# Patient Record
Sex: FEMALE | Race: WHITE | NOT HISPANIC OR LATINO | Employment: OTHER | URBAN - METROPOLITAN AREA
[De-identification: names, ages, dates, MRNs, and addresses within clinical notes are randomized per-mention and may not be internally consistent; named-entity substitution may affect disease eponyms.]

---

## 2023-02-05 PROBLEM — I49.9 IRREGULAR HEART BEAT: Status: ACTIVE | Noted: 2023-02-05

## 2023-02-05 PROBLEM — N18.31 STAGE 3A CHRONIC KIDNEY DISEASE (MULTI): Status: ACTIVE | Noted: 2023-02-05

## 2023-02-05 PROBLEM — F32.2 DEPRESSION, MAJOR, SINGLE EPISODE, SEVERE (MULTI): Status: ACTIVE | Noted: 2023-02-05

## 2023-02-05 PROBLEM — F43.21 GRIEF: Status: ACTIVE | Noted: 2023-02-05

## 2023-02-05 PROBLEM — R00.2 PALPITATIONS: Status: ACTIVE | Noted: 2023-02-05

## 2023-02-05 PROBLEM — M19.90 ARTHRITIS: Status: ACTIVE | Noted: 2023-02-05

## 2023-02-05 PROBLEM — R94.31 ABNORMAL EKG: Status: ACTIVE | Noted: 2023-02-05

## 2023-02-05 PROBLEM — I11.9 HYPERTENSIVE HEART DISEASE: Status: ACTIVE | Noted: 2023-02-05

## 2023-02-05 PROBLEM — M79.89 LEG SWELLING: Status: ACTIVE | Noted: 2023-02-05

## 2023-02-05 PROBLEM — R49.0 HOARSENESS: Status: ACTIVE | Noted: 2023-02-05

## 2023-02-05 PROBLEM — E55.9 VITAMIN D DEFICIENCY: Status: ACTIVE | Noted: 2023-02-05

## 2023-02-05 PROBLEM — I47.29 NON-SUSTAINED VENTRICULAR TACHYCARDIA (MULTI): Status: ACTIVE | Noted: 2023-02-05

## 2023-02-05 PROBLEM — D37.8 INTRADUCTAL PAPILLARY MUCINOUS TUMOR OF UNCERTAIN BEHAVIOR OF PANCREAS: Status: ACTIVE | Noted: 2023-02-05

## 2023-02-05 PROBLEM — E78.5 HYPERLIPIDEMIA: Status: ACTIVE | Noted: 2023-02-05

## 2023-02-05 PROBLEM — E03.9 HYPOTHYROIDISM: Status: ACTIVE | Noted: 2023-02-05

## 2023-02-05 PROBLEM — E11.9 DIABETES MELLITUS (MULTI): Status: ACTIVE | Noted: 2023-02-05

## 2023-02-05 PROBLEM — R74.8 ELEVATED LIVER ENZYMES: Status: ACTIVE | Noted: 2023-02-05

## 2023-02-05 PROBLEM — E04.1 THYROID NODULE: Status: ACTIVE | Noted: 2023-02-05

## 2023-02-05 PROBLEM — D64.9 ANEMIA: Status: ACTIVE | Noted: 2023-02-05

## 2023-02-05 PROBLEM — R63.6 MILDLY UNDERWEIGHT ADULT: Status: ACTIVE | Noted: 2023-02-05

## 2023-02-05 PROBLEM — E53.8 VITAMIN B12 DEFICIENCY: Status: ACTIVE | Noted: 2023-02-05

## 2023-02-05 PROBLEM — C82.90 FOLLICULAR LYMPHOMA (MULTI): Status: ACTIVE | Noted: 2023-02-05

## 2023-02-05 PROBLEM — I47.10 SUPRAVENTRICULAR TACHYCARDIA (CMS-HCC): Status: ACTIVE | Noted: 2023-02-05

## 2023-02-05 PROBLEM — I10 HYPERTENSION: Status: ACTIVE | Noted: 2023-02-05

## 2023-02-05 PROBLEM — R79.89 ELEVATED LFTS: Status: ACTIVE | Noted: 2023-02-05

## 2023-02-05 PROBLEM — D49.0 IPMN (INTRADUCTAL PAPILLARY MUCINOUS NEOPLASM): Status: ACTIVE | Noted: 2023-02-05

## 2023-02-05 RX ORDER — TALC
1 POWDER (GRAM) TOPICAL 2 TIMES DAILY
COMMUNITY
Start: 2021-03-22

## 2023-02-05 RX ORDER — SULFASALAZINE 500 MG/1
2 TABLET ORAL 2 TIMES DAILY
COMMUNITY
Start: 2022-09-15

## 2023-02-05 RX ORDER — HYDROXYCHLOROQUINE SULFATE 200 MG/1
1 TABLET, FILM COATED ORAL DAILY
COMMUNITY
End: 2023-03-27

## 2023-02-05 RX ORDER — ZINC GLUCONATE 50 MG
1 TABLET ORAL DAILY
COMMUNITY
Start: 2021-05-04

## 2023-02-05 RX ORDER — CYANOCOBALAMIN 1000 UG/ML
1000 INJECTION, SOLUTION INTRAMUSCULAR; SUBCUTANEOUS
COMMUNITY
End: 2023-11-21 | Stop reason: SDUPTHER

## 2023-02-05 RX ORDER — BLOOD SUGAR DIAGNOSTIC
STRIP MISCELLANEOUS
COMMUNITY
Start: 2019-10-17

## 2023-02-05 RX ORDER — ZOLEDRONIC ACID 5 MG/100ML
INJECTION, SOLUTION INTRAVENOUS
COMMUNITY
End: 2023-11-16 | Stop reason: ALTCHOICE

## 2023-02-05 RX ORDER — LIDOCAINE 50 MG/G
PATCH TOPICAL
COMMUNITY
Start: 2019-08-23 | End: 2023-11-21 | Stop reason: SDUPTHER

## 2023-02-05 RX ORDER — METOPROLOL SUCCINATE 25 MG/1
1 TABLET, EXTENDED RELEASE ORAL DAILY
COMMUNITY
Start: 2021-01-25 | End: 2024-01-02

## 2023-02-05 RX ORDER — LEVOTHYROXINE SODIUM 88 UG/1
1 TABLET ORAL DAILY
COMMUNITY
Start: 2019-08-23 | End: 2023-12-05 | Stop reason: SDUPTHER

## 2023-03-27 ENCOUNTER — OFFICE VISIT (OUTPATIENT)
Dept: PRIMARY CARE | Facility: CLINIC | Age: 78
End: 2023-03-27
Payer: MEDICARE

## 2023-03-27 VITALS
RESPIRATION RATE: 14 BRPM | HEIGHT: 70 IN | SYSTOLIC BLOOD PRESSURE: 100 MMHG | BODY MASS INDEX: 18.04 KG/M2 | HEART RATE: 60 BPM | OXYGEN SATURATION: 97 % | WEIGHT: 126 LBS | DIASTOLIC BLOOD PRESSURE: 60 MMHG

## 2023-03-27 DIAGNOSIS — F32.2 DEPRESSION, MAJOR, SINGLE EPISODE, SEVERE (MULTI): ICD-10-CM

## 2023-03-27 DIAGNOSIS — E11.9 TYPE 2 DIABETES MELLITUS WITHOUT COMPLICATION, WITHOUT LONG-TERM CURRENT USE OF INSULIN (MULTI): Primary | ICD-10-CM

## 2023-03-27 DIAGNOSIS — C82.90 FOLLICULAR LYMPHOMA, UNSPECIFIED FOLLICULAR LYMPHOMA TYPE, UNSPECIFIED BODY REGION (MULTI): ICD-10-CM

## 2023-03-27 DIAGNOSIS — E46 PROTEIN-CALORIE MALNUTRITION, UNSPECIFIED SEVERITY (MULTI): ICD-10-CM

## 2023-03-27 DIAGNOSIS — M81.0 POSTMENOPAUSAL OSTEOPOROSIS: ICD-10-CM

## 2023-03-27 PROBLEM — N18.31 STAGE 3A CHRONIC KIDNEY DISEASE (MULTI): Status: RESOLVED | Noted: 2023-02-05 | Resolved: 2023-03-27

## 2023-03-27 PROBLEM — R00.2 PALPITATIONS: Status: RESOLVED | Noted: 2023-02-05 | Resolved: 2023-03-27

## 2023-03-27 PROBLEM — R49.0 HOARSENESS: Status: RESOLVED | Noted: 2023-02-05 | Resolved: 2023-03-27

## 2023-03-27 PROBLEM — M79.89 LEG SWELLING: Status: RESOLVED | Noted: 2023-02-05 | Resolved: 2023-03-27

## 2023-03-27 PROBLEM — I49.9 IRREGULAR HEART BEAT: Status: RESOLVED | Noted: 2023-02-05 | Resolved: 2023-03-27

## 2023-03-27 PROBLEM — R94.31 ABNORMAL EKG: Status: RESOLVED | Noted: 2023-02-05 | Resolved: 2023-03-27

## 2023-03-27 PROBLEM — D49.0 IPMN (INTRADUCTAL PAPILLARY MUCINOUS NEOPLASM): Status: RESOLVED | Noted: 2023-02-05 | Resolved: 2023-03-27

## 2023-03-27 PROBLEM — I47.29 NON-SUSTAINED VENTRICULAR TACHYCARDIA (MULTI): Status: RESOLVED | Noted: 2023-02-05 | Resolved: 2023-03-27

## 2023-03-27 PROBLEM — I47.10 SUPRAVENTRICULAR TACHYCARDIA (CMS-HCC): Status: RESOLVED | Noted: 2023-02-05 | Resolved: 2023-03-27

## 2023-03-27 PROBLEM — E04.1 THYROID NODULE: Status: RESOLVED | Noted: 2023-02-05 | Resolved: 2023-03-27

## 2023-03-27 PROBLEM — D37.8 INTRADUCTAL PAPILLARY MUCINOUS TUMOR OF UNCERTAIN BEHAVIOR OF PANCREAS: Status: RESOLVED | Noted: 2023-02-05 | Resolved: 2023-03-27

## 2023-03-27 LAB — POC HEMOGLOBIN A1C: 5.1 % (ref 4.2–6.5)

## 2023-03-27 PROCEDURE — 3074F SYST BP LT 130 MM HG: CPT | Performed by: INTERNAL MEDICINE

## 2023-03-27 PROCEDURE — 99214 OFFICE O/P EST MOD 30 MIN: CPT | Performed by: INTERNAL MEDICINE

## 2023-03-27 PROCEDURE — 1036F TOBACCO NON-USER: CPT | Performed by: INTERNAL MEDICINE

## 2023-03-27 PROCEDURE — 3078F DIAST BP <80 MM HG: CPT | Performed by: INTERNAL MEDICINE

## 2023-03-27 PROCEDURE — 83036 HEMOGLOBIN GLYCOSYLATED A1C: CPT | Performed by: INTERNAL MEDICINE

## 2023-03-27 PROCEDURE — 1159F MED LIST DOCD IN RCRD: CPT | Performed by: INTERNAL MEDICINE

## 2023-03-27 PROCEDURE — 1160F RVW MEDS BY RX/DR IN RCRD: CPT | Performed by: INTERNAL MEDICINE

## 2023-03-27 NOTE — PROGRESS NOTES
"Subjective    Adilene Polo is a 77 y.o. female who presents for Diabetes.  HPI    6 month follow up , DM   She is feeling well  Nothing new with her lymphoma  Off of her lipitor for her elevated lft.   She is feeling well. Gained some weight  It is getting harder for her to drive all the way over here may get primary on the est side  Doing well otherwise    Review of Systems   All other systems reviewed and are negative.        Objective     /60 (BP Location: Left arm, Patient Position: Sitting)   Pulse 60   Resp 14   Ht 1.772 m (5' 9.75\")   Wt 57.2 kg (126 lb)   SpO2 97%   BMI 18.21 kg/m²    Physical Exam  Vitals reviewed.   Constitutional:       General: She is not in acute distress.     Appearance: Normal appearance.   Cardiovascular:      Rate and Rhythm: Normal rate and regular rhythm.      Pulses: Normal pulses.      Heart sounds: Normal heart sounds.   Pulmonary:      Effort: Pulmonary effort is normal.      Breath sounds: Normal breath sounds.   Abdominal:      General: Abdomen is flat. Bowel sounds are normal.      Palpations: Abdomen is soft.      Tenderness: There is no abdominal tenderness.   Musculoskeletal:         General: No swelling.   Skin:     General: Skin is warm and dry.   Neurological:      Mental Status: She is alert.       Health Maintenance Due   Topic Date Due    Medicare Annual Wellness Visit (AWV)  Never done    Hepatitis A Vaccines (1 of 2 - Risk 2-dose series) Never done    Diabetes: Foot Exam  Never done    Diabetes: Retinopathy Screening  Never done    DTaP/Tdap/Td Vaccines (1 - Tdap) Never done    Hepatitis B Vaccines (1 of 3 - Risk 3-dose series) Never done    Diabetes: Urine Protein Screening  04/30/2020    TSH Level  01/14/2022          Assessment/Plan   Problem List Items Addressed This Visit          Endocrine/Metabolic    Diabetes mellitus (CMS/HCC) - Primary    Relevant Orders    POCT glycosylated hemoglobin (Hb A1C) manually resulted (Completed)    Follow Up In " [facility] - PCP    Protein-calorie malnutrition, unspecified severity (CMS/HCC)       Other    Depression, major, single episode, severe (CMS/HCC)    Follicular lymphoma (CMS/HCC)     Other Visit Diagnoses       Postmenopausal osteoporosis        stable followed by her rheumatologist at [facility]. done with Reclast        Stable based on symptoms. Continue established treatment plan and follow up at least yearly  Her hga1c is good.

## 2023-03-27 NOTE — PATIENT INSTRUCTIONS
She may get an internist closer to home.  Call  with any problems or questions.   Follow up in 6 months

## 2023-06-23 ENCOUNTER — APPOINTMENT (OUTPATIENT)
Dept: PRIMARY CARE | Facility: CLINIC | Age: 78
End: 2023-06-23
Payer: MEDICARE

## 2023-07-13 ENCOUNTER — OFFICE VISIT (OUTPATIENT)
Dept: PRIMARY CARE | Facility: CLINIC | Age: 78
End: 2023-07-13
Payer: MEDICARE

## 2023-07-13 VITALS
HEIGHT: 70 IN | WEIGHT: 132.2 LBS | SYSTOLIC BLOOD PRESSURE: 124 MMHG | BODY MASS INDEX: 18.92 KG/M2 | HEART RATE: 69 BPM | DIASTOLIC BLOOD PRESSURE: 67 MMHG

## 2023-07-13 DIAGNOSIS — D64.9 ANEMIA, UNSPECIFIED TYPE: ICD-10-CM

## 2023-07-13 DIAGNOSIS — M06.9 RHEUMATOID ARTHRITIS, INVOLVING UNSPECIFIED SITE, UNSPECIFIED WHETHER RHEUMATOID FACTOR PRESENT (MULTI): ICD-10-CM

## 2023-07-13 DIAGNOSIS — E46 PROTEIN-CALORIE MALNUTRITION, UNSPECIFIED SEVERITY (MULTI): ICD-10-CM

## 2023-07-13 DIAGNOSIS — J44.9 CHRONIC OBSTRUCTIVE PULMONARY DISEASE, UNSPECIFIED COPD TYPE (MULTI): ICD-10-CM

## 2023-07-13 DIAGNOSIS — M06.09 SERONEGATIVE RHEUMATOID ARTHRITIS OF MULTIPLE SITES (MULTI): ICD-10-CM

## 2023-07-13 DIAGNOSIS — E11.21 DIABETIC NEPHROPATHY ASSOCIATED WITH TYPE 2 DIABETES MELLITUS (MULTI): ICD-10-CM

## 2023-07-13 DIAGNOSIS — E11.9 TYPE 2 DIABETES MELLITUS WITHOUT COMPLICATION, WITHOUT LONG-TERM CURRENT USE OF INSULIN (MULTI): ICD-10-CM

## 2023-07-13 DIAGNOSIS — I47.10 PAROXYSMAL SUPRAVENTRICULAR TACHYCARDIA (CMS-HCC): ICD-10-CM

## 2023-07-13 DIAGNOSIS — F32.2 DEPRESSION, MAJOR, SINGLE EPISODE, SEVERE (MULTI): Primary | ICD-10-CM

## 2023-07-13 DIAGNOSIS — C82.90 FOLLICULAR LYMPHOMA, UNSPECIFIED FOLLICULAR LYMPHOMA TYPE, UNSPECIFIED BODY REGION (MULTI): ICD-10-CM

## 2023-07-13 DIAGNOSIS — M81.0 AGE RELATED OSTEOPOROSIS, UNSPECIFIED PATHOLOGICAL FRACTURE PRESENCE: ICD-10-CM

## 2023-07-13 PROBLEM — M70.62 TROCHANTERIC BURSITIS OF LEFT HIP: Status: ACTIVE | Noted: 2019-10-10

## 2023-07-13 PROBLEM — Z98.890 HISTORY OF CARPAL TUNNEL SURGERY OF LEFT WRIST: Status: RESOLVED | Noted: 2019-10-10 | Resolved: 2023-07-13

## 2023-07-13 PROBLEM — Z85.820 HISTORY OF MALIGNANT MELANOMA: Status: RESOLVED | Noted: 2018-08-06 | Resolved: 2023-07-13

## 2023-07-13 PROBLEM — M15.9 OSTEOARTHRITIS OF MULTIPLE JOINTS: Status: ACTIVE | Noted: 2021-02-10

## 2023-07-13 PROBLEM — M70.61 TROCHANTERIC BURSITIS OF RIGHT HIP: Status: ACTIVE | Noted: 2019-10-10

## 2023-07-13 PROBLEM — M47.16 LUMBAR SPONDYLOSIS WITH MYELOPATHY: Status: ACTIVE | Noted: 2019-10-10

## 2023-07-13 PROBLEM — J45.909 ASTHMA WITHOUT STATUS ASTHMATICUS (HHS-HCC): Status: ACTIVE | Noted: 2019-10-10

## 2023-07-13 PROBLEM — M75.51 BURSITIS OF RIGHT SHOULDER: Status: ACTIVE | Noted: 2019-10-10

## 2023-07-13 PROCEDURE — 1036F TOBACCO NON-USER: CPT | Performed by: INTERNAL MEDICINE

## 2023-07-13 PROCEDURE — 3074F SYST BP LT 130 MM HG: CPT | Performed by: INTERNAL MEDICINE

## 2023-07-13 PROCEDURE — 3078F DIAST BP <80 MM HG: CPT | Performed by: INTERNAL MEDICINE

## 2023-07-13 PROCEDURE — 1160F RVW MEDS BY RX/DR IN RCRD: CPT | Performed by: INTERNAL MEDICINE

## 2023-07-13 PROCEDURE — 1159F MED LIST DOCD IN RCRD: CPT | Performed by: INTERNAL MEDICINE

## 2023-07-13 PROCEDURE — 99214 OFFICE O/P EST MOD 30 MIN: CPT | Performed by: INTERNAL MEDICINE

## 2023-07-13 PROCEDURE — 1125F AMNT PAIN NOTED PAIN PRSNT: CPT | Performed by: INTERNAL MEDICINE

## 2023-07-13 ASSESSMENT — ENCOUNTER SYMPTOMS
DYSURIA: 0
FATIGUE: 1
HEMATURIA: 0
APPETITE CHANGE: 0
VOMITING: 0
DECREASED CONCENTRATION: 0
HEADACHES: 0
SORE THROAT: 0
DIARRHEA: 0
FREQUENCY: 0
SHORTNESS OF BREATH: 0
SINUS PRESSURE: 0
SLEEP DISTURBANCE: 0
ARTHRALGIAS: 1
PALPITATIONS: 0
NERVOUS/ANXIOUS: 0
CONSTIPATION: 0
NAUSEA: 0
NECK PAIN: 0
UNEXPECTED WEIGHT CHANGE: 1
TROUBLE SWALLOWING: 0
WHEEZING: 0
LIGHT-HEADEDNESS: 0
CHEST TIGHTNESS: 0
RHINORRHEA: 0
FLANK PAIN: 0
COUGH: 0
COLOR CHANGE: 0
JOINT SWELLING: 0
ACTIVITY CHANGE: 0
SINUS PAIN: 0
EYE ITCHING: 0
NUMBNESS: 0
DIZZINESS: 0
FEVER: 0
DYSPHORIC MOOD: 0
ABDOMINAL PAIN: 0

## 2023-07-13 NOTE — PROGRESS NOTES
Adilene Polo comes in today to establish with a new PCP.      Ms. Polo comes in today to establish with a new primary care physician.  She is followed by several specialists including oncology at [facility] for a follicular lymphoma, cardiology for paroxysmal SVT, as well as rheumatology for rheumatoid arthritis.  She has had a Whipple procedure for a pancreatic lesion.  She has had significant weight loss since that time but has remained stable recently.  She continues on thyroid replacement therapy.  She believes her lab work has been recently updated.  She plans on returning later this year for a wellness visit and updated lab studies.  She feels reasonably well, somewhat fatigued but she feels that this is her new normal.  She denies any specific headaches, dizziness, chest pain, changes in her breathing, nausea, vomiting, nor changes in bowel nor bladder habits.        Review of Systems   Constitutional:  Positive for fatigue and unexpected weight change. Negative for activity change, appetite change and fever.   HENT:  Negative for congestion, ear pain, hearing loss, rhinorrhea, sinus pressure, sinus pain, sore throat and trouble swallowing.    Eyes:  Negative for itching and visual disturbance.   Respiratory:  Negative for cough, chest tightness, shortness of breath and wheezing.    Cardiovascular:  Negative for chest pain, palpitations and leg swelling.   Gastrointestinal:  Negative for abdominal pain, constipation, diarrhea, nausea and vomiting.   Endocrine: Negative for cold intolerance and polyuria.   Genitourinary:  Negative for dysuria, flank pain, frequency, hematuria, urgency and vaginal discharge.   Musculoskeletal:  Positive for arthralgias. Negative for joint swelling and neck pain.   Skin:  Negative for color change, pallor and rash.   Allergic/Immunologic: Negative for environmental allergies, food allergies and immunocompromised state.   Neurological:  Negative for dizziness, syncope,  light-headedness, numbness and headaches.   Psychiatric/Behavioral:  Negative for behavioral problems, decreased concentration, dysphoric mood and sleep disturbance. The patient is not nervous/anxious.        Objective   Physical Exam  Constitutional:       General: She is not in acute distress.     Appearance: Normal appearance. She is underweight. She is not ill-appearing.   HENT:      Head: Normocephalic.      Right Ear: Tympanic membrane, ear canal and external ear normal.      Left Ear: Tympanic membrane, ear canal and external ear normal.      Nose: Nose normal.      Mouth/Throat:      Mouth: Mucous membranes are moist.      Pharynx: Oropharynx is clear. No oropharyngeal exudate or posterior oropharyngeal erythema.   Eyes:      General: Lids are normal. No scleral icterus.     Extraocular Movements: Extraocular movements intact.      Conjunctiva/sclera: Conjunctivae normal.      Pupils: Pupils are equal, round, and reactive to light.   Neck:      Vascular: No carotid bruit.   Cardiovascular:      Rate and Rhythm: Normal rate and regular rhythm.      Pulses: Normal pulses.      Heart sounds: No murmur heard.  Pulmonary:      Effort: Pulmonary effort is normal. No respiratory distress.      Breath sounds: No wheezing, rhonchi or rales.   Abdominal:      General: Bowel sounds are normal. There is no distension.      Palpations: Abdomen is soft. There is no mass.      Tenderness: There is no abdominal tenderness. There is no guarding.   Musculoskeletal:      Cervical back: Normal range of motion and neck supple. No tenderness.      Right lower leg: No edema.      Left lower leg: No edema.   Lymphadenopathy:      Cervical: No cervical adenopathy.   Skin:     General: Skin is warm and dry.      Coloration: Skin is not pale.      Findings: No bruising or rash.   Neurological:      General: No focal deficit present.      Mental Status: She is alert and oriented to person, place, and time.      Cranial Nerves: No  cranial nerve deficit.      Motor: No weakness.      Gait: Gait normal.   Psychiatric:         Mood and Affect: Mood normal.         Behavior: Behavior normal.         Judgment: Judgment normal.         Assessment/Plan   1.  Hypothyroidism: Do not see TSH in our recent labs, but may be followed by outside specialist, states that this has been updated recently.  We will consider updating with wellness visit at the end of the year.  2.  Rheumatoid arthritis: Follows closely with rheumatologist.  3.  Osteoporosis: Again, follow-up with rheumatology, seemingly receiving Reclast injections, but not certain whether this is continuing or if on drug holiday.  4.  Paroxysmal SVT: Managed with low-dose Toprol.  5.  Borderline diabetes with questionable nephropathy: Again, lab work recently stable, plan on rechecking at the end of the year with her wellness visit.  6.  Anemia: Follows closely with hematologist.  7.  History of depression: Not requiring medication therapy currently and managing well.  8.  Malnutrition: Trying to manage as well as can with higher protein diet.  Continues to work with oncologist as well.  9.  COPD: Asymptomatic not on any maintenance therapy.  We will try to review old records to assess severity.    Happy to see her back towards the end of the year for a wellness visit.  She is to call with any questions prior to that time.  Problem List Items Addressed This Visit    None

## 2023-07-13 NOTE — PATIENT INSTRUCTIONS
It was a pleasure meeting you in the office today.  We will plan on seeing you back towards the end of the year for your wellness visit.  If you have any questions or need additional refills prior to that time, please feel free to contact us.  Please keep close follow-up with your specialist and call with any additional questions or concerns.

## 2023-09-04 LAB
FAT, FECAL - NEUTRAL: NORMAL
FAT, FECAL - SPLIT: NORMAL
PANCREATIC ELASTASE, FECAL: 14 UG/G

## 2023-09-14 LAB — NATRIURETIC PEPTIDE B (PG/ML) IN SER/PLAS: 139 PG/ML (ref 0–99)

## 2023-10-02 ENCOUNTER — LAB (OUTPATIENT)
Dept: LAB | Facility: LAB | Age: 78
End: 2023-10-02
Payer: MEDICARE

## 2023-10-02 DIAGNOSIS — E85.4 CARDIAC AMYLOIDOSIS (MULTI): Primary | ICD-10-CM

## 2023-10-02 DIAGNOSIS — E85.81 LIGHT CHAIN (AL) AMYLOIDOSIS (MULTI): ICD-10-CM

## 2023-10-02 DIAGNOSIS — I43 CARDIAC AMYLOIDOSIS (MULTI): Primary | ICD-10-CM

## 2023-10-02 DIAGNOSIS — R79.89 ELEVATED LFTS: Primary | ICD-10-CM

## 2023-10-02 DIAGNOSIS — E85.81 LIGHT CHAIN (AL) AMYLOIDOSIS (MULTI): Primary | ICD-10-CM

## 2023-10-02 LAB — PROT SERPL-MCNC: 6.4 G/DL (ref 6.4–8.2)

## 2023-10-02 PROCEDURE — 36415 COLL VENOUS BLD VENIPUNCTURE: CPT

## 2023-10-02 RX ORDER — PANCRELIPASE 36000; 180000; 114000 [USP'U]/1; [USP'U]/1; [USP'U]/1
4 CAPSULE, DELAYED RELEASE PELLETS ORAL DAILY
Qty: 360 CAPSULE | Refills: 0 | Status: SHIPPED | OUTPATIENT
Start: 2023-10-02

## 2023-10-02 RX ORDER — PANCRELIPASE 36000; 180000; 114000 [USP'U]/1; [USP'U]/1; [USP'U]/1
4 CAPSULE, DELAYED RELEASE PELLETS ORAL DAILY
COMMUNITY
Start: 2023-09-10 | End: 2023-10-02 | Stop reason: SDUPTHER

## 2023-10-02 NOTE — PROGRESS NOTES
Inspection:  JVD: none  Precordial bulge: none  Palpation:   Quality: excellent  Precordium: normal impulses  Auscultation:  Quality of auscultation: excellent  S1: normal intensity and splitting  S2: normal intensity and splitting  Clicks: none  Gallops: none  Rub: none  Systolic murmurs: none  Diastolic murmurs: none

## 2023-10-04 LAB
ALBUMIN: 4 G/DL (ref 3.4–5)
ALPHA 1 GLOBULIN: 0.3 G/DL (ref 0.2–0.6)
ALPHA 2 GLOBULIN: 0.6 G/DL (ref 0.4–1.1)
BETA GLOBULIN: 0.7 G/DL (ref 0.5–1.2)
GAMMA GLOBULIN: 0.8 G/DL (ref 0.5–1.4)
PATH REVIEW-SERUM PROTEIN ELECTROPHORESIS: NORMAL
PROTEIN ELECTROPHORESIS COMMENT: NORMAL

## 2023-10-05 ENCOUNTER — APPOINTMENT (OUTPATIENT)
Dept: CARDIOLOGY | Facility: CLINIC | Age: 78
End: 2023-10-05
Payer: MEDICARE

## 2023-10-10 PROCEDURE — 36415 COLL VENOUS BLD VENIPUNCTURE: CPT

## 2023-10-12 ENCOUNTER — HOSPITAL ENCOUNTER (OUTPATIENT)
Dept: RADIOLOGY | Facility: HOSPITAL | Age: 78
Discharge: HOME | End: 2023-10-12
Payer: MEDICARE

## 2023-10-12 ENCOUNTER — TELEPHONE (OUTPATIENT)
Dept: CARDIOLOGY | Facility: HOSPITAL | Age: 78
End: 2023-10-12

## 2023-10-12 ENCOUNTER — LAB (OUTPATIENT)
Dept: LAB | Facility: LAB | Age: 78
End: 2023-10-12
Payer: MEDICARE

## 2023-10-12 DIAGNOSIS — I43 CARDIAC AMYLOIDOSIS (MULTI): Primary | ICD-10-CM

## 2023-10-12 DIAGNOSIS — R94.31 ABNORMAL ELECTROCARDIOGRAM (ECG) (EKG): ICD-10-CM

## 2023-10-12 DIAGNOSIS — I50.30 UNSPECIFIED DIASTOLIC (CONGESTIVE) HEART FAILURE (MULTI): ICD-10-CM

## 2023-10-12 DIAGNOSIS — E85.4 CARDIAC AMYLOIDOSIS (MULTI): Primary | ICD-10-CM

## 2023-10-12 DIAGNOSIS — E85.4 CARDIAC AMYLOIDOSIS (MULTI): ICD-10-CM

## 2023-10-12 DIAGNOSIS — E85.4 ORGAN-LIMITED AMYLOIDOSIS (MULTI): ICD-10-CM

## 2023-10-12 DIAGNOSIS — I43 CARDIAC AMYLOIDOSIS (MULTI): ICD-10-CM

## 2023-10-12 DIAGNOSIS — E85.81 LIGHT CHAIN (AL) AMYLOIDOSIS (MULTI): ICD-10-CM

## 2023-10-12 LAB
COLLECT DURATION TIME SPEC: 24 HRS
PROT 24H UR-MCNC: 8 MG/DL (ref 5–24)
SPECIMEN VOL 24H UR: 2250 ML
TOTAL PROTEIN (MG/24HR) IN 24 HOUR URINE UPE: 180 MG/24H

## 2023-10-12 PROCEDURE — 78830 RP LOCLZJ TUM SPECT W/CT 1: CPT | Performed by: RADIOLOGY

## 2023-10-12 PROCEDURE — A9503 TC99M MEDRONATE: HCPCS

## 2023-10-12 PROCEDURE — 81050 URINALYSIS VOLUME MEASURE: CPT

## 2023-10-12 PROCEDURE — 84166 PROTEIN E-PHORESIS/URINE/CSF: CPT | Performed by: INTERNAL MEDICINE

## 2023-10-12 PROCEDURE — 3430000001 HC RX 343 DIAGNOSTIC RADIOPHARMACEUTICALS

## 2023-10-12 PROCEDURE — 78469 HEART INFARCT IMAGE (3D): CPT

## 2023-10-12 PROCEDURE — 84156 ASSAY OF PROTEIN URINE: CPT

## 2023-10-12 PROCEDURE — 84166 PROTEIN E-PHORESIS/URINE/CSF: CPT

## 2023-10-12 PROCEDURE — 86325 OTHER IMMUNOELECTROPHORESIS: CPT | Performed by: INTERNAL MEDICINE

## 2023-10-12 PROCEDURE — 86335 IMMUNFIX E-PHORSIS/URINE/CSF: CPT

## 2023-10-12 RX ORDER — TECHNETIUM TC99M PYROPHOSPHATE 12 MG/10ML
22 INJECTION INTRAVENOUS
Status: COMPLETED | OUTPATIENT
Start: 2023-10-12 | End: 2023-10-12

## 2023-10-12 RX ADMIN — TECHNETIUM TC99M PYROPHOSPHATE 22 MILLICURIE: 12 INJECTION INTRAVENOUS at 10:05

## 2023-10-13 ENCOUNTER — TELEPHONE (OUTPATIENT)
Dept: CARDIOLOGY | Facility: CLINIC | Age: 78
End: 2023-10-13
Payer: MEDICARE

## 2023-10-17 LAB
ALBUMIN MFR UR ELPH: 51.8 %
ALPHA1 GLOB MFR UR ELPH: 8 %
ALPHA2 GLOB MFR UR ELPH: 6.6 %
B-GLOBULIN MFR UR ELPH: 16.3 %
GAMMA GLOB MFR UR ELPH: 17.3 %
IMMUNOFIXATION COMMENT: NORMAL
PATH REVIEW - URINE IMMUNOFIXATION: NORMAL
PATH REVIEW-URINE PROTEIN ELECTROPHORESIS: NORMAL
URINE ELECTROPHORESIS COMMENT: NORMAL

## 2023-10-18 DIAGNOSIS — I43 CARDIAC AMYLOIDOSIS (MULTI): Primary | ICD-10-CM

## 2023-10-18 DIAGNOSIS — E85.4 CARDIAC AMYLOIDOSIS (MULTI): Primary | ICD-10-CM

## 2023-10-27 ENCOUNTER — CLINICAL SUPPORT (OUTPATIENT)
Dept: GENETICS | Facility: CLINIC | Age: 78
End: 2023-10-27
Payer: MEDICARE

## 2023-10-27 DIAGNOSIS — I43 CARDIAC AMYLOIDOSIS (MULTI): ICD-10-CM

## 2023-10-27 DIAGNOSIS — E85.4 CARDIAC AMYLOIDOSIS (MULTI): ICD-10-CM

## 2023-11-02 ENCOUNTER — OFFICE VISIT (OUTPATIENT)
Dept: CARDIOLOGY | Facility: CLINIC | Age: 78
End: 2023-11-02
Payer: MEDICARE

## 2023-11-02 VITALS
OXYGEN SATURATION: 97 % | BODY MASS INDEX: 18.18 KG/M2 | WEIGHT: 127 LBS | SYSTOLIC BLOOD PRESSURE: 132 MMHG | DIASTOLIC BLOOD PRESSURE: 64 MMHG | HEART RATE: 62 BPM | HEIGHT: 70 IN

## 2023-11-02 DIAGNOSIS — Z91.041 CONTRAST MEDIA ALLERGY: ICD-10-CM

## 2023-11-02 DIAGNOSIS — C82.90 FOLLICULAR LYMPHOMA, UNSPECIFIED FOLLICULAR LYMPHOMA TYPE, UNSPECIFIED BODY REGION (MULTI): Primary | ICD-10-CM

## 2023-11-02 DIAGNOSIS — I87.2 VENOUS INSUFFICIENCY: ICD-10-CM

## 2023-11-02 PROCEDURE — 1160F RVW MEDS BY RX/DR IN RCRD: CPT | Performed by: INTERNAL MEDICINE

## 2023-11-02 PROCEDURE — 3075F SYST BP GE 130 - 139MM HG: CPT | Performed by: INTERNAL MEDICINE

## 2023-11-02 PROCEDURE — 1036F TOBACCO NON-USER: CPT | Performed by: INTERNAL MEDICINE

## 2023-11-02 PROCEDURE — 3078F DIAST BP <80 MM HG: CPT | Performed by: INTERNAL MEDICINE

## 2023-11-02 PROCEDURE — 1125F AMNT PAIN NOTED PAIN PRSNT: CPT | Performed by: INTERNAL MEDICINE

## 2023-11-02 PROCEDURE — 1159F MED LIST DOCD IN RCRD: CPT | Performed by: INTERNAL MEDICINE

## 2023-11-02 PROCEDURE — 99214 OFFICE O/P EST MOD 30 MIN: CPT | Performed by: INTERNAL MEDICINE

## 2023-11-02 RX ORDER — PREDNISONE 50 MG/1
50 TABLET ORAL DAILY
Qty: 4 TABLET | Refills: 0 | Status: SHIPPED | OUTPATIENT
Start: 2023-11-02 | End: 2023-11-06

## 2023-11-02 ASSESSMENT — ENCOUNTER SYMPTOMS
RESPIRATORY NEGATIVE: 1
EYES NEGATIVE: 1
GASTROINTESTINAL NEGATIVE: 1
FATIGUE: 1
NEUROLOGICAL NEGATIVE: 1
MYALGIAS: 1
PSYCHIATRIC NEGATIVE: 1
HEMATOLOGIC/LYMPHATIC NEGATIVE: 1

## 2023-11-02 NOTE — PROGRESS NOTES
"Subjective   Patient ID: Adilene Polo is a 78 y.o. female who presents for New Patient Visit and bilateral LE edema.    HPI   Very pleasant 78 year old female with lymphoma presents here today for bilateral lower extremity edema and evaluation of venous insufficiency. Pt feels fatigued and her level of activity has declined due to LE swelling.   Patient wears compression stockings since a long time but she notes she puts them in the dryer which reduces their efficiency.   Denies any spontaneous ulceration or bleeding. She gets nocturnal cramps especially when she stretches her legs.   Denies any personal hx or Fhx of DVTs in the leg.   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     Venous insufficiency study done on 9/9/23:   Right Lower Venous Insufficiency: Right leg demonstrates no evidence of deep vein thrombosis or deep or superficial venous insufficiency. A cystic structure is noted in the right popliteal fossa.  Left Lower Venous Insufficiency: Reflux is noted in the proximal calf great saphenous and mid calf great saphenous veins.  Left Lower Venous: No evidence of acute deep vein thrombus visualized in the left lower extremity.    Review of Systems   Constitutional:  Positive for fatigue.   HENT: Negative.     Eyes: Negative.    Respiratory: Negative.     Cardiovascular:  Positive for leg swelling.   Gastrointestinal: Negative.    Musculoskeletal:  Positive for myalgias.   Skin: Negative.    Neurological: Negative.    Hematological: Negative.    Psychiatric/Behavioral: Negative.     All other systems reviewed and are negative.      Objective   /64 (BP Location: Right arm, Patient Position: Sitting) Comment: 136/58-left  Pulse 62   Ht 1.765 m (5' 9.5\")   Wt 57.6 kg (127 lb)   SpO2 97%   BMI 18.49 kg/m²     Physical Exam  Vitals reviewed.   Constitutional:       Appearance: Normal appearance.   HENT:      Head: Normocephalic and atraumatic.   Cardiovascular:      Rate and Rhythm: Normal rate and " regular rhythm.      Pulses: Normal pulses.      Heart sounds: Normal heart sounds.   Pulmonary:      Effort: Pulmonary effort is normal.      Breath sounds: Normal breath sounds.   Musculoskeletal:      Cervical back: Normal range of motion.      Right lower leg: Edema present.      Left lower leg: Edema present.   Neurological:      General: No focal deficit present.      Mental Status: She is alert and oriented to person, place, and time.   Psychiatric:         Mood and Affect: Mood normal.         Behavior: Behavior normal.         Assessment/Plan   Very pleasant 78 year old female with bilateral LE edema more pronounced on the left lower extremity and venous insufficiency, left worse than right. She has been wearing compression stockings for a while but is not seeing improvement. She has leg swelling, left worse than right, evidence of reflux bilaterally. Pt has hx of lymphoma( cannot palpate any pelvic lymph nodes) , will obtain Abd/Pelvis image for any lymph nodes which may be the cause of unilateral left leg swelling more than right     1- Patient is advised to adhere to compression stockings, especially when sitting or standing for prolonged hours. Care instructions provided- she is advised not to put them in the dryer.  2- Get a CT scan of abdomen/pelvis to rule out any mechanical obstruction/occlusion/ to r/o presence of enlarged lymph nodes because of her hx of lymphoma. Pt has contrast allergy so we will give her prednisone and Benadryl.   3- Patient will benefit from left GSV ablation or radiofrequency ablation.  In case she does not have any active lymphoma ,will  schedule for the procedure at her convenience.     Scribe Attestation  By signing my name below, Prema STRONG , Scribe   attest that this documentation has been prepared under the direction and in the presence of Rene Romero MD.

## 2023-11-06 ENCOUNTER — LAB (OUTPATIENT)
Dept: LAB | Facility: LAB | Age: 78
End: 2023-11-06
Payer: MEDICARE

## 2023-11-06 DIAGNOSIS — E85.81 LIGHT CHAIN (AL) AMYLOIDOSIS (MULTI): ICD-10-CM

## 2023-11-06 DIAGNOSIS — C82.90 FOLLICULAR LYMPHOMA, UNSPECIFIED FOLLICULAR LYMPHOMA TYPE, UNSPECIFIED BODY REGION (MULTI): ICD-10-CM

## 2023-11-06 DIAGNOSIS — E85.4 CARDIAC AMYLOIDOSIS (MULTI): ICD-10-CM

## 2023-11-06 DIAGNOSIS — I87.2 VENOUS INSUFFICIENCY: ICD-10-CM

## 2023-11-06 DIAGNOSIS — I43 CARDIAC AMYLOIDOSIS (MULTI): ICD-10-CM

## 2023-11-06 LAB
ALBUMIN SERPL BCP-MCNC: 4 G/DL (ref 3.4–5)
ALP SERPL-CCNC: 121 U/L (ref 33–136)
ALT SERPL W P-5'-P-CCNC: 28 U/L (ref 7–45)
ANION GAP SERPL CALC-SCNC: 15 MMOL/L (ref 10–20)
AST SERPL W P-5'-P-CCNC: 36 U/L (ref 9–39)
BILIRUB SERPL-MCNC: 0.7 MG/DL (ref 0–1.2)
BUN SERPL-MCNC: 27 MG/DL (ref 6–23)
CALCIUM SERPL-MCNC: 8.9 MG/DL (ref 8.6–10.6)
CHLORIDE SERPL-SCNC: 108 MMOL/L (ref 98–107)
CO2 SERPL-SCNC: 23 MMOL/L (ref 21–32)
CREAT SERPL-MCNC: 0.82 MG/DL (ref 0.5–1.05)
GFR SERPL CREATININE-BSD FRML MDRD: 73 ML/MIN/1.73M*2
GLUCOSE SERPL-MCNC: 84 MG/DL (ref 74–99)
POTASSIUM SERPL-SCNC: 4.3 MMOL/L (ref 3.5–5.3)
PROT SERPL-MCNC: 6.3 G/DL (ref 6.4–8.2)
PROT UR-ACNC: 17 MG/DL (ref 5–25)
SODIUM SERPL-SCNC: 142 MMOL/L (ref 136–145)

## 2023-11-06 PROCEDURE — 80053 COMPREHEN METABOLIC PANEL: CPT

## 2023-11-06 PROCEDURE — 84166 PROTEIN E-PHORESIS/URINE/CSF: CPT

## 2023-11-06 PROCEDURE — 84156 ASSAY OF PROTEIN URINE: CPT

## 2023-11-06 PROCEDURE — 83521 IG LIGHT CHAINS FREE EACH: CPT

## 2023-11-06 PROCEDURE — 36415 COLL VENOUS BLD VENIPUNCTURE: CPT

## 2023-11-07 ENCOUNTER — APPOINTMENT (OUTPATIENT)
Dept: CARDIOLOGY | Facility: CLINIC | Age: 78
End: 2023-11-07
Payer: MEDICARE

## 2023-11-07 LAB
KAPPA LC SERPL-MCNC: 2.92 MG/DL (ref 0.33–1.94)
KAPPA LC/LAMBDA SER: 1.26 {RATIO} (ref 0.26–1.65)
LAMBDA LC SERPL-MCNC: 2.32 MG/DL (ref 0.57–2.63)

## 2023-11-08 LAB
ALBUMIN MFR UR ELPH: 56 %
ALPHA1 GLOB MFR UR ELPH: 6.3 %
ALPHA2 GLOB MFR UR ELPH: 6.8 %
B-GLOBULIN MFR UR ELPH: 15.7 %
GAMMA GLOB MFR UR ELPH: 15.2 %
PATH REVIEW-URINE PROTEIN ELECTROPHORESIS: NORMAL
URINE ELECTROPHORESIS COMMENT: NORMAL

## 2023-11-10 DIAGNOSIS — I43 CARDIAC AMYLOIDOSIS (MULTI): Primary | ICD-10-CM

## 2023-11-10 DIAGNOSIS — E85.4 CARDIAC AMYLOIDOSIS (MULTI): Primary | ICD-10-CM

## 2023-11-13 ENCOUNTER — ANCILLARY PROCEDURE (OUTPATIENT)
Dept: RADIOLOGY | Facility: CLINIC | Age: 78
End: 2023-11-13
Payer: MEDICARE

## 2023-11-13 ENCOUNTER — SPECIALTY PHARMACY (OUTPATIENT)
Dept: PHARMACY | Facility: CLINIC | Age: 78
End: 2023-11-13

## 2023-11-13 DIAGNOSIS — C82.90 FOLLICULAR LYMPHOMA, UNSPECIFIED FOLLICULAR LYMPHOMA TYPE, UNSPECIFIED BODY REGION (MULTI): ICD-10-CM

## 2023-11-13 DIAGNOSIS — I87.2 VENOUS INSUFFICIENCY: ICD-10-CM

## 2023-11-13 PROCEDURE — 2550000001 HC RX 255 CONTRASTS: Performed by: INTERNAL MEDICINE

## 2023-11-13 PROCEDURE — 72193 CT PELVIS W/DYE: CPT

## 2023-11-13 PROCEDURE — 74177 CT ABD & PELVIS W/CONTRAST: CPT | Performed by: RADIOLOGY

## 2023-11-13 PROCEDURE — 74160 CT ABDOMEN W/CONTRAST: CPT

## 2023-11-13 RX ADMIN — IOHEXOL 75 ML: 350 INJECTION, SOLUTION INTRAVENOUS at 10:04

## 2023-11-15 ASSESSMENT — ENCOUNTER SYMPTOMS
MYALGIAS: 1
FATIGUE: 1
PSYCHIATRIC NEGATIVE: 1
RESPIRATORY NEGATIVE: 1
HEMATOLOGIC/LYMPHATIC NEGATIVE: 1
EYES NEGATIVE: 1
GASTROINTESTINAL NEGATIVE: 1
NEUROLOGICAL NEGATIVE: 1

## 2023-11-15 NOTE — PROGRESS NOTES
"Subjective   Patient ID: Adilene Polo is a 78 y.o. female who presents for FUV    HPI   Very pleasant 78 year old female with hx of lymphoma presents here today with CT results for bilateral lower extremity edema and evaluation of venous insufficiency. Patient wears compression stockings.  Denies any spontaneous ulceration or bleeding. She gets nocturnal cramps especially when she stretches her legs.   Denies any personal hx or Fhx of DVTs in the leg.   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     Venous insufficiency study done on 9/9/23:   Right Lower Venous Insufficiency: Right leg demonstrates no evidence of deep vein thrombosis or deep or superficial venous insufficiency. A cystic structure is noted in the right popliteal fossa.  Left Lower Venous Insufficiency: Reflux is noted in the proximal calf great saphenous and mid calf great saphenous veins.  Left Lower Venous: No evidence of acute deep vein thrombus visualized in the left lower extremity.    Review of Systems   Constitutional:  Positive for fatigue.   HENT: Negative.     Eyes: Negative.    Respiratory: Negative.     Cardiovascular:  Positive for leg swelling.   Gastrointestinal: Negative.    Musculoskeletal:  Positive for myalgias.   Skin: Negative.    Neurological: Negative.    Hematological: Negative.    Psychiatric/Behavioral: Negative.     All other systems reviewed and are negative.      Objective   /62 (BP Location: Left arm, Patient Position: Sitting)   Pulse 73   Ht 1.753 m (5' 9\")   Wt 57.6 kg (127 lb)   SpO2 98%   BMI 18.75 kg/m²     Physical Exam  Vitals reviewed.   Constitutional:       Appearance: Normal appearance.   HENT:      Head: Normocephalic and atraumatic.   Cardiovascular:      Rate and Rhythm: Normal rate and regular rhythm.      Pulses: Normal pulses.      Heart sounds: Normal heart sounds.   Pulmonary:      Effort: Pulmonary effort is normal.      Breath sounds: Normal breath sounds.   Musculoskeletal:      Cervical " back: Normal range of motion.      Right lower leg: Edema present.      Left lower leg: Edema present.   Neurological:      General: No focal deficit present.      Mental Status: She is alert and oriented to person, place, and time.   Psychiatric:         Mood and Affect: Mood normal.         Behavior: Behavior normal.         Assessment/Plan   Very pleasant 78 year old female with bilateral LE edema more pronounced on the left lower extremity and venous insufficiency, left worse than right. She has been wearing compression stockings for a while but is not seeing improvement. Pt has hx of lymphoma( cannot palpate any pelvic lymph nodes) , CT scan recently showed enlarged periaortic retroperitoneal lymph nodes more on the left side which may be the cause of unilateral left leg swelling more than right. Reviewed her CT results.     1- Patient is advised to adhere to compression stockings, especially when sitting or standing for prolonged hours.   2- Will keep appointment with oncology for evaluation of possible active lymphoma.( LN enlarged in comparison to last CT, with possible mechanical obstruction affecting Lt leg mainly)    3- Patient will benefit from left GSV ablation or radiofrequency ablation.  In case she does not have any active lymphoma ,will schedule for the procedure at her convenience.     Scribe Attestation  By signing my name below, IPrema , Scribe   attest that this documentation has been prepared under the direction and in the presence of Rene Romero MD.

## 2023-11-16 ENCOUNTER — LAB (OUTPATIENT)
Dept: LAB | Facility: LAB | Age: 78
End: 2023-11-16
Payer: MEDICARE

## 2023-11-16 ENCOUNTER — OFFICE VISIT (OUTPATIENT)
Dept: CARDIOLOGY | Facility: CLINIC | Age: 78
End: 2023-11-16
Payer: MEDICARE

## 2023-11-16 VITALS
DIASTOLIC BLOOD PRESSURE: 62 MMHG | SYSTOLIC BLOOD PRESSURE: 120 MMHG | HEIGHT: 69 IN | OXYGEN SATURATION: 98 % | WEIGHT: 127 LBS | HEART RATE: 73 BPM | BODY MASS INDEX: 18.81 KG/M2

## 2023-11-16 DIAGNOSIS — G62.89 OTHER POLYNEUROPATHY: ICD-10-CM

## 2023-11-16 DIAGNOSIS — E53.8 VITAMIN B12 DEFICIENCY: ICD-10-CM

## 2023-11-16 DIAGNOSIS — E11.9 TYPE 2 DIABETES MELLITUS WITHOUT COMPLICATION, WITHOUT LONG-TERM CURRENT USE OF INSULIN (MULTI): ICD-10-CM

## 2023-11-16 DIAGNOSIS — C82.96: Primary | ICD-10-CM

## 2023-11-16 LAB
EST. AVERAGE GLUCOSE BLD GHB EST-MCNC: 97 MG/DL
FOLATE SERPL-MCNC: 21 NG/ML
HBA1C MFR BLD: 5 %

## 2023-11-16 PROCEDURE — 99214 OFFICE O/P EST MOD 30 MIN: CPT | Performed by: INTERNAL MEDICINE

## 2023-11-16 PROCEDURE — 36415 COLL VENOUS BLD VENIPUNCTURE: CPT

## 2023-11-16 PROCEDURE — 83921 ORGANIC ACID SINGLE QUANT: CPT

## 2023-11-16 PROCEDURE — 83036 HEMOGLOBIN GLYCOSYLATED A1C: CPT

## 2023-11-16 PROCEDURE — 1036F TOBACCO NON-USER: CPT | Performed by: INTERNAL MEDICINE

## 2023-11-16 PROCEDURE — 1125F AMNT PAIN NOTED PAIN PRSNT: CPT | Performed by: INTERNAL MEDICINE

## 2023-11-16 PROCEDURE — 1160F RVW MEDS BY RX/DR IN RCRD: CPT | Performed by: INTERNAL MEDICINE

## 2023-11-16 PROCEDURE — 3078F DIAST BP <80 MM HG: CPT | Performed by: INTERNAL MEDICINE

## 2023-11-16 PROCEDURE — 82746 ASSAY OF FOLIC ACID SERUM: CPT

## 2023-11-16 PROCEDURE — 3074F SYST BP LT 130 MM HG: CPT | Performed by: INTERNAL MEDICINE

## 2023-11-16 PROCEDURE — 1159F MED LIST DOCD IN RCRD: CPT | Performed by: INTERNAL MEDICINE

## 2023-11-17 ENCOUNTER — APPOINTMENT (OUTPATIENT)
Dept: NEUROLOGY | Facility: CLINIC | Age: 78
End: 2023-11-17
Payer: MEDICARE

## 2023-11-20 LAB — METHYLMALONATE SERPL-SCNC: 0.33 UMOL/L (ref 0–0.4)

## 2023-11-21 ENCOUNTER — LAB (OUTPATIENT)
Dept: LAB | Facility: LAB | Age: 78
End: 2023-11-21
Payer: MEDICARE

## 2023-11-21 ENCOUNTER — OFFICE VISIT (OUTPATIENT)
Dept: PRIMARY CARE | Facility: CLINIC | Age: 78
End: 2023-11-21
Payer: MEDICARE

## 2023-11-21 VITALS
HEART RATE: 77 BPM | HEIGHT: 69 IN | DIASTOLIC BLOOD PRESSURE: 58 MMHG | WEIGHT: 125.8 LBS | BODY MASS INDEX: 18.63 KG/M2 | SYSTOLIC BLOOD PRESSURE: 131 MMHG

## 2023-11-21 DIAGNOSIS — M06.9 RHEUMATOID ARTHRITIS, INVOLVING UNSPECIFIED SITE, UNSPECIFIED WHETHER RHEUMATOID FACTOR PRESENT (MULTI): ICD-10-CM

## 2023-11-21 DIAGNOSIS — E11.9 TYPE 2 DIABETES MELLITUS WITHOUT COMPLICATION, WITHOUT LONG-TERM CURRENT USE OF INSULIN (MULTI): ICD-10-CM

## 2023-11-21 DIAGNOSIS — M06.09 SERONEGATIVE RHEUMATOID ARTHRITIS OF MULTIPLE SITES (MULTI): ICD-10-CM

## 2023-11-21 DIAGNOSIS — I43 CARDIAC AMYLOIDOSIS (MULTI): ICD-10-CM

## 2023-11-21 DIAGNOSIS — E78.5 HYPERLIPIDEMIA, UNSPECIFIED HYPERLIPIDEMIA TYPE: ICD-10-CM

## 2023-11-21 DIAGNOSIS — E03.9 HYPOTHYROIDISM, UNSPECIFIED TYPE: ICD-10-CM

## 2023-11-21 DIAGNOSIS — Z00.00 ROUTINE GENERAL MEDICAL EXAMINATION AT HEALTH CARE FACILITY: Primary | ICD-10-CM

## 2023-11-21 DIAGNOSIS — E11.21 DIABETIC NEPHROPATHY ASSOCIATED WITH TYPE 2 DIABETES MELLITUS (MULTI): ICD-10-CM

## 2023-11-21 DIAGNOSIS — I10 PRIMARY HYPERTENSION: ICD-10-CM

## 2023-11-21 DIAGNOSIS — E53.8 VITAMIN B12 DEFICIENCY: ICD-10-CM

## 2023-11-21 DIAGNOSIS — M81.0 AGE RELATED OSTEOPOROSIS, UNSPECIFIED PATHOLOGICAL FRACTURE PRESENCE: ICD-10-CM

## 2023-11-21 DIAGNOSIS — C82.90 FOLLICULAR LYMPHOMA, UNSPECIFIED FOLLICULAR LYMPHOMA TYPE, UNSPECIFIED BODY REGION (MULTI): ICD-10-CM

## 2023-11-21 DIAGNOSIS — E85.4 CARDIAC AMYLOIDOSIS (MULTI): ICD-10-CM

## 2023-11-21 PROBLEM — K86.2 PANCREATIC CYST (HHS-HCC): Status: ACTIVE | Noted: 2023-11-21

## 2023-11-21 PROBLEM — R74.8 ELEVATED LIVER ENZYMES: Status: RESOLVED | Noted: 2023-02-05 | Resolved: 2023-11-21

## 2023-11-21 PROBLEM — M67.929 BICEPS TENDINOPATHY: Status: RESOLVED | Noted: 2023-11-21 | Resolved: 2023-11-21

## 2023-11-21 PROBLEM — R55 SYNCOPE: Status: RESOLVED | Noted: 2023-11-21 | Resolved: 2023-11-21

## 2023-11-21 LAB
BASOPHILS # BLD AUTO: 0.02 X10*3/UL (ref 0–0.1)
BASOPHILS NFR BLD AUTO: 0.7 %
EOSINOPHIL # BLD AUTO: 0.04 X10*3/UL (ref 0–0.4)
EOSINOPHIL NFR BLD AUTO: 1.4 %
ERYTHROCYTE [DISTWIDTH] IN BLOOD BY AUTOMATED COUNT: 13.1 % (ref 11.5–14.5)
HCT VFR BLD AUTO: 35.7 % (ref 36–46)
HGB BLD-MCNC: 11.3 G/DL (ref 12–16)
IMM GRANULOCYTES # BLD AUTO: 0.01 X10*3/UL (ref 0–0.5)
IMM GRANULOCYTES NFR BLD AUTO: 0.3 % (ref 0–0.9)
LYMPHOCYTES # BLD AUTO: 0.92 X10*3/UL (ref 0.8–3)
LYMPHOCYTES NFR BLD AUTO: 32.2 %
MCH RBC QN AUTO: 32.1 PG (ref 26–34)
MCHC RBC AUTO-ENTMCNC: 31.7 G/DL (ref 32–36)
MCV RBC AUTO: 101 FL (ref 80–100)
MONOCYTES # BLD AUTO: 0.22 X10*3/UL (ref 0.05–0.8)
MONOCYTES NFR BLD AUTO: 7.7 %
NEUTROPHILS # BLD AUTO: 1.65 X10*3/UL (ref 1.6–5.5)
NEUTROPHILS NFR BLD AUTO: 57.7 %
NRBC BLD-RTO: 0 /100 WBCS (ref 0–0)
PLATELET # BLD AUTO: 196 X10*3/UL (ref 150–450)
RBC # BLD AUTO: 3.52 X10*6/UL (ref 4–5.2)
WBC # BLD AUTO: 2.9 X10*3/UL (ref 4.4–11.3)

## 2023-11-21 PROCEDURE — 1036F TOBACCO NON-USER: CPT | Performed by: INTERNAL MEDICINE

## 2023-11-21 PROCEDURE — 83735 ASSAY OF MAGNESIUM: CPT

## 2023-11-21 PROCEDURE — 1160F RVW MEDS BY RX/DR IN RCRD: CPT | Performed by: INTERNAL MEDICINE

## 2023-11-21 PROCEDURE — 1170F FXNL STATUS ASSESSED: CPT | Performed by: INTERNAL MEDICINE

## 2023-11-21 PROCEDURE — 1159F MED LIST DOCD IN RCRD: CPT | Performed by: INTERNAL MEDICINE

## 2023-11-21 PROCEDURE — 84443 ASSAY THYROID STIM HORMONE: CPT

## 2023-11-21 PROCEDURE — G0439 PPPS, SUBSEQ VISIT: HCPCS | Performed by: INTERNAL MEDICINE

## 2023-11-21 PROCEDURE — 85025 COMPLETE CBC W/AUTO DIFF WBC: CPT

## 2023-11-21 PROCEDURE — 99214 OFFICE O/P EST MOD 30 MIN: CPT | Performed by: INTERNAL MEDICINE

## 2023-11-21 PROCEDURE — 3078F DIAST BP <80 MM HG: CPT | Performed by: INTERNAL MEDICINE

## 2023-11-21 PROCEDURE — 3075F SYST BP GE 130 - 139MM HG: CPT | Performed by: INTERNAL MEDICINE

## 2023-11-21 PROCEDURE — 1125F AMNT PAIN NOTED PAIN PRSNT: CPT | Performed by: INTERNAL MEDICINE

## 2023-11-21 PROCEDURE — 82607 VITAMIN B-12: CPT

## 2023-11-21 PROCEDURE — 84439 ASSAY OF FREE THYROXINE: CPT

## 2023-11-21 PROCEDURE — 36415 COLL VENOUS BLD VENIPUNCTURE: CPT

## 2023-11-21 RX ORDER — CYANOCOBALAMIN 1000 UG/ML
1000 INJECTION, SOLUTION INTRAMUSCULAR; SUBCUTANEOUS
Qty: 1 ML | Refills: 11 | Status: SHIPPED | OUTPATIENT
Start: 2023-11-21

## 2023-11-21 RX ORDER — LIDOCAINE 50 MG/G
1 PATCH TOPICAL DAILY
Qty: 30 PATCH | Refills: 2 | Status: SHIPPED | OUTPATIENT
Start: 2023-11-21 | End: 2024-02-19

## 2023-11-21 ASSESSMENT — ACTIVITIES OF DAILY LIVING (ADL)
DOING_HOUSEWORK: INDEPENDENT
DRESSING: INDEPENDENT
MANAGING_FINANCES: INDEPENDENT
BATHING: INDEPENDENT
GROCERY_SHOPPING: INDEPENDENT
TAKING_MEDICATION: INDEPENDENT

## 2023-11-21 ASSESSMENT — ENCOUNTER SYMPTOMS
EYE DISCHARGE: 0
VOICE CHANGE: 0
ABDOMINAL PAIN: 0
DIZZINESS: 0
NECK PAIN: 0
ACTIVITY CHANGE: 1
MYALGIAS: 1
PALPITATIONS: 0
SINUS PRESSURE: 0
COUGH: 0
HYPERACTIVE: 0
CHEST TIGHTNESS: 0
DIARRHEA: 0
LIGHT-HEADEDNESS: 0
EYE ITCHING: 0
FEVER: 0
SINUS PAIN: 0
ARTHRALGIAS: 1
FREQUENCY: 0
WHEEZING: 0
COLOR CHANGE: 0
CONSTIPATION: 0
OCCASIONAL FEELINGS OF UNSTEADINESS: 0
SORE THROAT: 0
NERVOUS/ANXIOUS: 0
DECREASED CONCENTRATION: 0
LOSS OF SENSATION IN FEET: 0
NECK STIFFNESS: 0
APPETITE CHANGE: 1
RHINORRHEA: 0
DYSURIA: 0
VOMITING: 0
HEADACHES: 0
DYSPHORIC MOOD: 0
ABDOMINAL DISTENTION: 0
WEAKNESS: 0
NAUSEA: 0
SLEEP DISTURBANCE: 0
FATIGUE: 1
DEPRESSION: 0
SHORTNESS OF BREATH: 0

## 2023-11-21 ASSESSMENT — PATIENT HEALTH QUESTIONNAIRE - PHQ9
1. LITTLE INTEREST OR PLEASURE IN DOING THINGS: NOT AT ALL
2. FEELING DOWN, DEPRESSED OR HOPELESS: NOT AT ALL
SUM OF ALL RESPONSES TO PHQ9 QUESTIONS 1 AND 2: 0

## 2023-11-21 NOTE — PATIENT INSTRUCTIONS
We will follow up on all comprehensive blood work once results are available and make any recommendations based on these results as may be indicated.  Please keep close follow-up with your specialist.  If you would like to proceed with a mammogram, we are happy to assist.  Thank you for keeping up with your routine vaccines.  Prescription refills have been sent to your local pharmacy as per your request.  If you have any questions or need additional refills, please feel free to contact us.  Otherwise, we are happy to see you annually for your wellness visits.

## 2023-11-22 LAB
MAGNESIUM SERPL-MCNC: 2.39 MG/DL (ref 1.6–2.4)
T4 FREE SERPL-MCNC: 1.23 NG/DL (ref 0.78–1.48)
TSH SERPL-ACNC: 6.28 MIU/L (ref 0.44–3.98)
VIT B12 SERPL-MCNC: 715 PG/ML (ref 211–911)

## 2023-11-24 ENCOUNTER — OFFICE VISIT (OUTPATIENT)
Dept: HEMATOLOGY/ONCOLOGY | Facility: CLINIC | Age: 78
End: 2023-11-24
Payer: MEDICARE

## 2023-11-24 VITALS
SYSTOLIC BLOOD PRESSURE: 140 MMHG | WEIGHT: 127.65 LBS | TEMPERATURE: 98.4 F | OXYGEN SATURATION: 99 % | HEART RATE: 75 BPM | RESPIRATION RATE: 16 BRPM | BODY MASS INDEX: 18.85 KG/M2 | DIASTOLIC BLOOD PRESSURE: 74 MMHG

## 2023-11-24 DIAGNOSIS — C82.96: ICD-10-CM

## 2023-11-24 DIAGNOSIS — E03.8 HYPOTHYROIDISM DUE TO HASHIMOTO'S THYROIDITIS: ICD-10-CM

## 2023-11-24 DIAGNOSIS — I10 PRIMARY HYPERTENSION: Primary | ICD-10-CM

## 2023-11-24 DIAGNOSIS — I43 CARDIAC AMYLOIDOSIS (MULTI): ICD-10-CM

## 2023-11-24 DIAGNOSIS — E06.3 HYPOTHYROIDISM DUE TO HASHIMOTO'S THYROIDITIS: ICD-10-CM

## 2023-11-24 DIAGNOSIS — D49.0 IPMN (INTRADUCTAL PAPILLARY MUCINOUS NEOPLASM): ICD-10-CM

## 2023-11-24 DIAGNOSIS — E85.4 CARDIAC AMYLOIDOSIS (MULTI): ICD-10-CM

## 2023-11-24 PROCEDURE — 3077F SYST BP >= 140 MM HG: CPT | Performed by: INTERNAL MEDICINE

## 2023-11-24 PROCEDURE — 3078F DIAST BP <80 MM HG: CPT | Performed by: INTERNAL MEDICINE

## 2023-11-24 PROCEDURE — 99214 OFFICE O/P EST MOD 30 MIN: CPT | Performed by: INTERNAL MEDICINE

## 2023-11-24 PROCEDURE — 1159F MED LIST DOCD IN RCRD: CPT | Performed by: INTERNAL MEDICINE

## 2023-11-24 PROCEDURE — 1160F RVW MEDS BY RX/DR IN RCRD: CPT | Performed by: INTERNAL MEDICINE

## 2023-11-24 PROCEDURE — 1036F TOBACCO NON-USER: CPT | Performed by: INTERNAL MEDICINE

## 2023-11-24 PROCEDURE — 1126F AMNT PAIN NOTED NONE PRSNT: CPT | Performed by: INTERNAL MEDICINE

## 2023-11-24 ASSESSMENT — PAIN SCALES - GENERAL: PAINLEVEL: 0-NO PAIN

## 2023-11-24 NOTE — PROGRESS NOTES
Patient ID: Adilene Polo is a 78 y.o. female.  Referring Physician: Rene Romero MD  [address] [facility] [address], [address] [place] [address]  Primary Care Provider: Ebony Carmona MD  Visit Type: Follow Up      Subjective    HPI is my lymphoma getting worse?    Review of Systems   Constitutional: Negative.    HENT:  Negative.     Eyes: Negative.    Respiratory: Negative.     Cardiovascular: Negative.    Gastrointestinal: Negative.    Endocrine: Negative.    Genitourinary: Negative.     Musculoskeletal: Negative.    Skin: Negative.    Neurological: Negative.    Hematological: Negative.    Psychiatric/Behavioral: Negative.          Objective   BSA: There is no height or weight on file to calculate BSA.  There were no vitals taken for this visit.     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   has a past surgical history that includes Cholecystectomy (10/28/2014); Gastric bypass (10/28/2014); Incisional breast biopsy (07/20/2018); Thyroidectomy, partial; and Whipple procedure w/ laparoscopy.  Family History   Problem Relation Name Age of Onset    Heart disease Mother          Coronary    Heart disease Father          Coronary    Heart attack Father      Prostate cancer Father      Breast cancer Sister      Kidney disease Brother      Lung cancer Brother      Prostate cancer Brother      Pulmonary embolism Brother       Oncology History    No history  exists.       Adilene Polo  reports that she quit smoking about 43 years ago. Her smoking use included cigarettes. She has never used smokeless tobacco.  She  reports no history of alcohol use.  She  reports no history of drug use.    Physical Exam  Vitals reviewed.   HENT:      Head: Normocephalic.      Mouth/Throat:      Mouth: Mucous membranes are moist.   Eyes:      Extraocular Movements: Extraocular movements intact.      Pupils: Pupils are equal, round, and reactive to light.   Cardiovascular:      Rate and Rhythm: Normal rate and regular rhythm.   Pulmonary:      Breath sounds: Normal breath sounds.   Abdominal:      General: Bowel sounds are normal.      Palpations: Abdomen is soft.   Genitourinary:     Comments: No lymphadenopathy palpable in either inguinal area  Musculoskeletal:         General: Normal range of motion.      Cervical back: Normal range of motion and neck supple.   Skin:     General: Skin is warm and dry.   Neurological:      General: No focal deficit present.      Mental Status: She is alert and oriented to person, place, and time.   Psychiatric:         Mood and Affect: Mood normal.         Behavior: Behavior normal.       WBC   Date/Time Value Ref Range Status   11/21/2023 01:18 PM 2.9 (L) 4.4 - 11.3 x10*3/uL Final   08/01/2023 11:20 AM 3.4 (L) 4.4 - 11.3 x10E9/L Final   02/02/2023 10:51 AM 5.8 4.4 - 11.3 x10E9/L Final   08/30/2022 12:50 PM 3.9 (L) 4.4 - 11.3 x10E9/L Final     nRBC   Date Value Ref Range Status   11/21/2023 0.0 0.0 - 0.0 /100 WBCs Final   04/28/2022 0.0 0.0 - 0.0 /100 WBC Final   07/16/2020 0.0 0.0 - 0.0 /100 WBC Final   05/11/2020 0.0 0.0 - 0.0 /100 WBC Final     RBC   Date Value Ref Range Status   11/21/2023 3.52 (L) 4.00 - 5.20 x10*6/uL Final   08/01/2023 3.61 (L) 4.00 - 5.20 x10E12/L Final   02/02/2023 3.78 (L) 4.00 - 5.20 x10E12/L Final   08/30/2022 3.65 (L) 4.00 - 5.20 x10E12/L Final     Hemoglobin   Date Value Ref Range Status   11/21/2023 11.3 (L) 12.0 - 16.0 g/dL  "Final   08/01/2023 11.4 (L) 12.0 - 16.0 g/dL Final   02/02/2023 11.8 (L) 12.0 - 16.0 g/dL Final   08/30/2022 11.4 (L) 12.0 - 16.0 g/dL Final     Hematocrit   Date Value Ref Range Status   11/21/2023 35.7 (L) 36.0 - 46.0 % Final   08/01/2023 34.7 (L) 36.0 - 46.0 % Final   02/02/2023 36.0 36.0 - 46.0 % Final   08/30/2022 35.5 (L) 36.0 - 46.0 % Final     MCV   Date/Time Value Ref Range Status   11/21/2023 01:18  (H) 80 - 100 fL Final   08/01/2023 11:20 AM 96 80 - 100 fL Final   02/02/2023 10:51 AM 95 80 - 100 fL Final   08/30/2022 12:50 PM 97 80 - 100 fL Final     MCH   Date/Time Value Ref Range Status   11/21/2023 01:18 PM 32.1 26.0 - 34.0 pg Final     MCHC   Date/Time Value Ref Range Status   11/21/2023 01:18 PM 31.7 (L) 32.0 - 36.0 g/dL Final   08/01/2023 11:20 AM 32.9 32.0 - 36.0 g/dL Final   02/02/2023 10:51 AM 32.8 32.0 - 36.0 g/dL Final   08/30/2022 12:50 PM 32.1 32.0 - 36.0 g/dL Final     RDW   Date/Time Value Ref Range Status   11/21/2023 01:18 PM 13.1 11.5 - 14.5 % Final   08/01/2023 11:20 AM 13.4 11.5 - 14.5 % Final   02/02/2023 10:51 AM 12.4 11.5 - 14.5 % Final   08/30/2022 12:50 PM 13.0 11.5 - 14.5 % Final     Platelets   Date/Time Value Ref Range Status   11/21/2023 01:18  150 - 450 x10*3/uL Final   08/01/2023 11:20  150 - 450 x10E9/L Final   02/02/2023 10:51  150 - 450 x10E9/L Final   08/30/2022 12:50  150 - 450 x10E9/L Final     No results found for: \"MPV\"  Neutrophils %   Date/Time Value Ref Range Status   11/21/2023 01:18 PM 57.7 40.0 - 80.0 % Final   08/01/2023 11:20 AM 51.6 40.0 - 80.0 % Final   02/02/2023 10:51 AM 78.2 40.0 - 80.0 % Final   08/30/2022 12:50 PM 61.6 40.0 - 80.0 % Final     Immature Granulocytes %, Automated   Date/Time Value Ref Range Status   11/21/2023 01:18 PM 0.3 0.0 - 0.9 % Final     Comment:     Immature Granulocyte Count (IG) includes promyelocytes, myelocytes and metamyelocytes but does not include bands. Percent differential counts (%) should " be interpreted in the context of the absolute cell counts (cells/UL).   08/01/2023 11:20 AM 0.9 0.0 - 0.9 % Final     Comment:      Immature Granulocyte Count (IG) includes promyelocytes,    myelocytes and metamyelocytes but does not include bands.   Percent differential counts (%) should be interpreted in the   context of the absolute cell counts (cells/L).     02/02/2023 10:51 AM 0.2 0.0 - 0.9 % Final     Comment:      Immature Granulocyte Count (IG) includes promyelocytes,    myelocytes and metamyelocytes but does not include bands.   Percent differential counts (%) should be interpreted in the   context of the absolute cell counts (cells/L).     08/30/2022 12:50 PM 0.0 0.0 - 0.9 % Final     Comment:      Immature Granulocyte Count (IG) includes promyelocytes,    myelocytes and metamyelocytes but does not include bands.   Percent differential counts (%) should be interpreted in the   context of the absolute cell counts (cells/L).       Lymphocytes %   Date/Time Value Ref Range Status   11/21/2023 01:18 PM 32.2 13.0 - 44.0 % Final   08/01/2023 11:20 AM 40.9 13.0 - 44.0 % Final   02/02/2023 10:51 AM 14.3 13.0 - 44.0 % Final   08/30/2022 12:50 PM 28.4 13.0 - 44.0 % Final     Monocytes %   Date/Time Value Ref Range Status   11/21/2023 01:18 PM 7.7 2.0 - 10.0 % Final   08/01/2023 11:20 AM 5.4 2.0 - 10.0 % Final   02/02/2023 10:51 AM 6.6 2.0 - 10.0 % Final   08/30/2022 12:50 PM 8.7 2.0 - 10.0 % Final     Eosinophils %   Date/Time Value Ref Range Status   11/21/2023 01:18 PM 1.4 0.0 - 6.0 % Final   08/01/2023 11:20 AM 0.6 0.0 - 6.0 % Final   02/02/2023 10:51 AM 0.2 0.0 - 6.0 % Final   08/30/2022 12:50 PM 0.8 0.0 - 6.0 % Final     Basophils %   Date/Time Value Ref Range Status   11/21/2023 01:18 PM 0.7 0.0 - 2.0 % Final   08/01/2023 11:20 AM 0.6 0.0 - 2.0 % Final   02/02/2023 10:51 AM 0.5 0.0 - 2.0 % Final   08/30/2022 12:50 PM 0.5 0.0 - 2.0 % Final     Neutrophils Absolute   Date/Time Value Ref Range Status   11/21/2023  "01:18 PM 1.65 1.60 - 5.50 x10*3/uL Final     Comment:     Percent differential counts (%) should be interpreted in the context of the absolute cell counts (cells/uL).   08/01/2023 11:20 AM 1.73 1.60 - 5.50 x10E9/L Final   02/02/2023 10:51 AM 4.54 1.60 - 5.50 x10E9/L Final   08/30/2022 12:50 PM 2.41 1.60 - 5.50 x10E9/L Final     Immature Granulocytes Absolute, Automated   Date/Time Value Ref Range Status   11/21/2023 01:18 PM 0.01 0.00 - 0.50 x10*3/uL Final     Lymphocytes Absolute   Date/Time Value Ref Range Status   11/21/2023 01:18 PM 0.92 0.80 - 3.00 x10*3/uL Final   08/01/2023 11:20 AM 1.37 0.80 - 3.00 x10E9/L Final   02/02/2023 10:51 AM 0.83 0.80 - 3.00 x10E9/L Final   08/30/2022 12:50 PM 1.11 0.80 - 3.00 x10E9/L Final     Monocytes Absolute   Date/Time Value Ref Range Status   11/21/2023 01:18 PM 0.22 0.05 - 0.80 x10*3/uL Final   08/01/2023 11:20 AM 0.18 0.05 - 0.80 x10E9/L Final   02/02/2023 10:51 AM 0.38 0.05 - 0.80 x10E9/L Final   08/30/2022 12:50 PM 0.34 0.05 - 0.80 x10E9/L Final     Eosinophils Absolute   Date/Time Value Ref Range Status   11/21/2023 01:18 PM 0.04 0.00 - 0.40 x10*3/uL Final   08/01/2023 11:20 AM 0.02 0.00 - 0.40 x10E9/L Final   02/02/2023 10:51 AM 0.01 0.00 - 0.40 x10E9/L Final   08/30/2022 12:50 PM 0.03 0.00 - 0.40 x10E9/L Final     Basophils Absolute   Date/Time Value Ref Range Status   11/21/2023 01:18 PM 0.02 0.00 - 0.10 x10*3/uL Final   08/01/2023 11:20 AM 0.02 0.00 - 0.10 x10E9/L Final   02/02/2023 10:51 AM 0.03 0.00 - 0.10 x10E9/L Final   08/30/2022 12:50 PM 0.02 0.00 - 0.10 x10E9/L Final       No components found for: \"PT\"  aPTT   Date/Time Value Ref Range Status   08/09/2022 01:40 PM 33 26 - 39 sec Final     Comment:       THE APTT IS NO LONGER USED FOR MONITORING     UNFRACTIONATED HEPARIN THERAPY.    FOR MONITORING HEPARIN THERAPY,     USE THE HEPARIN ASSAY.     06/17/2021 08:16 AM 33 25 - 35 sec Final     Comment:       THE APTT IS NO LONGER USED FOR MONITORING     " UNFRACTIONATED HEPARIN THERAPY.    FOR MONITORING HEPARIN THERAPY,     USE THE HEPARIN ASSAY.     03/02/2020 04:34 PM 63 (H) 28 - 38 sec Final     Comment:       THE APTT IS NO LONGER USED FOR MONITORING     UNFRACTIONATED HEPARIN THERAPY.    FOR MONITORING HEPARIN THERAPY,     USE THE HEPARIN ASSAY.       Medication Documentation Review Audit       Reviewed by Mary Campbell MA (Medical Assistant) on 11/24/23 at 1542      Medication Order Taking? Sig Documenting Provider Last Dose Status   blood sugar diagnostic (Accu-Chek SmartView Test Strip) strip [id number] Yes USE 1 STRIP TWICE DAILY. Brittany Ward MD Taking Active   CALCIUM ORAL [id number] Yes Take 1 tablet by mouth 1 (one) time each day. Brittany Ward MD Taking Active     Discontinued 11/21/23 1825   cyanocobalamin (Vitamin B-12) 1,000 mcg/mL injection [id number] Yes Inject 1 mL (1,000 mcg) into the muscle every 30 (thirty) days. Ebony Carmona MD Taking Active   docusate sodium (STOOL SOFTENER ORAL) [id number] Yes Take 1 capsule by mouth 1 (one) time each day. Brittany Ward MD Taking Active   levothyroxine (Synthroid, Levoxyl) 88 mcg tablet [id number] Yes Take 1 tablet (88 mcg) by mouth once daily. Historical Provider, MD Taking Active     Discontinued 11/21/23 1825   lidocaine (Lidoderm) 5 % patch [id number] Yes Place 1 patch over 12 hours on the skin once daily. APPLY 1 PATCH TO THE AFFECTED AREA AND LEAVE IN PLACE FOR 12 HOURS, THEN REMOVE AND LEAVE OFF FOR 12 HOURS. Ebony Carmona MD Taking Active   magnesium oxide (Mag-Ox) 250 mg magnesium tablet [id number] Yes Take 1 tablet (250 mg) by mouth 2 times a day. Brittany Ward MD Taking Active   metoprolol succinate XL (Toprol-XL) 25 mg 24 hr tablet [id number] Yes Take 1 tablet (25 mg) by mouth once daily. Historical Provider, MD Taking Active   NON FORMULARY [id number] Yes Vitamin D 50 MCG (2000 UT) Oral Tablet; Take 1 tablet daily Historical Provider, MD Taking Active   pancrelipase, Lip-Prot-Amyl, (Creon)  36,000-114,000- 180,000 unit capsule,delayed release(DR/EC) capsule [id number] No Take 4 capsules by mouth once daily.   Patient not taking: Reported on 11/24/2023    Ebony Carmona MD Not Taking Active   sulfaSALAzine (Azulfidine) 500 mg tablet [id number] No Take 2 tablets (1,000 mg) by mouth 2 times a day. Historical Provider, MD Not Taking Active     Discontinued 11/21/23 0941   tafamidis (Vyndamax) 61 mg capsule [id number] Yes Take 1 capsule (61 mg) by mouth once daily. Dejan Fortune, DO Taking Active   WHEAT DEXTRIN ORAL [id number] No Benefiber Oral Powder; Use as directed Historical Provider, MD Not Taking Active   zinc gluconate 50 mg tablet [id number] Yes Take 1 tablet (50 mg) by mouth once daily. 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 (20+ lymph nodes), 5% focus of grade 3B lymphoma found in 1 lymph node  -last saw Dr Ba Salas on 2/2/2023  -last saw DIONI Isabel on 8/1/2023 who ordered PET, and then instructed the patient will follow with Dr Oreilly in 6 months  -8/15/2023 PET scan reviewed--left para-aortic lymph node (secondary to lymphoma) is slightly enlarged and slightly PET avid  -reviewed 11/13/2023 CT scan--the same para-aortic lymph node is only marginally enlarged  -she has very low burden of lymphoma, has no constitutional symptoms, and meets no indication to treat  -she thinks she can feel enlarged nodes in her groins at times-examined her today--no palpable adenopathy at all in either inguinal area  -will return for next followup in 1 year    2) cardiac amyloidosis  -follows with Dr Romero  -waiting for Vyndamax to be approved by her insurance    3) hypothyroidism  -on levothyroxine    4) hypertension  -on metoprolol    5) IPMN  -s/p Whipple resection by Dr Ventura on 3/2/2020  -on creon for pancreatic insufficiency       Problem List Items Addressed This Visit              ICD-10-CM    Follicular lymphoma (CMS/HCC) C82.90    Relevant Orders    Clinic Appointment Request Follow Up; TERRY PANDEY; [facility] MEDON[facility]    CBC and Auto Differential    Comprehensive metabolic panel    Lactate dehydrogenase            Terry Pandey MD                          Cyclophosphamide Pregnancy And Lactation Text: This medication is Pregnancy Category D and it isn't considered safe during pregnancy. This medication is excreted in breast milk.

## 2023-11-26 PROBLEM — I43 CARDIAC AMYLOIDOSIS (MULTI): Status: ACTIVE | Noted: 2023-11-26

## 2023-11-26 PROBLEM — E85.4 CARDIAC AMYLOIDOSIS (MULTI): Status: ACTIVE | Noted: 2023-11-26

## 2023-11-26 ASSESSMENT — ENCOUNTER SYMPTOMS
HEMATOLOGIC/LYMPHATIC NEGATIVE: 1
GASTROINTESTINAL NEGATIVE: 1
CONSTITUTIONAL NEGATIVE: 1
RESPIRATORY NEGATIVE: 1
PSYCHIATRIC NEGATIVE: 1
MUSCULOSKELETAL NEGATIVE: 1
NEUROLOGICAL NEGATIVE: 1
CARDIOVASCULAR NEGATIVE: 1
ENDOCRINE NEGATIVE: 1
EYES NEGATIVE: 1

## 2023-12-04 ENCOUNTER — TELEPHONE (OUTPATIENT)
Dept: PRIMARY CARE | Facility: CLINIC | Age: 78
End: 2023-12-04

## 2023-12-05 DIAGNOSIS — E03.8 HYPOTHYROIDISM DUE TO HASHIMOTO'S THYROIDITIS: Primary | ICD-10-CM

## 2023-12-05 DIAGNOSIS — E11.9 TYPE 2 DIABETES MELLITUS WITHOUT COMPLICATION, WITHOUT LONG-TERM CURRENT USE OF INSULIN (MULTI): ICD-10-CM

## 2023-12-05 DIAGNOSIS — E06.3 HYPOTHYROIDISM DUE TO HASHIMOTO'S THYROIDITIS: Primary | ICD-10-CM

## 2023-12-05 RX ORDER — DEXTROSE 4 G
TABLET,CHEWABLE ORAL
Qty: 1 EACH | Refills: 0 | Status: SHIPPED | OUTPATIENT
Start: 2023-12-05

## 2023-12-05 RX ORDER — LEVOTHYROXINE SODIUM 100 UG/1
100 TABLET ORAL
Qty: 90 TABLET | Refills: 1 | Status: SHIPPED | OUTPATIENT
Start: 2023-12-05 | End: 2024-06-06 | Stop reason: SDUPTHER

## 2023-12-05 NOTE — TELEPHONE ENCOUNTER
Patient notified, would like to proceed with the medication increase. She would also like to know if she can get an script for an accu check machine she say's she broke her's. Thanks

## 2023-12-21 ENCOUNTER — OFFICE VISIT (OUTPATIENT)
Dept: GENETICS | Facility: CLINIC | Age: 78
End: 2023-12-21
Payer: MEDICARE

## 2023-12-21 ENCOUNTER — OFFICE VISIT (OUTPATIENT)
Dept: CARDIOLOGY | Facility: CLINIC | Age: 78
End: 2023-12-21
Payer: MEDICARE

## 2023-12-21 VITALS
SYSTOLIC BLOOD PRESSURE: 100 MMHG | BODY MASS INDEX: 19.26 KG/M2 | DIASTOLIC BLOOD PRESSURE: 70 MMHG | OXYGEN SATURATION: 99 % | HEIGHT: 69 IN | WEIGHT: 130 LBS | HEART RATE: 75 BPM

## 2023-12-21 VITALS
HEIGHT: 70 IN | HEART RATE: 80 BPM | BODY MASS INDEX: 18.75 KG/M2 | WEIGHT: 131 LBS | TEMPERATURE: 98.1 F | DIASTOLIC BLOOD PRESSURE: 65 MMHG | SYSTOLIC BLOOD PRESSURE: 109 MMHG

## 2023-12-21 DIAGNOSIS — E85.4 CARDIAC AMYLOIDOSIS (MULTI): Primary | ICD-10-CM

## 2023-12-21 DIAGNOSIS — R20.0 NUMBNESS AND TINGLING OF BOTH UPPER EXTREMITIES: ICD-10-CM

## 2023-12-21 DIAGNOSIS — R20.0 NUMBNESS AND TINGLING OF BOTH LOWER EXTREMITIES: ICD-10-CM

## 2023-12-21 DIAGNOSIS — I87.2 VENOUS INSUFFICIENCY: Primary | ICD-10-CM

## 2023-12-21 DIAGNOSIS — I43 CARDIAC AMYLOIDOSIS (MULTI): Primary | ICD-10-CM

## 2023-12-21 DIAGNOSIS — R20.2 NUMBNESS AND TINGLING OF BOTH UPPER EXTREMITIES: ICD-10-CM

## 2023-12-21 DIAGNOSIS — R20.2 NUMBNESS AND TINGLING OF BOTH LOWER EXTREMITIES: ICD-10-CM

## 2023-12-21 PROCEDURE — 3078F DIAST BP <80 MM HG: CPT | Performed by: INTERNAL MEDICINE

## 2023-12-21 PROCEDURE — 3074F SYST BP LT 130 MM HG: CPT | Performed by: MEDICAL GENETICS

## 2023-12-21 PROCEDURE — 99214 OFFICE O/P EST MOD 30 MIN: CPT | Performed by: INTERNAL MEDICINE

## 2023-12-21 PROCEDURE — 1036F TOBACCO NON-USER: CPT | Performed by: INTERNAL MEDICINE

## 2023-12-21 PROCEDURE — 1159F MED LIST DOCD IN RCRD: CPT | Performed by: INTERNAL MEDICINE

## 2023-12-21 PROCEDURE — 3078F DIAST BP <80 MM HG: CPT | Performed by: MEDICAL GENETICS

## 2023-12-21 PROCEDURE — 1126F AMNT PAIN NOTED NONE PRSNT: CPT | Performed by: INTERNAL MEDICINE

## 2023-12-21 PROCEDURE — 1126F AMNT PAIN NOTED NONE PRSNT: CPT | Performed by: MEDICAL GENETICS

## 2023-12-21 PROCEDURE — 99205 OFFICE O/P NEW HI 60 MIN: CPT | Performed by: MEDICAL GENETICS

## 2023-12-21 PROCEDURE — 1159F MED LIST DOCD IN RCRD: CPT | Performed by: MEDICAL GENETICS

## 2023-12-21 PROCEDURE — 1160F RVW MEDS BY RX/DR IN RCRD: CPT | Performed by: INTERNAL MEDICINE

## 2023-12-21 PROCEDURE — 1036F TOBACCO NON-USER: CPT | Performed by: MEDICAL GENETICS

## 2023-12-21 PROCEDURE — 3074F SYST BP LT 130 MM HG: CPT | Performed by: INTERNAL MEDICINE

## 2023-12-21 PROCEDURE — 1160F RVW MEDS BY RX/DR IN RCRD: CPT | Performed by: MEDICAL GENETICS

## 2023-12-21 ASSESSMENT — ENCOUNTER SYMPTOMS
PSYCHIATRIC NEGATIVE: 1
MYALGIAS: 1
NEUROLOGICAL NEGATIVE: 1
RESPIRATORY NEGATIVE: 1
DEPRESSION: 0
OCCASIONAL FEELINGS OF UNSTEADINESS: 0
LOSS OF SENSATION IN FEET: 0
FATIGUE: 1
HEMATOLOGIC/LYMPHATIC NEGATIVE: 1
GASTROINTESTINAL NEGATIVE: 1
EYES NEGATIVE: 1

## 2023-12-21 ASSESSMENT — PATIENT HEALTH QUESTIONNAIRE - PHQ9
2. FEELING DOWN, DEPRESSED OR HOPELESS: NOT AT ALL
1. LITTLE INTEREST OR PLEASURE IN DOING THINGS: NOT AT ALL
SUM OF ALL RESPONSES TO PHQ9 QUESTIONS 1 AND 2: 0

## 2023-12-21 NOTE — LETTER
01/22/24    Ebony Carmona MD  [address]  [facility]  [facility] [address]      Dear Dr. Ebony Carmona MD,    I am writing to confirm that your patient, Adilene Polo  received care in my office on 01/22/24. I have enclosed a summary of the care provided to Adilene for your reference.    Please contact me with any questions you may have regarding the visit.    Sincerely,         Soraida Hodge MD PhD  [address] [facility] [address]  [facility] [id number]    CC: No Recipients

## 2023-12-21 NOTE — PROGRESS NOTES
He is here Subjective   Patient ID: Adilene Polo is a 78 y.o. female who presents for FUV    HPI   Very pleasant 78 year old female with hx of lymphoma presents here for bilateral lower extremity edema and evaluation of venous insufficiency. Patient wears compression stockings.  Denies any spontaneous ulceration or bleeding. She gets nocturnal cramps especially when she stretches her legs. She complains of leg fatigue and severe heaviness and symptoms worsening at the end of the day. Pt notes her sx have largely affected her quality of life and daily activities.   Denies any personal hx or Fhx of DVTs in the leg.   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   Pt has hx of lymphoma( cannot palpate any pelvic lymph nodes) , CT scan recently showed enlarged periaortic retroperitoneal lymph nodes more on the left side. She has seen Dr Pandey at oncology who confirmed presence of non-Hodgkin's lymphoma and slight enlargement of pelvic/groin lymph nodes. With low burden of lymphoma and lack of constitutional sx, oncology has decided against any treatment at this point.     Venous insufficiency study done on 9/9/23:   Right Lower Venous Insufficiency: Right leg demonstrates no evidence of deep vein thrombosis or deep or superficial venous insufficiency. A cystic structure is noted in the right popliteal fossa.  Left Lower Venous Insufficiency: Reflux is noted in the proximal calf great saphenous and mid calf great saphenous veins.  Left Lower Venous: No evidence of acute deep vein thrombus visualized in the left lower extremity.    Review of Systems   Constitutional:  Positive for fatigue.   HENT: Negative.     Eyes: Negative.    Respiratory: Negative.     Cardiovascular:  Positive for leg swelling.   Gastrointestinal: Negative.    Musculoskeletal:  Positive for myalgias.   Skin: Negative.    Neurological: Negative.    Hematological: Negative.    Psychiatric/Behavioral: Negative.     All other systems reviewed and are  "negative.      Objective   /70 (BP Location: Left arm, Patient Position: Sitting)   Pulse 75   Ht 1.753 m (5' 9\")   Wt 59 kg (130 lb)   SpO2 99%   BMI 19.20 kg/m²     Physical Exam  Vitals reviewed.   Constitutional:       Appearance: Normal appearance.   HENT:      Head: Normocephalic and atraumatic.   Cardiovascular:      Rate and Rhythm: Normal rate and regular rhythm.      Pulses: Normal pulses.      Heart sounds: Normal heart sounds.   Pulmonary:      Effort: Pulmonary effort is normal.      Breath sounds: Normal breath sounds.   Musculoskeletal:      Cervical back: Normal range of motion.      Right lower leg: Edema present.      Left lower leg: Edema present.   Neurological:      General: No focal deficit present.      Mental Status: She is alert and oriented to person, place, and time.   Psychiatric:         Mood and Affect: Mood normal.         Behavior: Behavior normal.         Assessment/Plan   Very pleasant 78 year old female with bilateral LE edema more pronounced on the left lower extremity and venous insufficiency, left worse than right. She has been wearing compression stockings for a while but is not seeing improvement. Pt has hx of lymphoma( cannot palpate any pelvic lymph nodes) , CT scan recently showed enlarged periaortic retroperitoneal lymph nodes more on the left side which may be the cause of unilateral left leg swelling more than right. She has seen Dr Pandey at oncology who confirmed presence of non-Hodgkin's lymphoma and slight enlargement of pelvic/groin lymph nodes. With low burden of lymphoma and lack of constitutional sx, oncology has decided against any treatment at this point. She complains of leg fatigue and severe heaviness and symptoms worsening at the end of the day.Pt notes her sx have largely affected her quality of life and daily activities.     1- Patient is advised to adhere to compression Velcro garments for the next 3 months, especially when sitting or standing " for prolonged hours.  (Patient has evidence of left GSV reflux at the proximal and mid calf, patient is symptomatically with leg swelling and definitely it is affecting her quality of life and daily activities)  2- Patient will benefit from left GSV ablation or radiofrequency ablation. If symptoms do not improve in 3 months, will proceed with this.  3- Follow up in 3 months for reassessment.     Scribe Attestation  By signing my name below, IPrema Scribe   attest that this documentation has been prepared under the direction and in the presence of Rene Romero MD.

## 2024-01-01 DIAGNOSIS — I47.10 SUPRAVENTRICULAR TACHYCARDIA (CMS-HCC): ICD-10-CM

## 2024-01-02 RX ORDER — METOPROLOL SUCCINATE 25 MG/1
25 TABLET, EXTENDED RELEASE ORAL DAILY
Qty: 90 TABLET | Refills: 3 | Status: SHIPPED | OUTPATIENT
Start: 2024-01-02

## 2024-01-04 ENCOUNTER — APPOINTMENT (OUTPATIENT)
Dept: CARDIOLOGY | Facility: HOSPITAL | Age: 79
End: 2024-01-04
Payer: MEDICARE

## 2024-01-15 ENCOUNTER — APPOINTMENT (OUTPATIENT)
Dept: DERMATOLOGY | Facility: HOSPITAL | Age: 79
End: 2024-01-15
Payer: MEDICARE

## 2024-01-22 ENCOUNTER — APPOINTMENT (OUTPATIENT)
Dept: NEUROLOGY | Facility: CLINIC | Age: 79
End: 2024-01-22
Payer: MEDICARE

## 2024-01-23 NOTE — PROGRESS NOTES
"Adilene Polo is a 78 year old referred for evaluation for amyloidosis.  History is obtained from her and the electronic medical record.    She notes that she first developed hypertension with menopause.  She has been followed with serial cardiac imaging.  She notes that a previous echocardiogram done ~7 years ago did not note any \"speckles\".  A study done most recently noted a speckled appearance to the myocardium.  This raised the concern for cardiac amyloidosis.    She is not troubled by chest pain or shortness of breath.  She has experienced palpitations since she was in her 60's.  She fainted once and was admitted for further evaluation and referral to Cardiology. She was started on metoprolol, which she feels helps. She also notes occasional episodes of light headedness.      She also notes numbness and tingling in her hands and feet; these have been progressing over the past few years.  She has shooting pain in her legs at night.  She feels that her  is weak.  She was diagnosed with carpal tunnel syndrome in her 50's and had surgery on both wrists.  She denies occupation or hobby that involved repetitive motions.    other: rheumatoid arthritis diagnosed in her 70's; osteoarthritis; gastric bypass surgery 14 years ago; GI bleed that led to kidney damage from hypotension/ hypoperfusion; partial pancreatectomy for neoplasms that were non-cancerous; follicular/ non-Hodgkin's lymphoma in     Birth/ developmental hx: Born at term; birth weight was 7 pounds 8 ounces.  No concerns about development; did not require ST, OT, or PT.    FAMILY HISTORY:  A four generation pedigree was obtained.  Pertinent findings include:  - Brother  at 76 from congestive heart failure  - Brother  at 62 from end-stage renal disease  - Mother  at 80 from congestive heart failure  - Father  at 79; he had coronary artery disease and a heart attack  - Paternal grandmother  in her 50's from a stroke  - Paternal aunt "  at 81 from heart issues, no additional details available    RESULTS:  Echocardiogram, 2023  CONCLUSIONS:  1. Left ventricular systolic function is normal with a 55-60% estimated ejection fraction.  2. The mean GLPS is 14.2%, which is diminished, with relatively normal strain values involving the apex. The myocardium has somewhat of a speckled appearance. These two findings raise the question of possible cardiac amyloidosis.  3. There is moderate to severe concentric left ventricular hypertrophy.  4. The left atrium is moderately dilated.  5. The estimated RVSP is 19 mm.    DISCUSSION:  Adilene Polo is a 78 year old with speckled appearance to the myocardium, history of bilateral carpal tunnel surgery, and current numbness/ tingling in her extremities.  Her features are suggestive of amyloidosis.    Amyloidosis can be hereditary/ genetic or sporadic.  Hereditary transthyretin amyloidosis (hATTR amyloidosis) is a condition characterized by amyloid deposits in the various areas of the body, including peripheral and autonomic nerves, the GI tract, kidneys, eyes, heart, and connective tissue. This is an autosomal dominant condition, with the most common mutations found within the TTR gene. The disease typically manifests later in life.    Genetic testing is recommended for Ms Polo to identify an etiology for her medical issues.     We discussed the logistics of genetic testing.  The consent form was reviewed and signed. Potential results of genetic testing were discussed, including positive (an alteration is identified that is known to predispose to amyloidosis); negative (no alterations are detected in any of the genes); variant of uncertain significance (an alteration is found in a gene, but it is not known if that change will result in disease).  A buccal swab sample was collected today.  Authorization will be obtained from the insurance company.  Results are usually available in 4 weeks from the time  the sample is received in the lab.  She will be contacted once results are completed to arrange appropriate follow up.    time:  prep: 5 minutes  face to face: 54 minutes  other activities related to patient care: 12 minutes  documentation: 15 minutes  total: 86 minutes

## 2024-02-01 ENCOUNTER — OFFICE VISIT (OUTPATIENT)
Dept: CARDIOLOGY | Facility: HOSPITAL | Age: 79
End: 2024-02-01
Payer: MEDICARE

## 2024-02-01 ENCOUNTER — TELEPHONE (OUTPATIENT)
Dept: GENETICS | Facility: CLINIC | Age: 79
End: 2024-02-01
Payer: MEDICARE

## 2024-02-01 VITALS
WEIGHT: 122.6 LBS | SYSTOLIC BLOOD PRESSURE: 135 MMHG | HEART RATE: 74 BPM | OXYGEN SATURATION: 97 % | DIASTOLIC BLOOD PRESSURE: 81 MMHG | BODY MASS INDEX: 18.16 KG/M2 | HEIGHT: 69 IN

## 2024-02-01 DIAGNOSIS — I47.10 PAROXYSMAL SUPRAVENTRICULAR TACHYCARDIA (CMS-HCC): ICD-10-CM

## 2024-02-01 DIAGNOSIS — I43 CARDIAC AMYLOIDOSIS (MULTI): Primary | ICD-10-CM

## 2024-02-01 DIAGNOSIS — E85.4 CARDIAC AMYLOIDOSIS (MULTI): Primary | ICD-10-CM

## 2024-02-01 PROCEDURE — 3079F DIAST BP 80-89 MM HG: CPT | Performed by: INTERNAL MEDICINE

## 2024-02-01 PROCEDURE — 99214 OFFICE O/P EST MOD 30 MIN: CPT | Performed by: INTERNAL MEDICINE

## 2024-02-01 PROCEDURE — 1160F RVW MEDS BY RX/DR IN RCRD: CPT | Performed by: INTERNAL MEDICINE

## 2024-02-01 PROCEDURE — 3075F SYST BP GE 130 - 139MM HG: CPT | Performed by: INTERNAL MEDICINE

## 2024-02-01 PROCEDURE — 1159F MED LIST DOCD IN RCRD: CPT | Performed by: INTERNAL MEDICINE

## 2024-02-01 PROCEDURE — 1036F TOBACCO NON-USER: CPT | Performed by: INTERNAL MEDICINE

## 2024-02-01 PROCEDURE — 1126F AMNT PAIN NOTED NONE PRSNT: CPT | Performed by: INTERNAL MEDICINE

## 2024-02-01 PROCEDURE — 1157F ADVNC CARE PLAN IN RCRD: CPT | Performed by: INTERNAL MEDICINE

## 2024-02-01 RX ORDER — MULTIVITAMIN/IRON/FOLIC ACID 18MG-0.4MG
1 TABLET ORAL DAILY
COMMUNITY

## 2024-02-01 RX ORDER — DENOSUMAB 60 MG/ML
60 INJECTION SUBCUTANEOUS
COMMUNITY

## 2024-02-01 ASSESSMENT — COLUMBIA-SUICIDE SEVERITY RATING SCALE - C-SSRS
1. IN THE PAST MONTH, HAVE YOU WISHED YOU WERE DEAD OR WISHED YOU COULD GO TO SLEEP AND NOT WAKE UP?: NO
2. HAVE YOU ACTUALLY HAD ANY THOUGHTS OF KILLING YOURSELF?: NO
6. HAVE YOU EVER DONE ANYTHING, STARTED TO DO ANYTHING, OR PREPARED TO DO ANYTHING TO END YOUR LIFE?: NO

## 2024-02-01 ASSESSMENT — PAIN SCALES - GENERAL: PAINLEVEL: 0-NO PAIN

## 2024-02-01 ASSESSMENT — ENCOUNTER SYMPTOMS
DEPRESSION: 0
LOSS OF SENSATION IN FEET: 0
OCCASIONAL FEELINGS OF UNSTEADINESS: 0

## 2024-02-01 NOTE — PROGRESS NOTES
[facility] Failure [facility]  Primary Care Physician: Ebony Carmona MD  Referring Provider/Cardiologist: Irma (/[facility])     Date of Visit: 2024  4:40 PM EST  Location of visit: [facility]     HPI:   Ms. Polo is a 78F with a PMHx sig for RA, SVT, DM, h/o gastric bypass, h/o intraductal papillary mucinous neoplasm s/p whipple, hypothyroidism, and stage C diastolic HFpEF/restrictive CM secondary to cardiac amyloidosis who returns to the Advanced Heart Failure clinic for ongoing evaluation and management.      Interval hx:   Currently reports chest pain at night time and palpitations. Denies shortness of breath, dyspnea on exertion, orthopnea, PND. Edema noted in BLE. Patient denies headaches, dizziness, or recent falls.     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     Hospitalizations: Denies    SocHx:  Lives with  in [place]    FamHx:  Mother with HF  Father with CAD and prostate CA  Brother  of ESRD       Current Outpatient Medications   Medication Sig Dispense Refill    b complex 0.4 mg tablet Take 1 tablet by mouth once daily.      blood sugar diagnostic (Accu-Chek SmartView Test Strip) strip USE 1 STRIP TWICE DAILY.      blood-glucose meter misc Check sugars daily 1 each 0    CALCIUM ORAL Take 1 tablet by mouth 1 (one) time each day.      cyanocobalamin (Vitamin B-12) 1,000 mcg/mL injection Inject 1 mL (1,000 mcg) into the muscle every 30 (thirty) days. 1 mL 11    denosumab (Prolia) 60 mg/mL syringe Inject 1 mL (60 mg total) under the skin every 6 months.      docusate sodium (STOOL SOFTENER ORAL) Take 1 capsule by mouth 1 (one) time each day.      levothyroxine (Synthroid, Levoxyl) 100 mcg tablet Take 1 tablet (100 mcg) by mouth once daily in the morning. Take before meals. 90 tablet 1    lidocaine (Lidoderm) 5 % patch Place 1 patch  "over 12 hours on the skin once daily. APPLY 1 PATCH TO THE AFFECTED AREA AND LEAVE IN PLACE FOR 12 HOURS, THEN REMOVE AND LEAVE OFF FOR 12 HOURS. 30 patch 2    magnesium oxide (Mag-Ox) 250 mg magnesium tablet Take 1 tablet (250 mg) by mouth 2 times a day.      metoprolol succinate XL (Toprol-XL) 25 mg 24 hr tablet TAKE 1 TABLET BY MOUTH EVERY DAY 90 tablet 3    NON FORMULARY Vitamin D 50 MCG (2000 UT) Oral Tablet; Take 1 tablet daily      sulfaSALAzine (Azulfidine) 500 mg tablet Take 2 tablets (1,000 mg) by mouth 2 times a day. 1,000 mg in the morning and 1500mg at night      tafamidis (Vyndamax) 61 mg capsule Take 1 capsule (61 mg) by mouth once daily. 30 capsule 11    WHEAT DEXTRIN ORAL Benefiber Oral Powder; Use as directed      zinc gluconate 50 mg tablet Take 1 tablet (50 mg) by mouth once daily.      pancrelipase, Lip-Prot-Amyl, (Creon) 36,000-114,000- 180,000 unit capsule,delayed release(DR/EC) capsule Take 4 capsules by mouth once daily. (Patient not taking: Reported on 12/21/2023) 360 capsule 0     No current facility-administered medications for this visit.       Allergies   Allergen Reactions    Cephalosporins Anaphylaxis    Penicillins Anaphylaxis    Ace Inhibitors Cough    Atorvastatin Unknown     Elevated LFT    Iodinated Contrast Media Unknown         Visit Vitals  /81 (BP Location: Left arm, Patient Position: Sitting, BP Cuff Size: Adult)   Pulse 74   Ht 1.753 m (5' 9\")   Wt 55.6 kg (122 lb 9.6 oz)   SpO2 97%   BMI 18.10 kg/m²   Smoking Status Former   BSA 1.65 m²        Physical Exam:  On exam Ms. Polo appears her stated age, is alert and oriented x3, and in no acute distress. Her sclera are anicteric and her oropharynx has moist mucous membranes. Her neck is supple and without thyromegaly. The JVP is ~8 cm of water above the right atrium. Her cardiac exam has regular rhythm, normal S1, S2. No S3/4. There are no murmurs. Her lungs are clear to auscultation bilaterally and there is no dullness " to percussion. Her abdomen is soft, nontender with normoactive bowel sounds. There is no HJR. The extremities are warm and with 1+ pitting edema. The skin is dry. There is no rash present. The distal pulses are 2+ in all four extremities. Her mood and affect are appropriate for todays encounter.       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     ECG (9/28/23):  Sinus rhythm (HR 70), no evidence of low voltage    Echo (9/8/23):  1. Left ventricular systolic function is normal with a 55-60% estimated ejection fraction.  2. The mean GLPS is 14.2%, which is diminished, with relatively normal strain values involving the apex. The myocardium has somewhat of a speckled appearance. These two findings raise the question of possible cardiac amyloidosis.  3. There is moderate to severe concentric left ventricular hypertrophy.  4. The left atrium is moderately dilated.  5. The estimated RVSP is 19 mm.      Impression/Plan:  Ms. Polo is a 78F with a PMHx sig for RA, SVT, DM, h/o gastric bypass, h/o intraductal papillary mucinous neoplasm s/p whipple, hypothyroidism, and stage C diastolic HFpEF/restrictive CM secondary to cardiac amyloidosis who returns to the Advanced Heart Failure clinic for ongoing evaluation and management. At the current time she has functional class II symptoms and appears hypo/euvolemic.     1) Stage C chronic diastolic HF/HFpEF/restrictive CM secondary to hATTR (c.238A>G; p.[id number]) cardiac amyloidosis  Work up consistent with hATTR CA. Main issue now is  symptomatic polyneuropathy, likely related to her amyloidosis.    -c/w vyndamax 61 mg daily  -f/u with genetics re: family testing  -f/u with neurology re: polyneuropathy    2) pSVT  Currently on beta blocker; may need to reconsider therapy in the future if BB starts to affect her CO.      F/U: 6 months at /[facility]    Mychal,  Thank you for referring Ms. Polo to the  Advanced Heart Failure Clinic. Please let me know if you have any questions.       ____________________________________________________________  Dejan Fortune DO  Section of Advanced Heart Failure and Cardiac Transplantation  Division of Cardiovascular Medicine  [facility] [place]  [facility]

## 2024-02-01 NOTE — PATIENT INSTRUCTIONS
It was a pleasure seeing you today. Please contact myself or my team with any questions.     To reach Dr. Fortune' office please call [phone] ([place]).   Fax: [phone]   To schedule an appointment call [phone]     If you have any questions or need cardiac medication refills, please call the Heart Failure office at [phone], option [phone]. You may also contact the  Heart Failure Nursing team via email at [email or web address] (Please include your name and date of birth).         1) Continue your current medications  2) We will wait for the screening results of your children  3) Follow up in 6 months at /[facility]

## 2024-02-01 NOTE — TELEPHONE ENCOUNTER
Phone call to Adilene Polo with results of genetic testing.  She was originally seen in the Cardiogenetics Clinic in December 2023.  Following the appointment, a sample was sent for genetic testing.    RESULTS:  Single gene sequencing of the TTR gene was performed.    A pathogenic variant was detected:  - TTR c.238A>G (p.[id number])    No variants of uncertain significance were detected.    Testing was performed by [facility] and consisted of next generation sequencing of the exons and flanking intronic regions of the TTR gene.    DISCUSSION:  Genetic testing detected a disease- causing mutation in the TTR gene.  This is consistent with the diagnosis of hereditary transthyretin amyloidosis (hATTR).    hATTR amyloidosis is characterized by amyloid deposits in the peripheral and autonomic nerves, heart, gastrointestinal tract, kidneys, eyes and/or connective tissue. Clinical features include progressive sensorimotor and/or autonomic neuropathy, cardiomyopathy, cardiac conduction disease, vitreous opacification, nephropathy, and/or symptoms of central nervous system impairment (PMID: 22214105). While individuals may initially present with neuropathy or cardiomyopathy, symptoms from multiple organ systems can develop (PMID: 23475888).     hATTR most commonly results from mutations in the TTR gene. It is an autosomal dominant condition, meaning that a mutation in one copy of the TTR gene is enough to predispose to amyloid deposition.  The disease typically manifests later in life and not everyone who has a mutation in the TTR gene will develop symptoms.    Ms Polo's family members are at risk to have the same alteration in the TTR gene that she does.  They can undergo genetic testing if they are interested in knowing their risks to develop amyloidosis.  We are happy to coordinate testing for relatives who are in [place].  For those who live out of [facility], they can take a copy of her test report to a Genetics Clinic near  them.  The lab that performed her testing offers testing at no-charge to family members if performed before the end of April.  There will be a charge for the office visit and phlebotomy fee, if a blood sample is collected.    Soraida Hodge MD, PhD  , Genetics

## 2024-02-01 NOTE — LETTER
2024     Mychal Solis MD  [address]  [facility] [address]    Patient: Adilene Polo   YOB: 1945   Date of Visit: 2024       Dear Dr. Mychal Solis MD:    Thank you for referring Adilene Polo to me for evaluation. Below are my notes for this consultation.  If you have questions, please do not hesitate to call me. I look forward to following your patient along with you.       Sincerely,     Dejan Fortune, DO      CC: No Recipients  ______________________________________________________________________________________    [facility] Failure [facility]  Primary Care Physician: Ebony Carmona MD  Referring Provider/Cardiologist: Irma (/[facility])     Date of Visit: 2024  4:40 PM EST  Location of visit: [facility]     HPI:   Ms. Polo is a 78F with a PMHx sig for RA, SVT, DM, h/o gastric bypass, h/o intraductal papillary mucinous neoplasm s/p whipple, hypothyroidism, and stage C diastolic HFpEF/restrictive CM secondary to cardiac amyloidosis who returns to the Advanced Heart Failure clinic for ongoing evaluation and management.      Interval hx:   Currently reports chest pain at night time and palpitations. Denies shortness of breath, dyspnea on exertion, orthopnea, PND. Edema noted in BLE. Patient denies headaches, dizziness, or recent falls.     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     Hospitalizations: Denies    SocHx:  Lives with  in [place]    FamHx:  Mother with HF  Father with CAD and prostate CA  Brother  of ESRD       Current Outpatient Medications   Medication Sig Dispense Refill   • b complex 0.4 mg tablet Take 1 tablet by mouth once daily.     • blood sugar diagnostic (Accu-Chek SmartView Test Strip) strip USE 1 STRIP TWICE DAILY.     • blood-glucose meter misc Check  sugars daily 1 each 0   • CALCIUM ORAL Take 1 tablet by mouth 1 (one) time each day.     • cyanocobalamin (Vitamin B-12) 1,000 mcg/mL injection Inject 1 mL (1,000 mcg) into the muscle every 30 (thirty) days. 1 mL 11   • denosumab (Prolia) 60 mg/mL syringe Inject 1 mL (60 mg total) under the skin every 6 months.     • docusate sodium (STOOL SOFTENER ORAL) Take 1 capsule by mouth 1 (one) time each day.     • levothyroxine (Synthroid, Levoxyl) 100 mcg tablet Take 1 tablet (100 mcg) by mouth once daily in the morning. Take before meals. 90 tablet 1   • lidocaine (Lidoderm) 5 % patch Place 1 patch over 12 hours on the skin once daily. APPLY 1 PATCH TO THE AFFECTED AREA AND LEAVE IN PLACE FOR 12 HOURS, THEN REMOVE AND LEAVE OFF FOR 12 HOURS. 30 patch 2   • magnesium oxide (Mag-Ox) 250 mg magnesium tablet Take 1 tablet (250 mg) by mouth 2 times a day.     • metoprolol succinate XL (Toprol-XL) 25 mg 24 hr tablet TAKE 1 TABLET BY MOUTH EVERY DAY 90 tablet 3   • NON FORMULARY Vitamin D 50 MCG (2000 UT) Oral Tablet; Take 1 tablet daily     • sulfaSALAzine (Azulfidine) 500 mg tablet Take 2 tablets (1,000 mg) by mouth 2 times a day. 1,000 mg in the morning and 1500mg at night     • tafamidis (Vyndamax) 61 mg capsule Take 1 capsule (61 mg) by mouth once daily. 30 capsule 11   • WHEAT DEXTRIN ORAL Benefiber Oral Powder; Use as directed     • zinc gluconate 50 mg tablet Take 1 tablet (50 mg) by mouth once daily.     • pancrelipase, Lip-Prot-Amyl, (Creon) 36,000-114,000- 180,000 unit capsule,delayed release(DR/EC) capsule Take 4 capsules by mouth once daily. (Patient not taking: Reported on 12/21/2023) 360 capsule 0     No current facility-administered medications for this visit.       Allergies   Allergen Reactions   • Cephalosporins Anaphylaxis   • Penicillins Anaphylaxis   • Ace Inhibitors Cough   • Atorvastatin Unknown     Elevated LFT   • Iodinated Contrast Media Unknown         Visit Vitals  /81 (BP Location: Left arm,  "Patient Position: Sitting, BP Cuff Size: Adult)   Pulse 74   Ht 1.753 m (5' 9\")   Wt 55.6 kg (122 lb 9.6 oz)   SpO2 97%   BMI 18.10 kg/m²   Smoking Status Former   BSA 1.65 m²        Physical Exam:  On exam Ms. Polo appears her stated age, is alert and oriented x3, and in no acute distress. Her sclera are anicteric and her oropharynx has moist mucous membranes. Her neck is supple and without thyromegaly. The JVP is ~8 cm of water above the right atrium. Her cardiac exam has regular rhythm, normal S1, S2. No S3/4. There are no murmurs. Her lungs are clear to auscultation bilaterally and there is no dullness to percussion. Her abdomen is soft, nontender with normoactive bowel sounds. There is no HJR. The extremities are warm and with 1+ pitting edema. The skin is dry. There is no rash present. The distal pulses are 2+ in all four extremities. Her mood and affect are appropriate for todays encounter.       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     ECG (9/28/23):  Sinus rhythm (HR 70), no evidence of low voltage    Echo (9/8/23):  1. Left ventricular systolic function is normal with a 55-60% estimated ejection fraction.  2. The mean GLPS is 14.2%, which is diminished, with relatively normal strain values involving the apex. The myocardium has somewhat of a speckled appearance. These two findings raise the question of possible cardiac amyloidosis.  3. There is moderate to severe concentric left ventricular hypertrophy.  4. The left atrium is moderately dilated.  5. The " estimated RVSP is 19 mm.      Impression/Plan:  Ms. Polo is a 78F with a PMHx sig for RA, SVT, DM, h/o gastric bypass, h/o intraductal papillary mucinous neoplasm s/p whipple, hypothyroidism, and stage C diastolic HFpEF/restrictive CM secondary to cardiac amyloidosis who returns to the Advanced Heart Failure clinic for ongoing evaluation and management. At the current time she has functional class II symptoms and appears hypo/euvolemic.     1) Stage C chronic diastolic HF/HFpEF/restrictive CM secondary to hATTR (c.238A>G; [id number]) cardiac amyloidosis  Work up consistent with hATTR CA. Main issue now is symptomatic polyneuropathy, likely related to her amyloidosis.    -c/w vyndamax 61 mg daily  -f/u with genetics re: family testing  -f/u with neurology re: polyneuropathy    2) pSVT  Currently on beta blocker; may need to reconsider therapy in the future if BB starts to affect her CO.      F/U: 6 months at /[facility]    Mychal,  Thank you for referring Ms. Polo to the  Advanced Heart Failure Clinic. Please let me know if you have any questions.       ____________________________________________________________  Dejan Fortune DO  Section of Advanced Heart Failure and Cardiac Transplantation  Division of Cardiovascular Medicine  [facility]  [facility]

## 2024-02-02 DIAGNOSIS — I43 CARDIAC AMYLOIDOSIS (MULTI): ICD-10-CM

## 2024-02-02 DIAGNOSIS — E85.4 CARDIAC AMYLOIDOSIS (MULTI): ICD-10-CM

## 2024-02-08 ENCOUNTER — APPOINTMENT (OUTPATIENT)
Dept: HEMATOLOGY/ONCOLOGY | Facility: HOSPITAL | Age: 79
End: 2024-02-08
Payer: MEDICARE

## 2024-03-21 ENCOUNTER — APPOINTMENT (OUTPATIENT)
Dept: CARDIOLOGY | Facility: CLINIC | Age: 79
End: 2024-03-21
Payer: MEDICARE

## 2024-03-27 ENCOUNTER — OFFICE VISIT (OUTPATIENT)
Dept: NEUROLOGY | Facility: HOSPITAL | Age: 79
End: 2024-03-27
Payer: MEDICARE

## 2024-03-27 VITALS
HEART RATE: 74 BPM | WEIGHT: 124 LBS | SYSTOLIC BLOOD PRESSURE: 117 MMHG | RESPIRATION RATE: 18 BRPM | DIASTOLIC BLOOD PRESSURE: 76 MMHG | HEIGHT: 70 IN | TEMPERATURE: 97.7 F | BODY MASS INDEX: 17.75 KG/M2

## 2024-03-27 DIAGNOSIS — E85.1 FAMILIAL TRANSTHYRETIN AMYLOIDOSIS (MULTI): Primary | ICD-10-CM

## 2024-03-27 DIAGNOSIS — G63 FAMILIAL TRANSTHYRETIN AMYLOIDOSIS (MULTI): Primary | ICD-10-CM

## 2024-03-27 PROCEDURE — 99205 OFFICE O/P NEW HI 60 MIN: CPT | Performed by: PSYCHIATRY & NEUROLOGY

## 2024-03-27 PROCEDURE — 1126F AMNT PAIN NOTED NONE PRSNT: CPT | Performed by: PSYCHIATRY & NEUROLOGY

## 2024-03-27 PROCEDURE — 99215 OFFICE O/P EST HI 40 MIN: CPT | Mod: GC | Performed by: PSYCHIATRY & NEUROLOGY

## 2024-03-27 PROCEDURE — 3074F SYST BP LT 130 MM HG: CPT | Performed by: PSYCHIATRY & NEUROLOGY

## 2024-03-27 PROCEDURE — 1160F RVW MEDS BY RX/DR IN RCRD: CPT | Performed by: PSYCHIATRY & NEUROLOGY

## 2024-03-27 PROCEDURE — 3078F DIAST BP <80 MM HG: CPT | Performed by: PSYCHIATRY & NEUROLOGY

## 2024-03-27 PROCEDURE — 1159F MED LIST DOCD IN RCRD: CPT | Performed by: PSYCHIATRY & NEUROLOGY

## 2024-03-27 PROCEDURE — 1157F ADVNC CARE PLAN IN RCRD: CPT | Performed by: PSYCHIATRY & NEUROLOGY

## 2024-03-27 ASSESSMENT — PATIENT HEALTH QUESTIONNAIRE - PHQ9
SUM OF ALL RESPONSES TO PHQ9 QUESTIONS 1 AND 2: 0
2. FEELING DOWN, DEPRESSED OR HOPELESS: NOT AT ALL
1. LITTLE INTEREST OR PLEASURE IN DOING THINGS: NOT AT ALL

## 2024-03-27 ASSESSMENT — PAIN SCALES - GENERAL: PAINLEVEL: 0-NO PAIN

## 2024-03-27 NOTE — PROGRESS NOTES
Neuromuscular Medicine Consult     Adilene Polo, MRN: [id number], : 1945  Reason for Referral: Evaluation for ATTR Polyneuropathy   Primary Care Physician: Ebony Carmona MD     Impression/Plan:   Adilene Polo is presenting for concerns of ATTR neuropathy. She had 6 months of mostly symmetric upper and lower extremity sensory disturbance along with allodynia in the feet. She also has proximal and distal weakness slightly worse in the left hand and bilateral hip flexors. Reflexes is trace to absent. She has multiple other sings of ATTR including cardiac disease (CHF), bilateral carpal tunnel, right biceps tear, lightheadedness and syncope once, dry mouth and skin, and alternating diarrhea and constipation. She likely has family history of this, in her mother and brothers however they were not tested. Her daughter however has also tested positive for ATTR but is asymptomatic.     She has undergone workup with cardiology including [employer] panel showing a pathogenic heterozygous gene (c.238A>G; p.[id number]) and cardiac imaging confirming TTR deposition in the heart. Taker together she almost certainly has ATTR polyneuropathy in addition to her cardiac disease. We will do an EMG to confirm this. In addition her proximal weakness and prior biceps tear raises concern for amyloid myopathy, so will order CK.     She has started Tafamadis for cardiac ATTR, and we will plan to start her on an agent, either Vutisiran or Patisiran, for polyneuropathy after her EMG testing.     Plan:  Problem List Items Addressed This Visit       Familial transthyretin amyloidosis (CMS/HCC) - Primary    Relevant Orders    EMG & nerve conduction    Creatine Kinase    Vitamin A    Aldolase     Shai Cerda MD  Neuromuscular Fellow     ATTENDING NOTE - ELIESER DOE M.D.    I saw patient with trainee and agree with the edits, history and exam that I helped formulate per above.    She had heart palpitations and was found to have congestive  heart failure with cardiomyopathy and was ultimately diagnosed several months ago with ATTR amyloidosis with cardiomyopathy, genetically confirmed.  She was started on oral tafamidis about 3 months ago and has tolerated well with no side effects.  She had a history of carpal tunnel releases several years ago as well as right biceps tear and a single syncopal attack.    Her neuropathic symptoms started about 6 months ago with tingling in both feet and hands along with hyperesthesia and dry skin.  She also noted increasing weakness in the hands and feet as well as worsening balance.  She has had chronic muscle cramps.  She has had chronic diarrhea but this is complicated by a remote bariatric surgery and more recent Whipple procedure for lymphoma.  She has taken multivitamins.    Her family history is relevant for congestive heart failure late in life affecting her mother and brother.  Her daughter was recently genetically tested and was positive although still asymptomatic.  She has 3 sons that have not been tested.      Her neurological examination reveals a thin patient who was not in any apparent distress.  She has possibly slight atrophy in the hands but clearly weakness of hand muscles as well as ankle dorsiflexors and plantar flexors bilaterally.  She also has a slight weakness at the hip flexors bilaterally.  She is areflexic throughout except for +1 knee jerks bilaterally.  On sensory testing she has significant reduction in pinprick and touch sensation in both feet and hands with a distal to proximal pattern.  Vibration sense is absent at the toes and slightly reduced position senses.  She has allodynia of both feet.  Her gait is wide-based.    In summary, Mrs. Adilene Polo has a sensorimotor peripheral polyneuropathy with a genetically confirmed ATTR amyloidosis with cardiomyopathy.  We will obtain EMG testing to assess for peripheral polyneuropathy and possible myopathy, get CK and aldolase in view of her  proximal weakness and get baseline vitamin A in preparation for treatment for ATTR polyneuropathy.  Will also consider additional testing including for vitamin deficiencies as well as serum immunofixation.  We discussed this with the patient in detail.  We will see her after testing.      Raman Saavedra M.D., F.A.C.P.   Director, Neuromuscular Center & EMG laboratory   [facility]   [facility]   Professor of Neurology   [facility] [employer]    The total appointment time today was 60 minutes. Time included preparing to see the patient, obtaining the history, performing a medically necessary appropriate physical examination, counseling and educating the patient, ordering tests, referring and communicating with other providers, independently interpreting results  to the patient and documenting clinical information in the medical record.      History of Present Illness:    Ms. Polo is a 78 y.o. right handed female who presents for evaluation of ATTR polyneuropathy. She is here by herself.     She developed pain shooting down the legs into the toes 6 months ago. She has tingling in both feet and the hands. There is sensitivity in her entire body and feet to the textures (normal objects are uncomfortable to touch), she also has dry skin, and an unusual sensation when touching objects. The left arm may be slightly worse.     She has weakness in the hands and legs. Walking is slightly unbalanced. Trouble going up stairs. She gets tired with long distance walking. She gets bad muscle cramps and pain.     She was evaluated by cardiology initially for palpitations starting a few years ago, then evolving to symptoms of CHF. Genetic testing with finding of a pathogenic heterozygous gene (c.238A>G; p.[id number]) which has been associated with both cardiac amyloidosis and polyneuropathy. Started Tafamadis 3 months ago, with no side effects.     Review  of ATTR Symptoms:  CTR bilateral years ago. The surgeries helped.   Biceps tear 1-2 years ago.   Muscle pain and cramps in the legs.   Lightheadedness a lot, syncope once.  She has dry mouth and skin. Not skin.  GI - diarrhea and constipation alternating. Rare abdominal pain with bloating after meals. Early satiety. She is eating small meals, but no significant weight loss.     Past Medical History: RA, SVT, DMII, h/o gastric bypass, follicular lymphoma found during biopsy for intraductal papillary mucinous neoplasm s/p whipple , hypothyroidism, and stage C diastolic HFpEF/restrictive CM secondary to cardiac amyloidosis    Family History:  Mother had CHF at 70s, some neuropathy.  at 80.   Brother had CHF  at 76  Brother  of ESRD at 66   Sister  at 61 with down syndrome congenital heart disease   Daughter diagnosed - asymptomatic   3 sons getting tested - asymptomatic     Relevant past medical, surgical, family, and social histories, along with ROS was reviewed and pertinent details noted above.     Past Medical History:   Diagnosis Date    Biceps tendinopathy 2023    History of carpal tunnel surgery of left wrist 10/10/2019    History of GI bleed     History of malignant melanoma 2018    Hoarseness 2023    She had evaluation by ENT and there is mild vocal cord dysfunction but no mass.    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     Past Surgical History:   Procedure Laterality Date     CHOLECYSTECTOMY  10/28/2014    Cholecystectomy    GASTRIC BYPASS  10/28/2014    Gastric Surgery For Morbid Obesity Bypass With Ed-en-Y    INCISIONAL BREAST BIOPSY  2018    Incisional Breast Biopsy    THYROIDECTOMY, PARTIAL      WHIPPLE PROCEDURE W/ LAPAROSCOPY       Family History   Problem Relation Name Age of Onset    Heart disease Mother          Coronary    Heart disease Father          Coronary    Heart attack Father      Prostate cancer Father      Breast cancer Sister      Kidney disease Brother      Lung cancer Brother      Prostate cancer Brother      Pulmonary embolism Brother       Social History     Tobacco Use    Smoking status: Former     Types: Cigarettes     Quit date:      Years since quittin.2    Smokeless tobacco: Never   Substance Use Topics    Alcohol use: Never      Allergies   Allergen Reactions    Cephalosporins Anaphylaxis    Penicillins Anaphylaxis    Ace Inhibitors Cough    Atorvastatin Unknown     Elevated LFT    Iodinated Contrast Media Unknown        Medications:    Current Outpatient Medications:     b complex 0.4 mg tablet, Take 1 tablet by mouth once daily., Disp: , Rfl:     blood sugar diagnostic (Accu-Chek SmartView Test Strip) strip, USE 1 STRIP TWICE DAILY., Disp: , Rfl:     blood-glucose meter misc, Check sugars daily, Disp: 1 each, Rfl: 0    CALCIUM ORAL, Take 1 tablet by mouth 1 (one) time each day., Disp: , Rfl:     cyanocobalamin (Vitamin B-12) 1,000 mcg/mL injection, Inject 1 mL (1,000 mcg) into the muscle every 30 (thirty) days., Disp: 1 mL, Rfl: 11    denosumab (Prolia) 60 mg/mL syringe, Inject 1 mL (60 mg) under the skin every 6 months., Disp: , Rfl:     docusate sodium (STOOL SOFTENER ORAL), Take 1 capsule by mouth 1 (one) time each day., Disp: , Rfl:     levothyroxine (Synthroid, Levoxyl) 100 mcg tablet, Take 1 tablet (100 mcg) by mouth once daily in the morning. Take before meals., Disp: 90 tablet, Rfl: 1    magnesium oxide (Mag-Ox) 250 mg magnesium  "tablet, Take 1 tablet (250 mg) by mouth 2 times a day., Disp: , Rfl:     metoprolol succinate XL (Toprol-XL) 25 mg 24 hr tablet, TAKE 1 TABLET BY MOUTH EVERY DAY, Disp: 90 tablet, Rfl: 3    NON FORMULARY, Vitamin D 50 MCG (2000 UT) Oral Tablet; Take 1 tablet daily, Disp: , Rfl:     sulfaSALAzine (Azulfidine) 500 mg tablet, Take 2 tablets (1,000 mg) by mouth 2 times a day. 1,000 mg in the morning and 1500mg at night, Disp: , Rfl:     tafamidis (Vyndamax) 61 mg capsule, Take 1 capsule (61 mg) by mouth once daily., Disp: 90 capsule, Rfl: 3    WHEAT DEXTRIN ORAL, Benefiber Oral Powder; Use as directed, Disp: , Rfl:     zinc gluconate 50 mg tablet, Take 1 tablet (50 mg) by mouth once daily., Disp: , Rfl:     pancrelipase, Lip-Prot-Amyl, (Creon) 36,000-114,000- 180,000 unit capsule,delayed release(DR/EC) capsule, Take 4 capsules by mouth once daily. (Patient not taking: Reported on 12/21/2023), Disp: 360 capsule, Rfl: 0       Physical Exam:   /76   Pulse 74   Temp 36.5 °C (97.7 °F)   Resp 18   Ht 1.765 m (5' 9.5\")   Wt 56.2 kg (124 lb)   BMI 18.05 kg/m²      General Appearance:  No distress, alert, interactive and cooperative.   Skin: No rash present on limbs  Musculoskeletal:  No pes cavus, or hammertoes  Neurological:  Mental status: the patient provided an accurate history, language was normal.   Cranial Nerves:  CN 3, 4, 6   Lids symmetric; no ptosis.   EOMs normal alignment, full range  No nystagmus.   CN 5   Facial sensation intact.   CN 7   Normal and symmetric facial strength. Nasolabial folds symmetric.   CN 8   Hearing intact to conversation.     CN 9, 10   Phonation within normal limits, no dysarthria.   CN 12   Tongue midline.     Motor:   Muscle bulk: diffuse atrophy throughout. Markedly atrophied thenar eminence bilateral.   Muscle tone: Normal in both upper and lower extremities.  Movements: No fasciculations, tremors, or other abnormal movement.   Manual Muscle Testing (MMT) reveals the " following MRC grades:    R L   Shoulder abduction  5- 5-  Elbow flexion   5 5  Elbow extension  5 4  Wrist flexion   5 5  Wrist extension  5 5  Finger flexion   4 4  Finger extension  4 4  Finger abduction  4 4  Thumb abduction   2 2  Hip flexion   4 4  Hip extension   5 5-  Knee flexion   5 5-  Knee extension  5 5  Ankle dorsiflexion  5 5  Ankle plantarflexion  5- 4  Ankle Inversion   5 5  Ankle Eversion   5 5  Big toe extension  4 4  Toe flexion   4 4    Reflexes:     R          L  BR:               0          0  Biceps:         0          0  Triceps:        0          0  Knee:           1          1  Ankle:          0          0    Babinski: Toes are down going  No clonus or other pathological reflexes    Sensory:   Reduced sensation to PP and LT throughout the bilateral upper and lower extremities worse on the left, without nerve/dermatomal pattern.  Bilateral lower extremities very severe loss of sensation with absent ankle vibration but normal position sense.   Allodynia in bilateral feet.     Gait:   Station was stable with a normal base, short careful steps.   No Romberg sign, but a lot of sway.      Results:     The following labs, imaging, and/or data were personally reviewed and demonstrated:    TTR positive pathogenic heterozygous gene (c.238A>G; p.[id number]) from [employer].     NM Heart infarction SPECT 10/12/23:  IMPRESSION:  Amyloid planar and SPECT heart study is suggestive of TTR amyloidosis.    Echo 9/8/2023:  1. Left ventricular systolic function is normal with a 55-60% estimated ejection fraction.  2. The mean GLPS is 14.2%, which is diminished, with relatively normal strain values involving the apex. The myocardium has somewhat of a speckled appearance. These two findings raise the question of possible cardiac amyloidosis.  3. There is moderate to severe concentric left ventricular hypertrophy.  4. The left atrium is moderately dilated.  5. The estimated RVSP is 19 mm.    LABS:         Component  Ref Range & Units 6 mo ago   BNP  0 - 99 pg/mL 139 High         Component  Ref Range & Units 4 mo ago   Hemoglobin A1C  see below % 5.0     Methylmalonic Acid, S  0.00 - 0.40 umol/L 0.33     Component  Ref Range & Units 4 mo ago   Ig [place] Free Light Chain  0.33 - 1.94 mg/dL 2.92 High    Ig Lambda Free Light Chain  0.57 - 2.63 mg/dL 2.32   Kappa/Lambda Ratio  0.26 - 1.65 1.26     Serum Protein Electrophoresis      Component  Ref Range & Units 5 mo ago   Albumin  3.4 - 5.0 g/dL 4.0   Alpha 1 Globulin  0.2 - 0.6 g/dL 0.3   Alpha 2 Globulin  0.4 - 1.1 g/dL 0.6   Beta Globulin  0.5 - 1.2 g/dL 0.7   Gamma  0.5 - 1.4 g/dL 0.8   Protein Electrophoresis Comment Normal.          Urine Protein Electrophoresis  Order: [id number] - Part of Panel Order [id number]       Component  Ref Range & Units 4 mo ago 5 mo ago   Albumin %  % 56.0 51.8   Alpha 1 Globulin %  % 6.3 8.0   Alpha 2 Globulin %  % 6.8 6.6   Beta Globulin %  % 15.7 16.3   Gamma Globulin %  % 15.2 17.3   Urine Electrophoresis Comment Normal. Normal   Path Review-Urine Protein Electrophoresis Reviewed and approved by BABAK SALINAS on 11/8/23 at 8:59 AM.   Reviewed and approved by JOHAN ESCUDERO on 10/17/23 at 11:05 AM.

## 2024-03-27 NOTE — PATIENT INSTRUCTIONS
Get your blood drawn.     You will be called to schedule your EMG, if not Call [phone] to schedule your test.

## 2024-04-01 ENCOUNTER — LAB (OUTPATIENT)
Dept: LAB | Facility: LAB | Age: 79
End: 2024-04-01
Payer: MEDICARE

## 2024-04-01 DIAGNOSIS — E85.1 FAMILIAL TRANSTHYRETIN AMYLOIDOSIS (MULTI): ICD-10-CM

## 2024-04-01 DIAGNOSIS — G63 FAMILIAL TRANSTHYRETIN AMYLOIDOSIS (MULTI): ICD-10-CM

## 2024-04-01 LAB — CK SERPL-CCNC: 110 U/L (ref 0–215)

## 2024-04-01 PROCEDURE — 84590 ASSAY OF VITAMIN A: CPT

## 2024-04-01 PROCEDURE — 36415 COLL VENOUS BLD VENIPUNCTURE: CPT

## 2024-04-01 PROCEDURE — 82550 ASSAY OF CK (CPK): CPT

## 2024-04-01 PROCEDURE — 82085 ASSAY OF ALDOLASE: CPT

## 2024-04-03 LAB — ALDOLASE SERPL-CCNC: 3 U/L (ref 1.2–7.6)

## 2024-04-04 LAB
ANNOTATION COMMENT IMP: ABNORMAL
RETINYL PALMITATE SERPL-MCNC: <0.02 MG/L (ref 0–0.1)
VIT A SERPL-MCNC: 0.21 MG/L (ref 0.3–1.2)

## 2024-04-09 ASSESSMENT — ENCOUNTER SYMPTOMS
FATIGUE: 1
GASTROINTESTINAL NEGATIVE: 1
RESPIRATORY NEGATIVE: 1
EYES NEGATIVE: 1
NEUROLOGICAL NEGATIVE: 1
MYALGIAS: 1
PSYCHIATRIC NEGATIVE: 1
HEMATOLOGIC/LYMPHATIC NEGATIVE: 1

## 2024-04-09 NOTE — PROGRESS NOTES
Subjective   Patient ID: Adilene Polo is a 78 y.o. female who presents for FUV    HPI   Very pleasant 78 year old female with hx of lymphoma presents here for bilateral lower extremity edema and evaluation of venous insufficiency. Patient wears compression stockings regularly and reports significant improvement in leg swelling.   Denies any spontaneous ulceration or bleeding. She gets nocturnal cramps especially when she stretches her legs. She complains of leg fatigue and severe heaviness and symptoms worsening at the end of the day. Pt notes her sx have largely affected her quality of life and daily activities. Compression treatment has helped.   Denies any personal hx or Fhx of DVTs in the leg.   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   Pt has hx of lymphoma( cannot palpate any pelvic lymph nodes) , CT scan recently showed enlarged periaortic retroperitoneal lymph nodes more on the left side. She has seen Dr Pandey at oncology who confirmed presence of non-Hodgkin's lymphoma and slight enlargement of pelvic/groin lymph nodes. With low burden of lymphoma and lack of constitutional sx, oncology has decided against any treatment at this point.     Venous insufficiency study done on 9/9/23:   Right Lower Venous Insufficiency: Right leg demonstrates no evidence of deep vein thrombosis or deep or superficial venous insufficiency. A cystic structure is noted in the right popliteal fossa.  Left Lower Venous Insufficiency: Reflux is noted in the proximal calf great saphenous and mid calf great saphenous veins.  Left Lower Venous: No evidence of acute deep vein thrombus visualized in the left lower extremity.    Review of Systems   Constitutional:  Positive for fatigue.   HENT: Negative.     Eyes: Negative.    Respiratory: Negative.     Cardiovascular:  Positive for leg swelling.   Gastrointestinal: Negative.    Musculoskeletal:  Positive for myalgias.   Skin: Negative.    Neurological: Negative.    Hematological:  "Negative.    Psychiatric/Behavioral: Negative.     All other systems reviewed and are negative.      Objective   /70 (BP Location: Left arm, Patient Position: Sitting)   Pulse 71   Ht 1.753 m (5' 9\")   Wt 56.2 kg (124 lb)   SpO2 96%   BMI 18.31 kg/m²     Physical Exam  Vitals reviewed.   Constitutional:       Appearance: Normal appearance.   HENT:      Head: Normocephalic and atraumatic.   Cardiovascular:      Rate and Rhythm: Normal rate and regular rhythm.      Pulses: Normal pulses.      Heart sounds: Normal heart sounds.   Pulmonary:      Effort: Pulmonary effort is normal.      Breath sounds: Normal breath sounds.   Musculoskeletal:      Cervical back: Normal range of motion.      Right lower leg: Edema present.      Left lower leg: Edema present.   Neurological:      General: No focal deficit present.      Mental Status: She is alert and oriented to person, place, and time.   Psychiatric:         Mood and Affect: Mood normal.         Behavior: Behavior normal.         Assessment/Plan   Very pleasant 78 year old female with bilateral LE edema more pronounced on the left lower extremity and venous insufficiency, left worse than right. She has been wearing compression stockings. Pt has hx of lymphoma( cannot palpate any pelvic lymph nodes) , non-Hodgkin's lymphoma and slight enlargement of pelvic/groin lymph nodes. Compression treatment has significantly improved her symptoms. The circumference of the legs is much smaller, lefts are softer and smaller.     1- Patient is advised to adhere to compression Velcro garments especially when sitting or standing for prolonged hours.  (Patient has evidence of left GSV reflux at the proximal and mid calf, patient is symptomatically with leg swelling and definitely it is affecting her quality of life and daily activities) She will not need any vascular intervention at this time because her swelling has improved.   2-  Follow up in 6 months for reassessment. "     Scribe Attestation  By signing my name below, I, Prema Barnard , Scribe   attest that this documentation has been prepared under the direction and in the presence of Rene Romero MD.

## 2024-04-10 ENCOUNTER — OFFICE VISIT (OUTPATIENT)
Dept: CARDIOLOGY | Facility: CLINIC | Age: 79
End: 2024-04-10
Payer: MEDICARE

## 2024-04-10 VITALS
SYSTOLIC BLOOD PRESSURE: 110 MMHG | HEIGHT: 69 IN | DIASTOLIC BLOOD PRESSURE: 70 MMHG | WEIGHT: 124 LBS | OXYGEN SATURATION: 96 % | HEART RATE: 71 BPM | BODY MASS INDEX: 18.37 KG/M2

## 2024-04-10 DIAGNOSIS — I87.2 VENOUS INSUFFICIENCY: Primary | ICD-10-CM

## 2024-04-10 PROCEDURE — 3078F DIAST BP <80 MM HG: CPT | Performed by: INTERNAL MEDICINE

## 2024-04-10 PROCEDURE — 1036F TOBACCO NON-USER: CPT | Performed by: INTERNAL MEDICINE

## 2024-04-10 PROCEDURE — 1160F RVW MEDS BY RX/DR IN RCRD: CPT | Performed by: INTERNAL MEDICINE

## 2024-04-10 PROCEDURE — 99213 OFFICE O/P EST LOW 20 MIN: CPT | Performed by: INTERNAL MEDICINE

## 2024-04-10 PROCEDURE — 3074F SYST BP LT 130 MM HG: CPT | Performed by: INTERNAL MEDICINE

## 2024-04-10 PROCEDURE — 1159F MED LIST DOCD IN RCRD: CPT | Performed by: INTERNAL MEDICINE

## 2024-04-10 PROCEDURE — 1157F ADVNC CARE PLAN IN RCRD: CPT | Performed by: INTERNAL MEDICINE

## 2024-04-11 ENCOUNTER — APPOINTMENT (OUTPATIENT)
Dept: CARDIOLOGY | Facility: CLINIC | Age: 79
End: 2024-04-11
Payer: MEDICARE

## 2024-04-16 ENCOUNTER — TELEPHONE (OUTPATIENT)
Dept: HEMATOLOGY/ONCOLOGY | Facility: CLINIC | Age: 79
End: 2024-04-16
Payer: MEDICARE

## 2024-04-17 DIAGNOSIS — D64.9 ANEMIA, UNSPECIFIED TYPE: Primary | ICD-10-CM

## 2024-04-17 NOTE — TELEPHONE ENCOUNTER
Spoke with the patient and provided update from Dr. Pandey. States she cannot get blood work drawn until Wednesday of next week because she needs to get a ride. Set up FUV on May 10th to accommodate patient needing a ride and timing of lab work results. Pt was agreeable and had no further questions or concerns at this time.

## 2024-04-17 NOTE — TELEPHONE ENCOUNTER
"Spoke with the patient. States she doesn't see Dr. Pandey until November. Asking if she needs to see Dr. Pandey sooner because she is feeling fatigued and tired. Denies night sweats, headaches, and swollen lymph nodes. States she had lab work drawn in April at [facility] [place] and wants Dr. Pandey to review. Pt noted her HGB \"seems to be dropping.\" Aware I will call her back once I receive a response from Dr. Pandey. States it is OK to leave a message if patient doesn't answer phone.   "

## 2024-04-25 ENCOUNTER — HOSPITAL ENCOUNTER (OUTPATIENT)
Dept: NEUROLOGY | Facility: CLINIC | Age: 79
Discharge: HOME | End: 2024-04-25
Payer: MEDICARE

## 2024-04-25 ENCOUNTER — OFFICE VISIT (OUTPATIENT)
Dept: NEUROLOGY | Facility: CLINIC | Age: 79
End: 2024-04-25
Payer: MEDICARE

## 2024-04-25 ENCOUNTER — LAB (OUTPATIENT)
Dept: LAB | Facility: LAB | Age: 79
End: 2024-04-25
Payer: MEDICARE

## 2024-04-25 VITALS
SYSTOLIC BLOOD PRESSURE: 119 MMHG | HEART RATE: 71 BPM | RESPIRATION RATE: 18 BRPM | HEIGHT: 70 IN | DIASTOLIC BLOOD PRESSURE: 68 MMHG | BODY MASS INDEX: 17.75 KG/M2 | WEIGHT: 124 LBS

## 2024-04-25 DIAGNOSIS — G63 FAMILIAL TRANSTHYRETIN AMYLOIDOSIS (MULTI): ICD-10-CM

## 2024-04-25 DIAGNOSIS — G63 FAMILIAL TRANSTHYRETIN AMYLOIDOSIS (MULTI): Primary | ICD-10-CM

## 2024-04-25 DIAGNOSIS — E85.1 FAMILIAL TRANSTHYRETIN AMYLOIDOSIS (MULTI): ICD-10-CM

## 2024-04-25 DIAGNOSIS — D64.9 ANEMIA, UNSPECIFIED TYPE: ICD-10-CM

## 2024-04-25 DIAGNOSIS — E85.1 FAMILIAL TRANSTHYRETIN AMYLOIDOSIS (MULTI): Primary | ICD-10-CM

## 2024-04-25 DIAGNOSIS — G60.8 POLYNEUROPATHY, PERIPHERAL SENSORIMOTOR AXONAL: ICD-10-CM

## 2024-04-25 LAB
BASOPHILS # BLD AUTO: 0.03 X10*3/UL (ref 0–0.1)
BASOPHILS NFR BLD AUTO: 0.8 %
DAT-POLYSPECIFIC: NORMAL
EOSINOPHIL # BLD AUTO: 0.01 X10*3/UL (ref 0–0.4)
EOSINOPHIL NFR BLD AUTO: 0.3 %
ERYTHROCYTE [DISTWIDTH] IN BLOOD BY AUTOMATED COUNT: 13.8 % (ref 11.5–14.5)
FERRITIN SERPL-MCNC: 354 NG/ML (ref 8–150)
HCT VFR BLD AUTO: 31.2 % (ref 36–46)
HGB BLD-MCNC: 10 G/DL (ref 12–16)
HGB RETIC QN: 37 PG (ref 28–38)
IMM GRANULOCYTES # BLD AUTO: 0.01 X10*3/UL (ref 0–0.5)
IMM GRANULOCYTES NFR BLD AUTO: 0.3 % (ref 0–0.9)
IMMATURE RETIC FRACTION: 5 %
IRON SATN MFR SERPL: 40 % (ref 25–45)
IRON SERPL-MCNC: 126 UG/DL (ref 35–150)
LDH SERPL L TO P-CCNC: 210 U/L (ref 84–246)
LYMPHOCYTES # BLD AUTO: 1.28 X10*3/UL (ref 0.8–3)
LYMPHOCYTES NFR BLD AUTO: 35.5 %
MCH RBC QN AUTO: 34.5 PG (ref 26–34)
MCHC RBC AUTO-ENTMCNC: 32.1 G/DL (ref 32–36)
MCV RBC AUTO: 108 FL (ref 80–100)
MONOCYTES # BLD AUTO: 0.24 X10*3/UL (ref 0.05–0.8)
MONOCYTES NFR BLD AUTO: 6.6 %
NEUTROPHILS # BLD AUTO: 2.04 X10*3/UL (ref 1.6–5.5)
NEUTROPHILS NFR BLD AUTO: 56.5 %
NRBC BLD-RTO: 0 /100 WBCS (ref 0–0)
PLATELET # BLD AUTO: 237 X10*3/UL (ref 150–450)
PROT SERPL-MCNC: 6.4 G/DL (ref 6.4–8.2)
RBC # BLD AUTO: 2.9 X10*6/UL (ref 4–5.2)
RETICS #: 0.04 X10*6/UL (ref 0.02–0.11)
RETICS/RBC NFR AUTO: 1.4 % (ref 0.5–2)
TIBC SERPL-MCNC: 317 UG/DL (ref 240–445)
UIBC SERPL-MCNC: 191 UG/DL (ref 110–370)
WBC # BLD AUTO: 3.6 X10*3/UL (ref 4.4–11.3)

## 2024-04-25 PROCEDURE — 95911 NRV CNDJ TEST 9-10 STUDIES: CPT | Performed by: PSYCHIATRY & NEUROLOGY

## 2024-04-25 PROCEDURE — 84165 PROTEIN E-PHORESIS SERUM: CPT

## 2024-04-25 PROCEDURE — 1160F RVW MEDS BY RX/DR IN RCRD: CPT | Performed by: PSYCHIATRY & NEUROLOGY

## 2024-04-25 PROCEDURE — 95886 MUSC TEST DONE W/N TEST COMP: CPT | Performed by: PSYCHIATRY & NEUROLOGY

## 2024-04-25 PROCEDURE — 83550 IRON BINDING TEST: CPT

## 2024-04-25 PROCEDURE — 83521 IG LIGHT CHAINS FREE EACH: CPT

## 2024-04-25 PROCEDURE — 85045 AUTOMATED RETICULOCYTE COUNT: CPT

## 2024-04-25 PROCEDURE — 1159F MED LIST DOCD IN RCRD: CPT | Performed by: PSYCHIATRY & NEUROLOGY

## 2024-04-25 PROCEDURE — 83010 ASSAY OF HAPTOGLOBIN QUANT: CPT

## 2024-04-25 PROCEDURE — 84165 PROTEIN E-PHORESIS SERUM: CPT | Performed by: INTERNAL MEDICINE

## 2024-04-25 PROCEDURE — 82728 ASSAY OF FERRITIN: CPT

## 2024-04-25 PROCEDURE — 1157F ADVNC CARE PLAN IN RCRD: CPT | Performed by: PSYCHIATRY & NEUROLOGY

## 2024-04-25 PROCEDURE — 3078F DIAST BP <80 MM HG: CPT | Performed by: PSYCHIATRY & NEUROLOGY

## 2024-04-25 PROCEDURE — 36415 COLL VENOUS BLD VENIPUNCTURE: CPT

## 2024-04-25 PROCEDURE — 1126F AMNT PAIN NOTED NONE PRSNT: CPT | Performed by: PSYCHIATRY & NEUROLOGY

## 2024-04-25 PROCEDURE — 84155 ASSAY OF PROTEIN SERUM: CPT

## 2024-04-25 PROCEDURE — 83540 ASSAY OF IRON: CPT

## 2024-04-25 PROCEDURE — 85025 COMPLETE CBC W/AUTO DIFF WBC: CPT

## 2024-04-25 PROCEDURE — 83615 LACTATE (LD) (LDH) ENZYME: CPT

## 2024-04-25 PROCEDURE — 3074F SYST BP LT 130 MM HG: CPT | Performed by: PSYCHIATRY & NEUROLOGY

## 2024-04-25 PROCEDURE — 99215 OFFICE O/P EST HI 40 MIN: CPT | Mod: GC | Performed by: PSYCHIATRY & NEUROLOGY

## 2024-04-25 PROCEDURE — 95886 MUSC TEST DONE W/N TEST COMP: CPT | Mod: GC | Performed by: PSYCHIATRY & NEUROLOGY

## 2024-04-25 PROCEDURE — 99215 OFFICE O/P EST HI 40 MIN: CPT | Performed by: PSYCHIATRY & NEUROLOGY

## 2024-04-25 PROCEDURE — 95911 NRV CNDJ TEST 9-10 STUDIES: CPT | Mod: GC | Performed by: PSYCHIATRY & NEUROLOGY

## 2024-04-25 PROCEDURE — 86880 COOMBS TEST DIRECT: CPT

## 2024-04-25 ASSESSMENT — PAIN SCALES - GENERAL: PAINLEVEL: 0-NO PAIN

## 2024-04-25 NOTE — PROGRESS NOTES
Neuromuscular Medicine Follow Up     Adilene Polo, MRN: [id number], : 1945  Reason for Visit: ATTR Polyneuropathy   Primary Care Physician: Ebony Carmona MD     Impression/Plan:   Adilene Polo is presenting for concerns of ATTR neuropathy. She had 6 months of mostly symmetric upper and lower extremity sensory disturbance along with allodynia in the feet. She also has proximal and distal weakness slightly worse in the left hand and bilateral hip flexors. Reflexes is trace to absent. She has multiple other sings of ATTR including cardiac disease (CHF), bilateral carpal tunnel, right biceps tear, lightheadedness and syncope once, dry mouth and skin, and alternating diarrhea and constipation. She likely has family history of this, in her mother and brothers however they were not tested. Her daughter however has also tested positive for ATTR but is asymptomatic.     She has undergone workup with cardiology including [employer] panel showing a pathogenic heterozygous gene (c.238A>G; p.[id number]) and cardiac imaging confirming TTR deposition in the heart. Taker together she almost certainly has ATTR polyneuropathy in addition to her cardiac disease.  Her CK and aldolase is negative, and EMG confirms evidence of a generalized severe nonlength-dependent axonal polyneuropathy.     Plan:  Problem List Items Addressed This Visit       Familial transthyretin amyloidosis (Multi) - Primary    Relevant Medications    vitamins A and D capsule    Polyneuropathy, peripheral sensorimotor axonal   She has started Tafamadis for cardiac ATTR, and we will plan to start her on an agent, specifically Vutisiran, or Patisiran as a second option.    Shai Cerda MD  Neuromuscular Fellow     ATTENDING NOTE - ELIESER DOE M.D.    I saw patient with trainee and agree with the edits, history and exam that I helped formulate per above.    She returns today with no change of symptoms.  To recap, her neuropathic symptoms started about 6 months ago  with tingling in both feet and hands along with hyperesthesia and dry skin.  She also noted increasing weakness in the hands and feet as well as worsening balance.  She has had chronic muscle cramps. She was diagnosed with  cardiomyopathy due ATTR amyloidosis, genetically confirmed.  She was started on oral tafamidis about 3 months ago and has tolerated well with no side effects.  She had a history of carpal tunnel releases several years ago as well as right biceps tear and a single syncopal attack. Her family history is relevant for congestive heart failure late in life affecting her mother and brother.  Her daughter was recently genetically tested and was positive although still asymptomatic.  She has 3 sons that have not been tested.      Her neurological examination reveals atrophy in the hands with weakness of hand muscles as well as ankle dorsiflexors and plantar flexors bilaterally.  She is areflexic throughout except for +1 knee jerks bilaterally.  On sensory testing she has significant reduction in pinprick and touch sensation in both feet and hands with a distal to proximal pattern.  Vibration sense is absent at the toes and slightly reduced position senses.  She has allodynia of both feet.  Her gait is wide-based.    Her EMG study today showed severe generalized, chronic, axonal, non-length-dependent sensorimotor peripheral polyneuropathy with superimposed radicular features with minimal active denervation. CK and aldolase were normal. Serum immunofixation was normal but Kappa was elevated.  Baseline vitamin A was slightly reduced at 0.21.    In summary, Mrs. Adilene Polo has a sensorimotor peripheral polyneuropathy and cardiomyopathy due to genetically confirmed ATTR amyloidosis.       She was started 3 months ago Tafamadis for cardiac ATTR, and we will plan to start her on subcutaneous Vutisiran (or intravenous Patisiran if demanded by insurance company) and vitamin A supplementation. We discussed this  with her. We will see her in follow up in 2-3 months.        Raman Saavedra M.D., F.A.C.P.   Director, Neuromuscular Center & EMG laboratory   [facility]   [facility]   Professor of Neurology   [facility] [employer]    The total appointment time today was 40 minutes. Time included preparing to see the patient, obtaining the history, performing a medically necessary appropriate physical examination, counseling and educating the patient, ordering medications, referring and communicating with other providers, independently interpreting results  to the patient and documenting clinical information in the medical record.      History of Present Illness:    Ms. Polo is a 78 y.o. right handed female who presents for follow-up for ATTR polyneuropathy. She is here by herself.     Since last visit, her symptoms continue to gradually worsen.  There is no significant change in quality.  She continues on tafamidis without side effects.    To Recap, she developed pain shooting down the legs into the toes 6 months ago. She has tingling and weakness in both feet and the hands. There is sensitivity in her entire body and feet.  to the textures (normal objects are uncomfortable to touch), she also has dry skin, and an unusual sensation when touching objects. The left arm may be slightly worse. She was evaluated by cardiology initially for palpitations starting a few years ago, then evolving to symptoms of CHF. Genetic testing with finding of a pathogenic heterozygous gene (c.238A>G; p.[id number]) which has been associated with both cardiac amyloidosis and polyneuropathy. Started Tafamadis 3 months ago, with no side effects.     Review of ATTR Symptoms:  CTR bilateral years ago. The surgeries helped.   Biceps tear 1-2 years ago.   Muscle pain and cramps in the legs.   Lightheadedness a lot, syncope once.  She has dry mouth and skin. Not skin.  GI - diarrhea  and constipation alternating. Rare abdominal pain with bloating after meals. Early satiety. She is eating small meals, but no significant weight loss.     Past Medical History: RA, SVT, DMII, h/o gastric bypass, follicular lymphoma found during biopsy for intraductal papillary mucinous neoplasm s/p whipple 2020, hypothyroidism, and stage C diastolic HFpEF/restrictive CM secondary to cardiac amyloidosis    Family History:  Mother had CHF at 70s, some neuropathy.  at 80.   Brother had CHF  at 76  Brother  of ESRD at 66   Sister  at 61 with down syndrome congenital heart disease   Daughter diagnosed - asymptomatic   3 sons getting tested - asymptomatic     Relevant past medical, surgical, family, and social histories, along with ROS was reviewed and pertinent details noted above.     Past Medical History:   Diagnosis Date    Biceps tendinopathy 2023    History of carpal tunnel surgery of left wrist 10/10/2019    History of GI bleed     History of malignant melanoma 2018    Hoarseness 2023    She had evaluation by ENT and there is mild vocal cord dysfunction but no mass.    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     Past Surgical History:   Procedure Laterality Date    CHOLECYSTECTOMY  10/28/2014    Cholecystectomy    GASTRIC BYPASS  10/28/2014    Gastric Surgery For Morbid Obesity Bypass With Ed-en-Y    INCISIONAL BREAST BIOPSY  2018    Incisional Breast Biopsy    THYROIDECTOMY, PARTIAL       WHIPPLE PROCEDURE W/ LAPAROSCOPY       Family History   Problem Relation Name Age of Onset    Heart disease Mother          Coronary    Heart disease Father          Coronary    Heart attack Father      Prostate cancer Father      Breast cancer Sister      Kidney disease Brother      Lung cancer Brother      Prostate cancer Brother      Pulmonary embolism Brother       Social History     Tobacco Use    Smoking status: Former     Current packs/day: 0.00     Types: Cigarettes     Quit date:      Years since quittin.3    Smokeless tobacco: Never   Substance Use Topics    Alcohol use: Never      Allergies   Allergen Reactions    Cephalosporins Anaphylaxis    Penicillins Anaphylaxis    Ace Inhibitors Cough    Atorvastatin Unknown     Elevated LFT    Iodinated Contrast Media Unknown        Medications:    Current Outpatient Medications:     b complex 0.4 mg tablet, Take 1 tablet by mouth once daily., Disp: , Rfl:     blood sugar diagnostic (Accu-Chek SmartView Test Strip) strip, USE 1 STRIP TWICE DAILY., Disp: , Rfl:     blood-glucose meter misc, Check sugars daily, Disp: 1 each, Rfl: 0    CALCIUM ORAL, Take 1 tablet by mouth 1 (one) time each day., Disp: , Rfl:     cyanocobalamin (Vitamin B-12) 1,000 mcg/mL injection, Inject 1 mL (1,000 mcg) into the muscle every 30 (thirty) days., Disp: 1 mL, Rfl: 11    denosumab (Prolia) 60 mg/mL syringe, Inject 1 mL (60 mg) under the skin every 6 months., Disp: , Rfl:     docusate sodium (STOOL SOFTENER ORAL), Take 1 capsule by mouth 1 (one) time each day., Disp: , Rfl:     levothyroxine (Synthroid, Levoxyl) 100 mcg tablet, Take 1 tablet (100 mcg) by mouth once daily in the morning. Take before meals., Disp: 90 tablet, Rfl: 1    magnesium oxide (Mag-Ox) 250 mg magnesium tablet, Take 1 tablet (250 mg) by mouth 2 times a day., Disp: , Rfl:     metoprolol succinate XL (Toprol-XL) 25 mg 24 hr tablet, TAKE 1 TABLET BY MOUTH EVERY DAY, Disp: 90 tablet, Rfl: 3    NON FORMULARY,  "Vitamin D 50 MCG (2000 UT) Oral Tablet; Take 1 tablet daily, Disp: , Rfl:     sulfaSALAzine (Azulfidine) 500 mg tablet, Take 2 tablets (1,000 mg) by mouth 2 times a day. 1,000 mg in the morning and 1500mg at night, Disp: , Rfl:     tafamidis (Vyndamax) 61 mg capsule, Take 1 capsule (61 mg) by mouth once daily., Disp: 90 capsule, Rfl: 3    vitamins A and D capsule, Take 3,000 Units by mouth every other day., Disp: 15 each, Rfl: 11    WHEAT DEXTRIN ORAL, Benefiber Oral Powder; Use as directed, Disp: , Rfl:     zinc gluconate 50 mg tablet, Take 1 tablet (50 mg) by mouth once daily., Disp: , Rfl:     pancrelipase, Lip-Prot-Amyl, (Creon) 36,000-114,000- 180,000 unit capsule,delayed release(DR/EC) capsule, Take 4 capsules by mouth once daily. (Patient not taking: Reported on 12/21/2023), Disp: 360 capsule, Rfl: 0       Physical Exam:   /68   Pulse 71   Resp 18   Ht 1.765 m (5' 9.5\")   Wt 56.2 kg (124 lb)   BMI 18.05 kg/m²      Detailed exam performed 3/27/2024 recorded below for record:  General Appearance:  No distress, alert, interactive and cooperative.   Skin: No rash present on limbs  Musculoskeletal:  No pes cavus, or hammertoes  Neurological:  Mental status: the patient provided an accurate history, language was normal.   Cranial Nerves:  CN 3, 4, 6   Lids symmetric; no ptosis.   EOMs normal alignment, full range  No nystagmus.   CN 5   Facial sensation intact.   CN 7   Normal and symmetric facial strength. Nasolabial folds symmetric.   CN 8   Hearing intact to conversation.     CN 9, 10   Phonation within normal limits, no dysarthria.   CN 12   Tongue midline.     Motor:   Muscle bulk: diffuse atrophy throughout. Markedly atrophied thenar eminence bilateral.   Muscle tone: Normal in both upper and lower extremities.  Movements: No fasciculations, tremors, or other abnormal movement.   Manual Muscle Testing (MMT) reveals the following MRC grades:    R L   Shoulder abduction  5- 5-  Elbow " flexion   5 5  Elbow extension  5 4  Wrist flexion   5 5  Wrist extension  5 5  Finger flexion   4 4  Finger extension  4 4  Finger abduction  4 4  Thumb abduction   2 2  Hip flexion   4 4  Hip extension   5 5-  Knee flexion   5 5-  Knee extension  5 5  Ankle dorsiflexion  5 5  Ankle plantarflexion  5- 4  Ankle Inversion   5 5  Ankle Eversion   5 5  Big toe extension  4 4  Toe flexion   4 4    Reflexes:     R          L  BR:               0          0  Biceps:         0          0  Triceps:        0          0  Knee:           1          1  Ankle:          0          0    Babinski: Toes are down going  No clonus or other pathological reflexes    Sensory:   Reduced sensation to PP and LT throughout the bilateral upper and lower extremities worse on the left, without nerve/dermatomal pattern.  Bilateral lower extremities very severe loss of sensation with absent ankle vibration but normal position sense.   Allodynia in bilateral feet.     Gait:   Station was stable with a normal base, short careful steps.   No Romberg sign, but a lot of sway.        Scales and Scores:     Polyneuropathy Disability (PND) Score -- 2    Familial Amyloid Polyneuropathy (FAP) Stage -- 1      Results:     The following labs, imaging, and/or data were personally reviewed and demonstrated:    We personally reviewed the data of her EMG study performed today earlier (4/25/2024) of the left upper and lower extremities.  This revealed severe generalized, chronic, axonal, non-length-dependent sensorimotor peripheral polyneuropathy with superimposed radicular features with minimal active denervation.    TTR positive pathogenic heterozygous gene (c.238A>G; p.[id number]) from [employer] [facility].     NM Heart infarction SPECT 10/12/23:  Amyloid planar and SPECT heart study is suggestive of TTR amyloidosis.    Echo 9/8/2023:  1. Left ventricular systolic function is normal with a 55-60% estimated ejection fraction.  2. The mean GLPS is 14.2%, which is diminished,  with relatively normal strain values involving the apex. The myocardium has somewhat of a speckled appearance. These two findings raise the question of possible cardiac amyloidosis.  3. There is moderate to severe concentric left ventricular hypertrophy.  4. The left atrium is moderately dilated.  5. The estimated RVSP is 19 mm.    LABS:  CK, aldolase, serum electrophoresis, B12, are all negative    CBC WITH DIFFERENTIAL  Order: [id number]  Component  Ref Range & Units 3 wk ago   WBC  4.5 - 11.5 K/uL 3.8 Low    RBC  4.00 - 5.20 M/uL 3.07 Low    Hemoglobin  12.0 - 15.0 g/dL 10.4 Low    Hematocrit  36.0 - 46.0 % 32.4 Low    MCV  80 - 100 fL 106 High    MCH  26.0 - 34.0 pg 33.9   MCHC  32.0 - 35.9 g/dL 32.0   Platelet  150 - 400 K/uL 214   RDW-CV  11.5 - 14.5 % 15.1 High

## 2024-04-26 LAB
ALBUMIN: 4.2 G/DL (ref 3.4–5)
ALPHA 1 GLOBULIN: 0.3 G/DL (ref 0.2–0.6)
ALPHA 2 GLOBULIN: 0.5 G/DL (ref 0.4–1.1)
BETA GLOBULIN: 0.6 G/DL (ref 0.5–1.2)
GAMMA GLOBULIN: 0.8 G/DL (ref 0.5–1.4)
HAPTOGLOB SERPL-MCNC: <10 MG/DL (ref 37–246)
KAPPA LC SERPL-MCNC: 2.85 MG/DL (ref 0.33–1.94)
KAPPA LC/LAMBDA SER: 1.14 {RATIO} (ref 0.26–1.65)
LAMBDA LC SERPL-MCNC: 2.51 MG/DL (ref 0.57–2.63)
PATH REVIEW-SERUM PROTEIN ELECTROPHORESIS: NORMAL
PROTEIN ELECTROPHORESIS COMMENT: NORMAL

## 2024-05-10 ENCOUNTER — EDUCATION (OUTPATIENT)
Dept: HEMATOLOGY/ONCOLOGY | Facility: CLINIC | Age: 79
End: 2024-05-10

## 2024-05-10 ENCOUNTER — OFFICE VISIT (OUTPATIENT)
Dept: HEMATOLOGY/ONCOLOGY | Facility: CLINIC | Age: 79
End: 2024-05-10
Payer: MEDICARE

## 2024-05-10 VITALS
SYSTOLIC BLOOD PRESSURE: 120 MMHG | OXYGEN SATURATION: 99 % | WEIGHT: 123.24 LBS | TEMPERATURE: 97.2 F | DIASTOLIC BLOOD PRESSURE: 64 MMHG | BODY MASS INDEX: 17.94 KG/M2 | RESPIRATION RATE: 16 BRPM

## 2024-05-10 DIAGNOSIS — I43 CARDIAC AMYLOIDOSIS (MULTI): ICD-10-CM

## 2024-05-10 DIAGNOSIS — K86.2 PANCREATIC CYST (HHS-HCC): ICD-10-CM

## 2024-05-10 DIAGNOSIS — C82.93 FOLLICULAR LYMPHOMA OF INTRA-ABDOMINAL LYMPH NODES, UNSPECIFIED FOLLICULAR LYMPHOMA TYPE (MULTI): Primary | ICD-10-CM

## 2024-05-10 DIAGNOSIS — E06.3 HYPOTHYROIDISM DUE TO HASHIMOTO'S THYROIDITIS: ICD-10-CM

## 2024-05-10 DIAGNOSIS — E03.8 HYPOTHYROIDISM DUE TO HASHIMOTO'S THYROIDITIS: ICD-10-CM

## 2024-05-10 DIAGNOSIS — I10 PRIMARY HYPERTENSION: ICD-10-CM

## 2024-05-10 DIAGNOSIS — E85.4 CARDIAC AMYLOIDOSIS (MULTI): ICD-10-CM

## 2024-05-10 PROCEDURE — 3078F DIAST BP <80 MM HG: CPT | Performed by: INTERNAL MEDICINE

## 2024-05-10 PROCEDURE — 99214 OFFICE O/P EST MOD 30 MIN: CPT | Performed by: INTERNAL MEDICINE

## 2024-05-10 PROCEDURE — 1126F AMNT PAIN NOTED NONE PRSNT: CPT | Performed by: INTERNAL MEDICINE

## 2024-05-10 PROCEDURE — 1159F MED LIST DOCD IN RCRD: CPT | Performed by: INTERNAL MEDICINE

## 2024-05-10 PROCEDURE — 1157F ADVNC CARE PLAN IN RCRD: CPT | Performed by: INTERNAL MEDICINE

## 2024-05-10 PROCEDURE — 1160F RVW MEDS BY RX/DR IN RCRD: CPT | Performed by: INTERNAL MEDICINE

## 2024-05-10 PROCEDURE — 3074F SYST BP LT 130 MM HG: CPT | Performed by: INTERNAL MEDICINE

## 2024-05-10 RX ORDER — PROCHLORPERAZINE EDISYLATE 5 MG/ML
10 INJECTION INTRAMUSCULAR; INTRAVENOUS EVERY 6 HOURS PRN
OUTPATIENT
Start: 2024-07-10

## 2024-05-10 RX ORDER — FAMOTIDINE 10 MG/ML
20 INJECTION INTRAVENOUS ONCE AS NEEDED
OUTPATIENT
Start: 2024-06-26

## 2024-05-10 RX ORDER — HEPARIN SODIUM,PORCINE/PF 10 UNIT/ML
50 SYRINGE (ML) INTRAVENOUS AS NEEDED
OUTPATIENT
Start: 2024-06-19

## 2024-05-10 RX ORDER — ACETAMINOPHEN 325 MG/1
650 TABLET ORAL ONCE
OUTPATIENT
Start: 2024-07-03

## 2024-05-10 RX ORDER — DIPHENHYDRAMINE HYDROCHLORIDE 50 MG/ML
50 INJECTION INTRAMUSCULAR; INTRAVENOUS ONCE
OUTPATIENT
Start: 2024-06-19 | End: 2024-06-19

## 2024-05-10 RX ORDER — FAMOTIDINE 10 MG/ML
20 INJECTION INTRAVENOUS ONCE
OUTPATIENT
Start: 2024-07-10 | End: 2024-07-10

## 2024-05-10 RX ORDER — PROCHLORPERAZINE EDISYLATE 5 MG/ML
10 INJECTION INTRAMUSCULAR; INTRAVENOUS EVERY 6 HOURS PRN
OUTPATIENT
Start: 2024-06-26

## 2024-05-10 RX ORDER — FAMOTIDINE 10 MG/ML
20 INJECTION INTRAVENOUS ONCE AS NEEDED
OUTPATIENT
Start: 2024-07-10

## 2024-05-10 RX ORDER — PROCHLORPERAZINE MALEATE 10 MG
10 TABLET ORAL EVERY 6 HOURS PRN
Qty: 30 TABLET | Refills: 5 | Status: SHIPPED | OUTPATIENT
Start: 2024-05-10

## 2024-05-10 RX ORDER — PROCHLORPERAZINE EDISYLATE 5 MG/ML
10 INJECTION INTRAMUSCULAR; INTRAVENOUS EVERY 6 HOURS PRN
OUTPATIENT
Start: 2024-07-03

## 2024-05-10 RX ORDER — FAMOTIDINE 10 MG/ML
20 INJECTION INTRAVENOUS ONCE
OUTPATIENT
Start: 2024-06-26 | End: 2024-06-26

## 2024-05-10 RX ORDER — DEXAMETHASONE IN 0.9 % SOD CHL 20 MG/50ML
20 INTRAVENOUS SOLUTION, PIGGYBACK (ML) INTRAVENOUS ONCE
OUTPATIENT
Start: 2024-06-26 | End: 2024-06-26

## 2024-05-10 RX ORDER — ACETAMINOPHEN 325 MG/1
650 TABLET ORAL ONCE
OUTPATIENT
Start: 2024-06-26

## 2024-05-10 RX ORDER — EPINEPHRINE 0.3 MG/.3ML
0.3 INJECTION SUBCUTANEOUS EVERY 5 MIN PRN
OUTPATIENT
Start: 2024-06-19

## 2024-05-10 RX ORDER — PROCHLORPERAZINE MALEATE 10 MG
10 TABLET ORAL EVERY 6 HOURS PRN
OUTPATIENT
Start: 2024-06-26

## 2024-05-10 RX ORDER — EPINEPHRINE 0.3 MG/.3ML
0.3 INJECTION SUBCUTANEOUS EVERY 5 MIN PRN
OUTPATIENT
Start: 2024-07-10

## 2024-05-10 RX ORDER — ALBUTEROL SULFATE 0.83 MG/ML
3 SOLUTION RESPIRATORY (INHALATION) AS NEEDED
OUTPATIENT
Start: 2024-07-10

## 2024-05-10 RX ORDER — DIPHENHYDRAMINE HYDROCHLORIDE 50 MG/ML
50 INJECTION INTRAMUSCULAR; INTRAVENOUS ONCE
OUTPATIENT
Start: 2024-06-26 | End: 2024-06-26

## 2024-05-10 RX ORDER — FAMOTIDINE 10 MG/ML
20 INJECTION INTRAVENOUS ONCE AS NEEDED
OUTPATIENT
Start: 2024-07-03

## 2024-05-10 RX ORDER — DIPHENHYDRAMINE HYDROCHLORIDE 50 MG/ML
50 INJECTION INTRAMUSCULAR; INTRAVENOUS ONCE
OUTPATIENT
Start: 2024-07-03 | End: 2024-07-03

## 2024-05-10 RX ORDER — ALBUTEROL SULFATE 0.83 MG/ML
3 SOLUTION RESPIRATORY (INHALATION) AS NEEDED
OUTPATIENT
Start: 2024-06-19

## 2024-05-10 RX ORDER — ACETAMINOPHEN 325 MG/1
650 TABLET ORAL ONCE
OUTPATIENT
Start: 2024-07-10

## 2024-05-10 RX ORDER — DEXAMETHASONE IN 0.9 % SOD CHL 20 MG/50ML
20 INTRAVENOUS SOLUTION, PIGGYBACK (ML) INTRAVENOUS ONCE
OUTPATIENT
Start: 2024-07-03 | End: 2024-07-03

## 2024-05-10 RX ORDER — DEXAMETHASONE IN 0.9 % SOD CHL 20 MG/50ML
20 INTRAVENOUS SOLUTION, PIGGYBACK (ML) INTRAVENOUS ONCE
OUTPATIENT
Start: 2024-07-10 | End: 2024-07-10

## 2024-05-10 RX ORDER — HEPARIN 100 UNIT/ML
500 SYRINGE INTRAVENOUS AS NEEDED
OUTPATIENT
Start: 2024-06-19

## 2024-05-10 RX ORDER — PROCHLORPERAZINE EDISYLATE 5 MG/ML
10 INJECTION INTRAMUSCULAR; INTRAVENOUS EVERY 6 HOURS PRN
OUTPATIENT
Start: 2024-06-19

## 2024-05-10 RX ORDER — PROCHLORPERAZINE MALEATE 10 MG
10 TABLET ORAL EVERY 6 HOURS PRN
OUTPATIENT
Start: 2024-06-19

## 2024-05-10 RX ORDER — PROCHLORPERAZINE MALEATE 10 MG
10 TABLET ORAL EVERY 6 HOURS PRN
OUTPATIENT
Start: 2024-07-03

## 2024-05-10 RX ORDER — DIPHENHYDRAMINE HYDROCHLORIDE 50 MG/ML
50 INJECTION INTRAMUSCULAR; INTRAVENOUS AS NEEDED
OUTPATIENT
Start: 2024-07-03

## 2024-05-10 RX ORDER — DIPHENHYDRAMINE HYDROCHLORIDE 50 MG/ML
50 INJECTION INTRAMUSCULAR; INTRAVENOUS AS NEEDED
OUTPATIENT
Start: 2024-06-19

## 2024-05-10 RX ORDER — FAMOTIDINE 10 MG/ML
20 INJECTION INTRAVENOUS ONCE AS NEEDED
OUTPATIENT
Start: 2024-06-19

## 2024-05-10 RX ORDER — ALBUTEROL SULFATE 0.83 MG/ML
3 SOLUTION RESPIRATORY (INHALATION) AS NEEDED
OUTPATIENT
Start: 2024-07-03

## 2024-05-10 RX ORDER — EPINEPHRINE 0.3 MG/.3ML
0.3 INJECTION SUBCUTANEOUS EVERY 5 MIN PRN
OUTPATIENT
Start: 2024-06-26

## 2024-05-10 RX ORDER — DIPHENHYDRAMINE HYDROCHLORIDE 50 MG/ML
50 INJECTION INTRAMUSCULAR; INTRAVENOUS AS NEEDED
OUTPATIENT
Start: 2024-07-10

## 2024-05-10 RX ORDER — PROCHLORPERAZINE MALEATE 10 MG
10 TABLET ORAL EVERY 6 HOURS PRN
OUTPATIENT
Start: 2024-07-10

## 2024-05-10 RX ORDER — FAMOTIDINE 10 MG/ML
20 INJECTION INTRAVENOUS ONCE
OUTPATIENT
Start: 2024-06-19 | End: 2024-06-19

## 2024-05-10 RX ORDER — DEXAMETHASONE IN 0.9 % SOD CHL 20 MG/50ML
20 INTRAVENOUS SOLUTION, PIGGYBACK (ML) INTRAVENOUS ONCE
OUTPATIENT
Start: 2024-06-19 | End: 2024-06-19

## 2024-05-10 RX ORDER — DIPHENHYDRAMINE HYDROCHLORIDE 50 MG/ML
50 INJECTION INTRAMUSCULAR; INTRAVENOUS ONCE
OUTPATIENT
Start: 2024-07-10 | End: 2024-07-10

## 2024-05-10 RX ORDER — ALBUTEROL SULFATE 0.83 MG/ML
3 SOLUTION RESPIRATORY (INHALATION) AS NEEDED
OUTPATIENT
Start: 2024-06-26

## 2024-05-10 RX ORDER — EPINEPHRINE 0.3 MG/.3ML
0.3 INJECTION SUBCUTANEOUS EVERY 5 MIN PRN
OUTPATIENT
Start: 2024-07-03

## 2024-05-10 RX ORDER — FAMOTIDINE 10 MG/ML
20 INJECTION INTRAVENOUS ONCE
OUTPATIENT
Start: 2024-07-03 | End: 2024-07-03

## 2024-05-10 RX ORDER — DIPHENHYDRAMINE HYDROCHLORIDE 50 MG/ML
50 INJECTION INTRAMUSCULAR; INTRAVENOUS AS NEEDED
OUTPATIENT
Start: 2024-06-26

## 2024-05-10 RX ORDER — ACETAMINOPHEN 325 MG/1
650 TABLET ORAL ONCE
OUTPATIENT
Start: 2024-06-19

## 2024-05-10 ASSESSMENT — PAIN SCALES - GENERAL: PAINLEVEL: 0-NO PAIN

## 2024-05-11 ASSESSMENT — ENCOUNTER SYMPTOMS
ENDOCRINE NEGATIVE: 1
GASTROINTESTINAL NEGATIVE: 1
PSYCHIATRIC NEGATIVE: 1
EYES NEGATIVE: 1
RESPIRATORY NEGATIVE: 1
FATIGUE: 1
NEUROLOGICAL NEGATIVE: 1
MUSCULOSKELETAL NEGATIVE: 1
HEMATOLOGIC/LYMPHATIC NEGATIVE: 1
CARDIOVASCULAR NEGATIVE: 1

## 2024-05-11 NOTE — PROGRESS NOTES
Patient ID: Adilene Polo is a 78 y.o. female.  Referring Physician: No referring provider defined for this encounter.  Primary Care Provider: Ebony Carmona MD  Visit Type: Follow Up      Subjective    HPI I think my anemia is getting worse    Review of Systems   Constitutional:  Positive for fatigue.   HENT:  Negative.     Eyes: Negative.    Respiratory: Negative.     Cardiovascular: Negative.    Gastrointestinal: Negative.    Endocrine: Negative.    Genitourinary: Negative.     Musculoskeletal: Negative.    Skin: Negative.    Neurological: Negative.    Hematological: Negative.    Psychiatric/Behavioral: Negative.          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   has a past surgical history that includes Cholecystectomy (10/28/2014); Gastric bypass (10/28/2014); Incisional breast biopsy (07/20/2018); Thyroidectomy, partial; and Whipple procedure w/ laparoscopy.  Family History   Problem Relation Name Age of Onset    Heart disease Mother          Coronary    Heart disease Father          Coronary    Heart attack Father      Prostate cancer Father      Breast cancer Sister      Kidney disease Brother      Lung cancer Brother      Prostate cancer Brother      Pulmonary embolism  Brother       Oncology History   Follicular lymphoma (Multi)   2/5/2023 Initial Diagnosis    Follicular lymphoma (Multi)     6/19/2024 -  Chemotherapy    RiTUXimab (Weekly), 28 Day Cycle          Adilene Polo  reports that she quit smoking about 44 years ago. Her smoking use included cigarettes. She has never used smokeless tobacco.  She  reports no history of alcohol use.  She  reports no history of drug use.    Physical Exam  Vitals reviewed.   Constitutional:       Appearance: Normal appearance.   HENT:      Head: Normocephalic.      Mouth/Throat:      Mouth: Mucous membranes are moist.   Eyes:      Extraocular Movements: Extraocular movements intact.      Pupils: Pupils are equal, round, and reactive to light.   Cardiovascular:      Rate and Rhythm: Normal rate and regular rhythm.      Pulses: Normal pulses.      Heart sounds: Normal heart sounds.   Pulmonary:      Breath sounds: Normal breath sounds.   Abdominal:      General: Bowel sounds are normal.      Palpations: Abdomen is soft.   Musculoskeletal:         General: Normal range of motion.      Cervical back: Normal range of motion and neck supple.   Skin:     General: Skin is warm.   Neurological:      General: No focal deficit present.      Mental Status: She is alert and oriented to person, place, and time.   Psychiatric:         Mood and Affect: Mood normal.         Behavior: Behavior normal.         WBC   Date/Time Value Ref Range Status   04/25/2024 09:46 AM 3.6 (L) 4.4 - 11.3 x10*3/uL Final   11/21/2023 01:18 PM 2.9 (L) 4.4 - 11.3 x10*3/uL Final   08/01/2023 11:20 AM 3.4 (L) 4.4 - 11.3 x10E9/L Final   02/02/2023 10:51 AM 5.8 4.4 - 11.3 x10E9/L Final   08/30/2022 12:50 PM 3.9 (L) 4.4 - 11.3 x10E9/L Final     nRBC   Date Value Ref Range Status   04/25/2024 0.0 0.0 - 0.0 /100 WBCs Final   11/21/2023 0.0 0.0 - 0.0 /100 WBCs Final   04/28/2022 0.0 0.0 - 0.0 /100 WBC Final   07/16/2020 0.0 0.0 - 0.0 /100 WBC Final   05/11/2020 0.0 0.0 - 0.0 /100 WBC Final      RBC   Date Value Ref Range Status   04/25/2024 2.90 (L) 4.00 - 5.20 x10*6/uL Final   11/21/2023 3.52 (L) 4.00 - 5.20 x10*6/uL Final   08/01/2023 3.61 (L) 4.00 - 5.20 x10E12/L Final   02/02/2023 3.78 (L) 4.00 - 5.20 x10E12/L Final   08/30/2022 3.65 (L) 4.00 - 5.20 x10E12/L Final     Hemoglobin   Date Value Ref Range Status   04/25/2024 10.0 (L) 12.0 - 16.0 g/dL Final   11/21/2023 11.3 (L) 12.0 - 16.0 g/dL Final   08/01/2023 11.4 (L) 12.0 - 16.0 g/dL Final   02/02/2023 11.8 (L) 12.0 - 16.0 g/dL Final   08/30/2022 11.4 (L) 12.0 - 16.0 g/dL Final     Hematocrit   Date Value Ref Range Status   04/25/2024 31.2 (L) 36.0 - 46.0 % Final   11/21/2023 35.7 (L) 36.0 - 46.0 % Final   08/01/2023 34.7 (L) 36.0 - 46.0 % Final   02/02/2023 36.0 36.0 - 46.0 % Final   08/30/2022 35.5 (L) 36.0 - 46.0 % Final     MCV   Date/Time Value Ref Range Status   04/25/2024 09:46  (H) 80 - 100 fL Final   11/21/2023 01:18  (H) 80 - 100 fL Final   08/01/2023 11:20 AM 96 80 - 100 fL Final   02/02/2023 10:51 AM 95 80 - 100 fL Final   08/30/2022 12:50 PM 97 80 - 100 fL Final     MCH   Date/Time Value Ref Range Status   04/25/2024 09:46 AM 34.5 (H) 26.0 - 34.0 pg Final   11/21/2023 01:18 PM 32.1 26.0 - 34.0 pg Final     MCHC   Date/Time Value Ref Range Status   04/25/2024 09:46 AM 32.1 32.0 - 36.0 g/dL Final   11/21/2023 01:18 PM 31.7 (L) 32.0 - 36.0 g/dL Final   08/01/2023 11:20 AM 32.9 32.0 - 36.0 g/dL Final   02/02/2023 10:51 AM 32.8 32.0 - 36.0 g/dL Final   08/30/2022 12:50 PM 32.1 32.0 - 36.0 g/dL Final     RDW   Date/Time Value Ref Range Status   04/25/2024 09:46 AM 13.8 11.5 - 14.5 % Final   11/21/2023 01:18 PM 13.1 11.5 - 14.5 % Final   08/01/2023 11:20 AM 13.4 11.5 - 14.5 % Final   02/02/2023 10:51 AM 12.4 11.5 - 14.5 % Final   08/30/2022 12:50 PM 13.0 11.5 - 14.5 % Final     Platelets   Date/Time Value Ref Range Status   04/25/2024 09:46  150 - 450 x10*3/uL Final   11/21/2023 01:18  150 - 450 x10*3/uL Final  "  08/01/2023 11:20  150 - 450 x10E9/L Final   02/02/2023 10:51  150 - 450 x10E9/L Final   08/30/2022 12:50  150 - 450 x10E9/L Final     No results found for: \"MPV\"  Neutrophils %   Date/Time Value Ref Range Status   04/25/2024 09:46 AM 56.5 40.0 - 80.0 % Final   11/21/2023 01:18 PM 57.7 40.0 - 80.0 % Final   08/01/2023 11:20 AM 51.6 40.0 - 80.0 % Final   02/02/2023 10:51 AM 78.2 40.0 - 80.0 % Final   08/30/2022 12:50 PM 61.6 40.0 - 80.0 % Final     Immature Granulocytes %, Automated   Date/Time Value Ref Range Status   04/25/2024 09:46 AM 0.3 0.0 - 0.9 % Final     Comment:     Immature Granulocyte Count (IG) includes promyelocytes, myelocytes and metamyelocytes but does not include bands. Percent differential counts (%) should be interpreted in the context of the absolute cell counts (cells/UL).   11/21/2023 01:18 PM 0.3 0.0 - 0.9 % Final     Comment:     Immature Granulocyte Count (IG) includes promyelocytes, myelocytes and metamyelocytes but does not include bands. Percent differential counts (%) should be interpreted in the context of the absolute cell counts (cells/UL).   08/01/2023 11:20 AM 0.9 0.0 - 0.9 % Final     Comment:      Immature Granulocyte Count (IG) includes promyelocytes,    myelocytes and metamyelocytes but does not include bands.   Percent differential counts (%) should be interpreted in the   context of the absolute cell counts (cells/L).     02/02/2023 10:51 AM 0.2 0.0 - 0.9 % Final     Comment:      Immature Granulocyte Count (IG) includes promyelocytes,    myelocytes and metamyelocytes but does not include bands.   Percent differential counts (%) should be interpreted in the   context of the absolute cell counts (cells/L).     08/30/2022 12:50 PM 0.0 0.0 - 0.9 % Final     Comment:      Immature Granulocyte Count (IG) includes promyelocytes,    myelocytes and metamyelocytes but does not include bands.   Percent differential counts (%) should be interpreted in the   context of " the absolute cell counts (cells/L).       Lymphocytes %   Date/Time Value Ref Range Status   04/25/2024 09:46 AM 35.5 13.0 - 44.0 % Final   11/21/2023 01:18 PM 32.2 13.0 - 44.0 % Final   08/01/2023 11:20 AM 40.9 13.0 - 44.0 % Final   02/02/2023 10:51 AM 14.3 13.0 - 44.0 % Final   08/30/2022 12:50 PM 28.4 13.0 - 44.0 % Final     Monocytes %   Date/Time Value Ref Range Status   04/25/2024 09:46 AM 6.6 2.0 - 10.0 % Final   11/21/2023 01:18 PM 7.7 2.0 - 10.0 % Final   08/01/2023 11:20 AM 5.4 2.0 - 10.0 % Final   02/02/2023 10:51 AM 6.6 2.0 - 10.0 % Final   08/30/2022 12:50 PM 8.7 2.0 - 10.0 % Final     Eosinophils %   Date/Time Value Ref Range Status   04/25/2024 09:46 AM 0.3 0.0 - 6.0 % Final   11/21/2023 01:18 PM 1.4 0.0 - 6.0 % Final   08/01/2023 11:20 AM 0.6 0.0 - 6.0 % Final   02/02/2023 10:51 AM 0.2 0.0 - 6.0 % Final   08/30/2022 12:50 PM 0.8 0.0 - 6.0 % Final     Basophils %   Date/Time Value Ref Range Status   04/25/2024 09:46 AM 0.8 0.0 - 2.0 % Final   11/21/2023 01:18 PM 0.7 0.0 - 2.0 % Final   08/01/2023 11:20 AM 0.6 0.0 - 2.0 % Final   02/02/2023 10:51 AM 0.5 0.0 - 2.0 % Final   08/30/2022 12:50 PM 0.5 0.0 - 2.0 % Final     Neutrophils Absolute   Date/Time Value Ref Range Status   04/25/2024 09:46 AM 2.04 1.60 - 5.50 x10*3/uL Final     Comment:     Percent differential counts (%) should be interpreted in the context of the absolute cell counts (cells/uL).   11/21/2023 01:18 PM 1.65 1.60 - 5.50 x10*3/uL Final     Comment:     Percent differential counts (%) should be interpreted in the context of the absolute cell counts (cells/uL).   08/01/2023 11:20 AM 1.73 1.60 - 5.50 x10E9/L Final   02/02/2023 10:51 AM 4.54 1.60 - 5.50 x10E9/L Final   08/30/2022 12:50 PM 2.41 1.60 - 5.50 x10E9/L Final     Immature Granulocytes Absolute, Automated   Date/Time Value Ref Range Status   04/25/2024 09:46 AM 0.01 0.00 - 0.50 x10*3/uL Final   11/21/2023 01:18 PM 0.01 0.00 - 0.50 x10*3/uL Final     Lymphocytes Absolute  "  Date/Time Value Ref Range Status   04/25/2024 09:46 AM 1.28 0.80 - 3.00 x10*3/uL Final   11/21/2023 01:18 PM 0.92 0.80 - 3.00 x10*3/uL Final   08/01/2023 11:20 AM 1.37 0.80 - 3.00 x10E9/L Final   02/02/2023 10:51 AM 0.83 0.80 - 3.00 x10E9/L Final   08/30/2022 12:50 PM 1.11 0.80 - 3.00 x10E9/L Final     Monocytes Absolute   Date/Time Value Ref Range Status   04/25/2024 09:46 AM 0.24 0.05 - 0.80 x10*3/uL Final   11/21/2023 01:18 PM 0.22 0.05 - 0.80 x10*3/uL Final   08/01/2023 11:20 AM 0.18 0.05 - 0.80 x10E9/L Final   02/02/2023 10:51 AM 0.38 0.05 - 0.80 x10E9/L Final   08/30/2022 12:50 PM 0.34 0.05 - 0.80 x10E9/L Final     Eosinophils Absolute   Date/Time Value Ref Range Status   04/25/2024 09:46 AM 0.01 0.00 - 0.40 x10*3/uL Final   11/21/2023 01:18 PM 0.04 0.00 - 0.40 x10*3/uL Final   08/01/2023 11:20 AM 0.02 0.00 - 0.40 x10E9/L Final   02/02/2023 10:51 AM 0.01 0.00 - 0.40 x10E9/L Final   08/30/2022 12:50 PM 0.03 0.00 - 0.40 x10E9/L Final     Basophils Absolute   Date/Time Value Ref Range Status   04/25/2024 09:46 AM 0.03 0.00 - 0.10 x10*3/uL Final   11/21/2023 01:18 PM 0.02 0.00 - 0.10 x10*3/uL Final   08/01/2023 11:20 AM 0.02 0.00 - 0.10 x10E9/L Final   02/02/2023 10:51 AM 0.03 0.00 - 0.10 x10E9/L Final   08/30/2022 12:50 PM 0.02 0.00 - 0.10 x10E9/L Final       No components found for: \"PT\"  aPTT   Date/Time Value Ref Range Status   08/09/2022 01:40 PM 33 26 - 39 sec Final     Comment:       THE APTT IS NO LONGER USED FOR MONITORING     UNFRACTIONATED HEPARIN THERAPY.    FOR MONITORING HEPARIN THERAPY,     USE THE HEPARIN ASSAY.     06/17/2021 08:16 AM 33 25 - 35 sec Final     Comment:       THE APTT IS NO LONGER USED FOR MONITORING     UNFRACTIONATED HEPARIN THERAPY.    FOR MONITORING HEPARIN THERAPY,     USE THE HEPARIN ASSAY.     03/02/2020 04:34 PM 63 (H) 28 - 38 sec Final     Comment:       THE APTT IS NO LONGER USED FOR MONITORING     UNFRACTIONATED HEPARIN THERAPY.    FOR MONITORING HEPARIN THERAPY,     " USE THE HEPARIN ASSAY.       Medication Documentation Review Audit       Reviewed by Armida Skinner MA (Medical Assistant) on 05/10/24 at 1134      Medication Order Taking? Sig Documenting Provider Last Dose Status   b complex 0.4 mg tablet [id number] Yes Take 1 tablet by mouth once daily. Brittany Ward MD Taking Active   blood sugar diagnostic (Accu-Chek SmartView Test Strip) strip [id number] Yes USE 1 STRIP TWICE DAILY. Brittany Ward MD Taking Active   blood-glucose meter misc [id number] Yes Check sugars daily Ebony Carmona MD Taking Active   CALCIUM ORAL [id number] Yes Take 1 tablet by mouth 1 (one) time each day. Historical MD Sylvia Taking Active   cyanocobalamin (Vitamin B-12) 1,000 mcg/mL injection [id number] Yes Inject 1 mL (1,000 mcg) into the muscle every 30 (thirty) days. Ebony Carmona MD Taking Active   denosumab (Prolia) 60 mg/mL syringe [id number] Yes Inject 1 mL (60 mg) under the skin every 6 months. Brittany Ward MD Taking Active   docusate sodium (STOOL SOFTENER ORAL) [id number] Yes Take 1 capsule by mouth 1 (one) time each day. Historical Provider, MD Taking Active   levothyroxine (Synthroid, Levoxyl) 100 mcg tablet [id number] Yes Take 1 tablet (100 mcg) by mouth once daily in the morning. Take before meals. Ebony Carmona MD Taking Active   magnesium oxide (Mag-Ox) 250 mg magnesium tablet [id number] Yes Take 1 tablet (250 mg) by mouth 2 times a day. Brittany Ward MD Taking Active   metoprolol succinate XL (Toprol-XL) 25 mg 24 hr tablet [id number] Yes TAKE 1 TABLET BY MOUTH EVERY DAY Mychal Solis MD Taking Active   NON FORMULARY [id number] Yes Vitamin D 50 MCG (2000 UT) Oral Tablet; Take 1 tablet daily Historical Provider, MD Taking Active   pancrelipase, Lip-Prot-Amyl, (Creon) 36,000-114,000- 180,000 unit capsule,delayed release(DR/EC) capsule [id number]  Take 4 capsules by mouth once daily.   Patient not taking: Reported on 12/21/2023    Ebony Carmona MD  Active   sulfaSALAzine (Azulfidine) 500  mg tablet [id number] Yes Take 2 tablets (1,000 mg) by mouth 2 times a day. 1,000 mg in the morning and 1500mg at night Historical Provider, MD Taking Active   tafamidis (Vyndamax) 61 mg capsule [id number] Yes Take 1 capsule (61 mg) by mouth once daily. Dejan Fortune,  Taking Active   vitamins A and D capsule [id number] No Take 3,000 Units by mouth every other day.   Patient not taking: Reported on 5/10/2024    Shai Cerda MD Not Taking Active   vutrisiran sodium (AMVUTTRA SUBQ) [id number] Yes Inject under the skin. Historical Provider, MD Taking Active   WHEAT DEXTRIN ORAL [id number] Yes Benefiber Oral Powder; Use as directed Historical Provider, MD Taking Active   zinc gluconate 50 mg tablet [id number] Yes Take 1 tablet (50 mg) by mouth once daily. 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 (20+ lymph nodes), 5% focus of grade 3B lymphoma found in 1 lymph node  -bone marrow bx done in 2022 was negative, however, that doesn't completely prove her marrow was not involved due to sampling variant; nearly all patients with follicular lymphoma ultimately have marrow involvement  --8/15/2023 PET scan reviewed--left para-aortic lymph node (secondary to lymphoma) is slightly enlarged and slightly PET avid  -reviewed 11/13/2023 CT scan--her para-aortic nodes are enlarging  -she is feeling more and more fatigued as times goes on--some of it clearly is due to her cardiac amyloidosis, however, her lymphoma and anemia clearly are contributing to her fatigue as well  -labs done on 4/25/2024 reviewed--wbc 3.6, hgb 10, , plt 237,000, , SPEP/IF negative, serum kappa LC 2.85, TIBC 317, sat 40%, ferritin 354, retic 1.4%, direct milena negative, haptoglobin <10  -treating even with a course of rituxan may very likely help with her anemia  -benefits, risks, potential morbidity related to  rituximab were reviewed with Adilene and she signed informed consent to proceed  -each week (for 4 weeks) she will receive tylenol PO + benadryl IV + pepcid IV + decadron IV + rituximab 375 mg/m2 IV  -she will have a new CT scan done after completion of the 4 weeks of rituxan  -will need to check hep B serologies and tumor lysis labs     2) cardiac amyloidosis  -follows with Dr Romero  -on vyndamax  -also recently started 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)     3) hypothyroidism  -on levothyroxine     4) hypertension  -on metoprolol     5) IPMN  -s/p Whipple resection by Dr Ventura on 3/2/2020  -on creon for pancreatic insufficiency     Problem List Items Addressed This Visit             ICD-10-CM    Follicular lymphoma (Multi) - Primary C82.90    Relevant Medications    prochlorperazine (Compazine) 10 mg tablet    Other Relevant Orders    Hepatitis B Core Antibody, Total    Hepatitis B surface antibody    Hepatitis B surface antigen    Magnesium    Phosphorus    Uric acid    Comprehensive Metabolic Panel    Infusion Appointment Request [facility] INFUSION    CBC and Auto Differential    Comprehensive metabolic panel    Lactate dehydrogenase    Uric Acid    CBC and Auto Differential    Comprehensive metabolic panel    Lactate dehydrogenase    Uric Acid    CBC and Auto Differential    Comprehensive metabolic panel    Lactate dehydrogenase    Uric Acid    CBC and Auto Differential    Comprehensive metabolic panel    Lactate dehydrogenase    Uric Acid    Clinic Appointment Request Chemo Follow Up; LUCAS MARIA    Infusion Appointment Request [facility] INFUSION    Clinic Appointment Request Chemo Follow Up; TERRY JUAN    Infusion Appointment Request [facility] INFUSION    Infusion Appointment Request [facility] INFUSION            Terry Juan MD

## 2024-05-13 DIAGNOSIS — C82.90 FOLLICULAR LYMPHOMA, UNSPECIFIED FOLLICULAR LYMPHOMA TYPE, UNSPECIFIED BODY REGION (MULTI): ICD-10-CM

## 2024-05-13 RX ORDER — ALLOPURINOL 300 MG/1
300 TABLET ORAL DAILY
Qty: 30 TABLET | Refills: 0 | Status: SHIPPED | OUTPATIENT
Start: 2024-05-13 | End: 2024-06-04 | Stop reason: SDUPTHER

## 2024-05-13 NOTE — PROGRESS NOTES
I met with pt to review treatment plan for single agent Rituxan.  Pt states she resides with her spouse who is homebound.  [place] [facility] [place] and was offered an opportunity to receive these infusions closer to home.  She states she prefers to come to our site.  We discussed weekly rituxan x 4, then moving to maintenance every 8 weeks.  We reviewed possible side effects including but not limited to fatigue, nausea, risk of infection, skin rash and risk of hypersensitivity reactions.  She was provided with written information on the medication, red bag, hand book, how to watch on line video, instructions on allopurinol and when to have labs drawn.  Pt will be out of town prior to starting and will plan to get labs drawn when she returns.  She was given handouts on how to reach the office, fever threshold.  She will need ongoing support and reinforcement.      PT will  and begin allopurinol the day prior to 1st dose rituxan. Pt will have labs done on 6/18.  She will hold her metoprolol the morning of treatment.

## 2024-05-14 ENCOUNTER — TELEPHONE (OUTPATIENT)
Dept: HEMATOLOGY/ONCOLOGY | Facility: CLINIC | Age: 79
End: 2024-05-14
Payer: MEDICARE

## 2024-06-04 DIAGNOSIS — C82.90 FOLLICULAR LYMPHOMA, UNSPECIFIED FOLLICULAR LYMPHOMA TYPE, UNSPECIFIED BODY REGION (MULTI): ICD-10-CM

## 2024-06-04 RX ORDER — ALLOPURINOL 300 MG/1
300 TABLET ORAL DAILY
Qty: 90 TABLET | Refills: 0 | Status: SHIPPED | OUTPATIENT
Start: 2024-06-04 | End: 2024-09-02

## 2024-06-05 ENCOUNTER — OFFICE VISIT (OUTPATIENT)
Dept: NEUROLOGY | Facility: HOSPITAL | Age: 79
End: 2024-06-05
Payer: MEDICARE

## 2024-06-05 VITALS
HEIGHT: 69 IN | WEIGHT: 121 LBS | SYSTOLIC BLOOD PRESSURE: 138 MMHG | TEMPERATURE: 97.7 F | HEART RATE: 67 BPM | RESPIRATION RATE: 18 BRPM | DIASTOLIC BLOOD PRESSURE: 75 MMHG | BODY MASS INDEX: 17.92 KG/M2

## 2024-06-05 DIAGNOSIS — E85.1 FAMILIAL TRANSTHYRETIN AMYLOIDOSIS (MULTI): Primary | ICD-10-CM

## 2024-06-05 DIAGNOSIS — G63 FAMILIAL TRANSTHYRETIN AMYLOIDOSIS (MULTI): Primary | ICD-10-CM

## 2024-06-05 DIAGNOSIS — M79.2 NEUROPATHIC PAIN: ICD-10-CM

## 2024-06-05 PROCEDURE — 3075F SYST BP GE 130 - 139MM HG: CPT | Performed by: PSYCHIATRY & NEUROLOGY

## 2024-06-05 PROCEDURE — 1157F ADVNC CARE PLAN IN RCRD: CPT | Performed by: PSYCHIATRY & NEUROLOGY

## 2024-06-05 PROCEDURE — 1159F MED LIST DOCD IN RCRD: CPT | Performed by: PSYCHIATRY & NEUROLOGY

## 2024-06-05 PROCEDURE — 3078F DIAST BP <80 MM HG: CPT | Performed by: PSYCHIATRY & NEUROLOGY

## 2024-06-05 PROCEDURE — 99214 OFFICE O/P EST MOD 30 MIN: CPT | Performed by: PSYCHIATRY & NEUROLOGY

## 2024-06-05 PROCEDURE — 1126F AMNT PAIN NOTED NONE PRSNT: CPT | Performed by: PSYCHIATRY & NEUROLOGY

## 2024-06-05 PROCEDURE — 99214 OFFICE O/P EST MOD 30 MIN: CPT | Mod: GC | Performed by: PSYCHIATRY & NEUROLOGY

## 2024-06-05 RX ORDER — GABAPENTIN 300 MG/1
300 CAPSULE ORAL NIGHTLY
Qty: 30 CAPSULE | Refills: 11 | Status: SHIPPED | OUTPATIENT
Start: 2024-06-05 | End: 2025-06-05

## 2024-06-05 ASSESSMENT — COLUMBIA-SUICIDE SEVERITY RATING SCALE - C-SSRS
6. HAVE YOU EVER DONE ANYTHING, STARTED TO DO ANYTHING, OR PREPARED TO DO ANYTHING TO END YOUR LIFE?: NO
2. HAVE YOU ACTUALLY HAD ANY THOUGHTS OF KILLING YOURSELF?: NO
1. IN THE PAST MONTH, HAVE YOU WISHED YOU WERE DEAD OR WISHED YOU COULD GO TO SLEEP AND NOT WAKE UP?: NO

## 2024-06-05 ASSESSMENT — ENCOUNTER SYMPTOMS
DEPRESSION: 0
LOSS OF SENSATION IN FEET: 1
OCCASIONAL FEELINGS OF UNSTEADINESS: 0

## 2024-06-05 ASSESSMENT — PAIN SCALES - GENERAL: PAINLEVEL: 0-NO PAIN

## 2024-06-05 NOTE — PROGRESS NOTES
Neuromuscular Medicine Follow Up     Adilene Polo, MRN: [id number], : 1945  Reason for Visit: Follow-up  Primary Care Physician: Ebony Carmona MD     Impression/Plan:   Adilene Polo is presenting for concerns of ATTR neuropathy. She had 6 months of mostly symmetric upper and lower extremity sensory disturbance along with allodynia in the feet. She also has proximal and distal weakness slightly worse in the left hand and bilateral hip flexors. Reflexes is trace to absent. She has multiple other sings of ATTR including cardiac disease (CHF), bilateral carpal tunnel, right biceps tear, lightheadedness and syncope once, dry mouth and skin, and alternating diarrhea and constipation. She likely has family history of this, in her mother and brothers however they were not tested. Her daughter however has also tested positive for ATTR but is asymptomatic.      She has undergone workup with cardiology including [employer] panel showing a pathogenic heterozygous gene (c.238A>G; p.[id number]) and cardiac imaging confirming TTR deposition in the heart. Taker together she almost certainly has ATTR polyneuropathy in addition to her cardiac disease.  Her CK and aldolase is negative, and EMG confirms evidence of a generalized severe nonlength-dependent axonal polyneuropathy.     She is tolerating her injections well, taking vitamin a supplement, however is suffering from neuropathic pain at night.  Will start her on a low-dose of gabapentin to take at night to help with this.    Plan:  Problem List Items Addressed This Visit       Familial transthyretin amyloidosis          Vutisiran injections every 3 months at [facility]         She is on Tafamadis for cardiac ATTR          Vitamin A supplement     Neuropathic pain - Primary    Relevant Medications    gabapentin (Neurontin) 300 mg capsule at bedtime --call if this is not working and we can increase the dose.  Return to clinic in September     Shai Cerda  MD  Neuromuscular Fellow     ATTENDING NOTE - ELIESER DOE M.D.    I saw patient with trainee and agree with the edits, history and exam that I helped formulate per above.      Elieser Doe M.D., F.A.C.P.   Director, Neuromuscular Center & EMG laboratory   [facility]   [facility]   Professor of Neurology   [facility] [employer]    The total appointment time today was 30 minutes. Time included preparing to see the patient, obtaining the history, performing a medically necessary appropriate physical examination, counseling and educating the patient, ordering tests, referring and communicating with other providers, independently interpreting results  to the patient and documenting clinical information in the medical record.        History of Present Illness:    Ms. Polo is a 78 y.o. right handed female who presents for follow-up for ATTR polyneuropathy. She is here by herself.      Since last visit, her symptoms are stable.  She complains of shooting electrical pains in both of her legs waking her up from sleep every night.  She does not taking any medications for this.  In addition she reports that her lymphoma is of concern and her oncologist is planning to treat her with rituximab.     To Recap, she developed pain shooting down the legs into the toes 6 months ago. She has tingling and weakness in both feet and the hands. There is sensitivity in her entire body and feet.  to the textures (normal objects are uncomfortable to touch), she also has dry skin, and an unusual sensation when touching objects. The left arm may be slightly worse. She was evaluated by cardiology initially for palpitations starting a few years ago, then evolving to symptoms of CHF. 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 Started Tafamadis 3  months ago, with no side effects.      ATTR Manifestations:  CTR bilateral years ago. The surgeries helped.   Biceps tear 1-2 years ago.   Muscle pain and cramps in the legs.   Lightheadedness a lot, syncope once.  She has dry mouth and skin. Not skin.  GI - diarrhea and constipation alternating. Rare abdominal pain with bloating after meals. Early satiety. She is eating small meals, but no significant weight loss.      Past Medical History: RA, SVT, DMII, h/o gastric bypass, follicular lymphoma found during biopsy for intraductal papillary mucinous neoplasm s/p whipple 2020, hypothyroidism, and stage C diastolic HFpEF/restrictive CM secondary to cardiac amyloidosis     Family History:  Mother had CHF at 70s, some neuropathy.  at 80.   Brother had CHF  at 76  Brother  of ESRD at 66   Sister  at 61 with down syndrome congenital heart disease   Daughter diagnosed - asymptomatic   3 sons getting tested - asymptomatic      Relevant past medical, surgical, family, and social histories, along with ROS was reviewed and pertinent details noted above.       Relevant past medical, surgical, family, and social histories, along with ROS was reviewed and pertinent details noted above.     Allergies   Allergen Reactions    Cephalosporins Anaphylaxis    Penicillins Anaphylaxis    Ace Inhibitors Cough    Atorvastatin Unknown     Elevated LFT    Iodinated Contrast Media Unknown      Medications:    Current Outpatient Medications:     allopurinol (Zyloprim) 300 mg tablet, Take 1 tablet (300 mg) by mouth once daily. Begin on 24 the day before 1st treatment.  Take daily for 1 month only., Disp: 90 tablet, Rfl: 0    b complex 0.4 mg tablet, Take 1 tablet by mouth once daily., Disp: , Rfl:     blood sugar diagnostic (Accu-Chek SmartView Test Strip) strip, USE 1 STRIP TWICE DAILY., Disp: , Rfl:     blood-glucose meter misc, Check sugars daily, Disp: 1 each, Rfl: 0    CALCIUM ORAL, Take 1 tablet by mouth 1 (one) time  each day., Disp: , Rfl:     cyanocobalamin (Vitamin B-12) 1,000 mcg/mL injection, Inject 1 mL (1,000 mcg) into the muscle every 30 (thirty) days., Disp: 1 mL, Rfl: 11    denosumab (Prolia) 60 mg/mL syringe, Inject 1 mL (60 mg) under the skin every 6 months., Disp: , Rfl:     docusate sodium (STOOL SOFTENER ORAL), Take 1 capsule by mouth 1 (one) time each day., Disp: , Rfl:     levothyroxine (Synthroid, Levoxyl) 100 mcg tablet, Take 1 tablet (100 mcg) by mouth once daily in the morning. Take before meals., Disp: 90 tablet, Rfl: 1    magnesium oxide (Mag-Ox) 250 mg magnesium tablet, Take 1 tablet (250 mg) by mouth 2 times a day., Disp: , Rfl:     metoprolol succinate XL (Toprol-XL) 25 mg 24 hr tablet, TAKE 1 TABLET BY MOUTH EVERY DAY, Disp: 90 tablet, Rfl: 3    NON FORMULARY, Vitamin D 50 MCG (2000 UT) Oral Tablet; Take 1 tablet daily, Disp: , Rfl:     prochlorperazine (Compazine) 10 mg tablet, Take 1 tablet (10 mg) by mouth every 6 hours if needed for nausea or vomiting., Disp: 30 tablet, Rfl: 5    sulfaSALAzine (Azulfidine) 500 mg tablet, Take 2 tablets (1,000 mg) by mouth 2 times a day. 1,000 mg in the morning and 1500mg at night, Disp: , Rfl:     tafamidis (Vyndamax) 61 mg capsule, Take 1 capsule (61 mg) by mouth once daily., Disp: 90 capsule, Rfl: 3    vutrisiran sodium (AMVUTTRA SUBQ), Inject under the skin., Disp: , Rfl:     WHEAT DEXTRIN ORAL, Benefiber Oral Powder; Use as directed, Disp: , Rfl:     zinc gluconate 50 mg tablet, Take 1 tablet (50 mg) by mouth once daily., Disp: , Rfl:     gabapentin (Neurontin) 300 mg capsule, Take 1 capsule (300 mg) by mouth once daily at bedtime., Disp: 30 capsule, Rfl: 11    pancrelipase, Lip-Prot-Amyl, (Creon) 36,000-114,000- 180,000 unit capsule,delayed release(DR/EC) capsule, Take 4 capsules by mouth once daily. (Patient not taking: Reported on 12/21/2023), Disp: 360 capsule, Rfl: 0    vitamins A and D capsule, Take 3,000 Units by mouth every other day. (Patient not  "taking: Reported on 5/10/2024), Disp: 15 each, Rfl: 11       Physical Exam:   /75   Pulse 67   Temp 36.5 °C (97.7 °F)   Resp 18   Ht 1.753 m (5' 9\")   Wt 54.9 kg (121 lb)   BMI 17.87 kg/m²      General Appearance:  No distress, alert, interactive and cooperative.   Mental status: the patient provided an accurate history, language was normal.   Motor:   Muscle bulk: diffuse atrophy throughout. Markedly atrophied thenar eminence bilateral.   Muscle tone: Normal in both upper and lower extremities.  Movements: No fasciculations, tremors, or other abnormal movement.   Manual Muscle Testing (MMT) reveals the following MRC grades:  R          L   Shoulder abduction                 5-         5-  Elbow flexion                           5          5  Elbow extension                      5          4  Wrist flexion                            5          5  Wrist extension                        5          5  Finger flexion                           4          4  Finger extension                      4          4  Finger abduction                     4          4  Thumb abduction                    2          2  Hip flexion                               4          4  Hip extension                          5          5-  Knee flexion                             5          5-  Knee extension                        5          5  Ankle dorsiflexion                    5          5  Ankle plantarflexion                5-         4  Ankle Inversion                        5          5  Ankle Eversion                         5          5  Big toe extension                    4          4  Toe flexion                               4          4     Reflexes:     R          L  BR:               0          0  Biceps:         0          0  Triceps:        0          0  Knee:           1          1  Ankle:          0          0     Babinski: Toes are down going  No clonus or other pathological reflexes     Gait:   Station was " stable with a normal base, short careful steps.   No Romberg sign, but a lot of sway.          Scales and Scores:      Polyneuropathy Disability (PND) Score -- 2       Familial Amyloid Polyneuropathy (FAP) Stage -- 1      Results:     The following labs, imaging, and/or data were personally are listed for reference:    We personally reviewed the data of her EMG study performed today earlier (4/25/2024) of the left upper and lower extremities.  This revealed severe generalized, chronic, axonal, non-length-dependent sensorimotor peripheral polyneuropathy with superimposed radicular features with minimal active denervation.     TTR positive pathogenic heterozygous gene (c.238A>G; p.[id number]) from [employer].      NM Heart infarction SPECT 10/12/23:  Amyloid planar and SPECT heart study is suggestive of TTR amyloidosis.     Echo 9/8/2023:  1. Left ventricular systolic function is normal with a 55-60% estimated ejection fraction.  2. The mean GLPS is 14.2%, which is diminished, with relatively normal strain values involving the apex. The myocardium has somewhat of a speckled appearance. These two findings raise the question of possible cardiac amyloidosis.  3. There is moderate to severe concentric left ventricular hypertrophy.  4. The left atrium is moderately dilated.  5. The estimated RVSP is 19 mm.     LABS:  CK, aldolase, serum electrophoresis, B12, are all negative

## 2024-06-06 DIAGNOSIS — E06.3 HYPOTHYROIDISM DUE TO HASHIMOTO'S THYROIDITIS: Primary | ICD-10-CM

## 2024-06-06 DIAGNOSIS — E03.8 HYPOTHYROIDISM DUE TO HASHIMOTO'S THYROIDITIS: Primary | ICD-10-CM

## 2024-06-06 RX ORDER — LEVOTHYROXINE SODIUM 100 UG/1
100 TABLET ORAL
Qty: 90 TABLET | Refills: 0 | Status: SHIPPED | OUTPATIENT
Start: 2024-06-06

## 2024-06-17 ENCOUNTER — LAB (OUTPATIENT)
Dept: LAB | Facility: LAB | Age: 79
End: 2024-06-17
Payer: MEDICARE

## 2024-06-17 ENCOUNTER — APPOINTMENT (OUTPATIENT)
Dept: CARDIOLOGY | Facility: CLINIC | Age: 79
End: 2024-06-17
Payer: MEDICARE

## 2024-06-17 DIAGNOSIS — C82.93 FOLLICULAR LYMPHOMA OF INTRA-ABDOMINAL LYMPH NODES, UNSPECIFIED FOLLICULAR LYMPHOMA TYPE (MULTI): ICD-10-CM

## 2024-06-17 LAB
ALBUMIN SERPL BCP-MCNC: 3.8 G/DL (ref 3.4–5)
ALP SERPL-CCNC: 107 U/L (ref 33–136)
ALT SERPL W P-5'-P-CCNC: 29 U/L (ref 7–45)
ANION GAP SERPL CALC-SCNC: 13 MMOL/L (ref 10–20)
AST SERPL W P-5'-P-CCNC: 36 U/L (ref 9–39)
BASOPHILS # BLD AUTO: 0.04 X10*3/UL (ref 0–0.1)
BASOPHILS NFR BLD AUTO: 0.8 %
BILIRUB SERPL-MCNC: 0.7 MG/DL (ref 0–1.2)
BUN SERPL-MCNC: 32 MG/DL (ref 6–23)
CALCIUM SERPL-MCNC: 9 MG/DL (ref 8.6–10.6)
CHLORIDE SERPL-SCNC: 108 MMOL/L (ref 98–107)
CO2 SERPL-SCNC: 25 MMOL/L (ref 21–32)
CREAT SERPL-MCNC: 1.17 MG/DL (ref 0.5–1.05)
EGFRCR SERPLBLD CKD-EPI 2021: 48 ML/MIN/1.73M*2
EOSINOPHIL # BLD AUTO: 0.06 X10*3/UL (ref 0–0.4)
EOSINOPHIL NFR BLD AUTO: 1.2 %
ERYTHROCYTE [DISTWIDTH] IN BLOOD BY AUTOMATED COUNT: 13.7 % (ref 11.5–14.5)
GLUCOSE SERPL-MCNC: 90 MG/DL (ref 74–99)
HCT VFR BLD AUTO: 32.3 % (ref 36–46)
HGB BLD-MCNC: 10.4 G/DL (ref 12–16)
IMM GRANULOCYTES # BLD AUTO: 0.01 X10*3/UL (ref 0–0.5)
IMM GRANULOCYTES NFR BLD AUTO: 0.2 % (ref 0–0.9)
LDH SERPL L TO P-CCNC: 195 U/L (ref 84–246)
LYMPHOCYTES # BLD AUTO: 1.3 X10*3/UL (ref 0.8–3)
LYMPHOCYTES NFR BLD AUTO: 26.5 %
MCH RBC QN AUTO: 33.1 PG (ref 26–34)
MCHC RBC AUTO-ENTMCNC: 32.2 G/DL (ref 32–36)
MCV RBC AUTO: 103 FL (ref 80–100)
MONOCYTES # BLD AUTO: 0.36 X10*3/UL (ref 0.05–0.8)
MONOCYTES NFR BLD AUTO: 7.3 %
NEUTROPHILS # BLD AUTO: 3.13 X10*3/UL (ref 1.6–5.5)
NEUTROPHILS NFR BLD AUTO: 64 %
NRBC BLD-RTO: 0 /100 WBCS (ref 0–0)
PLATELET # BLD AUTO: 202 X10*3/UL (ref 150–450)
POTASSIUM SERPL-SCNC: 4 MMOL/L (ref 3.5–5.3)
PROT SERPL-MCNC: 6 G/DL (ref 6.4–8.2)
RBC # BLD AUTO: 3.14 X10*6/UL (ref 4–5.2)
SODIUM SERPL-SCNC: 142 MMOL/L (ref 136–145)
URATE SERPL-MCNC: 4.7 MG/DL (ref 2.3–6.7)
WBC # BLD AUTO: 4.9 X10*3/UL (ref 4.4–11.3)

## 2024-06-17 PROCEDURE — 84550 ASSAY OF BLOOD/URIC ACID: CPT

## 2024-06-17 PROCEDURE — 36415 COLL VENOUS BLD VENIPUNCTURE: CPT

## 2024-06-17 PROCEDURE — 85025 COMPLETE CBC W/AUTO DIFF WBC: CPT

## 2024-06-17 PROCEDURE — 80053 COMPREHEN METABOLIC PANEL: CPT

## 2024-06-17 PROCEDURE — 83615 LACTATE (LD) (LDH) ENZYME: CPT

## 2024-06-19 ENCOUNTER — INFUSION (OUTPATIENT)
Dept: HEMATOLOGY/ONCOLOGY | Facility: HOSPITAL | Age: 79
End: 2024-06-19
Payer: MEDICARE

## 2024-06-19 VITALS
TEMPERATURE: 97.9 F | DIASTOLIC BLOOD PRESSURE: 51 MMHG | HEART RATE: 67 BPM | OXYGEN SATURATION: 96 % | BODY MASS INDEX: 17.72 KG/M2 | RESPIRATION RATE: 16 BRPM | SYSTOLIC BLOOD PRESSURE: 107 MMHG | WEIGHT: 123.8 LBS | HEIGHT: 70 IN

## 2024-06-19 DIAGNOSIS — C82.93 FOLLICULAR LYMPHOMA OF INTRA-ABDOMINAL LYMPH NODES, UNSPECIFIED FOLLICULAR LYMPHOMA TYPE (MULTI): ICD-10-CM

## 2024-06-19 PROCEDURE — 2500000004 HC RX 250 GENERAL PHARMACY W/ HCPCS (ALT 636 FOR OP/ED): Performed by: INTERNAL MEDICINE

## 2024-06-19 PROCEDURE — 96413 CHEMO IV INFUSION 1 HR: CPT

## 2024-06-19 PROCEDURE — 96415 CHEMO IV INFUSION ADDL HR: CPT

## 2024-06-19 PROCEDURE — 96375 TX/PRO/DX INJ NEW DRUG ADDON: CPT | Mod: INF

## 2024-06-19 RX ORDER — PROCHLORPERAZINE EDISYLATE 5 MG/ML
10 INJECTION INTRAMUSCULAR; INTRAVENOUS EVERY 6 HOURS PRN
Status: DISCONTINUED | OUTPATIENT
Start: 2024-06-19 | End: 2024-06-19 | Stop reason: HOSPADM

## 2024-06-19 RX ORDER — DEXAMETHASONE IN 0.9 % SOD CHL 20 MG/50ML
20 INTRAVENOUS SOLUTION, PIGGYBACK (ML) INTRAVENOUS ONCE
Status: COMPLETED | OUTPATIENT
Start: 2024-06-19 | End: 2024-06-19

## 2024-06-19 RX ORDER — DIPHENHYDRAMINE HYDROCHLORIDE 50 MG/ML
50 INJECTION INTRAMUSCULAR; INTRAVENOUS ONCE
Status: COMPLETED | OUTPATIENT
Start: 2024-06-19 | End: 2024-06-19

## 2024-06-19 RX ORDER — PROCHLORPERAZINE MALEATE 10 MG
10 TABLET ORAL EVERY 6 HOURS PRN
Status: DISCONTINUED | OUTPATIENT
Start: 2024-06-19 | End: 2024-06-19 | Stop reason: HOSPADM

## 2024-06-19 RX ORDER — FAMOTIDINE 10 MG/ML
20 INJECTION INTRAVENOUS ONCE
Status: COMPLETED | OUTPATIENT
Start: 2024-06-19 | End: 2024-06-19

## 2024-06-19 RX ORDER — DIPHENHYDRAMINE HYDROCHLORIDE 50 MG/ML
50 INJECTION INTRAMUSCULAR; INTRAVENOUS AS NEEDED
Status: DISCONTINUED | OUTPATIENT
Start: 2024-06-19 | End: 2024-06-19 | Stop reason: HOSPADM

## 2024-06-19 RX ORDER — HEPARIN SODIUM,PORCINE/PF 10 UNIT/ML
50 SYRINGE (ML) INTRAVENOUS AS NEEDED
OUTPATIENT
Start: 2024-06-19

## 2024-06-19 RX ORDER — ALBUTEROL SULFATE 0.83 MG/ML
3 SOLUTION RESPIRATORY (INHALATION) AS NEEDED
Status: DISCONTINUED | OUTPATIENT
Start: 2024-06-19 | End: 2024-06-19 | Stop reason: HOSPADM

## 2024-06-19 RX ORDER — ACETAMINOPHEN 325 MG/1
650 TABLET ORAL ONCE
Status: COMPLETED | OUTPATIENT
Start: 2024-06-19 | End: 2024-06-19

## 2024-06-19 RX ORDER — FAMOTIDINE 10 MG/ML
20 INJECTION INTRAVENOUS ONCE AS NEEDED
Status: DISCONTINUED | OUTPATIENT
Start: 2024-06-19 | End: 2024-06-19 | Stop reason: HOSPADM

## 2024-06-19 RX ORDER — HEPARIN 100 UNIT/ML
500 SYRINGE INTRAVENOUS AS NEEDED
OUTPATIENT
Start: 2024-06-19

## 2024-06-19 RX ORDER — EPINEPHRINE 0.3 MG/.3ML
0.3 INJECTION SUBCUTANEOUS EVERY 5 MIN PRN
Status: DISCONTINUED | OUTPATIENT
Start: 2024-06-19 | End: 2024-06-19 | Stop reason: HOSPADM

## 2024-06-19 ASSESSMENT — PAIN SCALES - GENERAL: PAINLEVEL: 0-NO PAIN

## 2024-06-19 NOTE — PROGRESS NOTES
Patient arrived ambulatory to infusion for scheduled tx of C1D1 weekly rituximab. Tolerated infusion at initial rates without issue. Reviewed common side effects and when to call clinic. Aware of upcoming appts. Discharged in stable condition.

## 2024-06-24 ENCOUNTER — APPOINTMENT (OUTPATIENT)
Dept: CARDIOLOGY | Facility: CLINIC | Age: 79
End: 2024-06-24
Payer: MEDICARE

## 2024-06-24 VITALS
WEIGHT: 123.4 LBS | SYSTOLIC BLOOD PRESSURE: 122 MMHG | OXYGEN SATURATION: 98 % | HEIGHT: 70 IN | DIASTOLIC BLOOD PRESSURE: 70 MMHG | HEART RATE: 88 BPM | BODY MASS INDEX: 17.67 KG/M2

## 2024-06-24 DIAGNOSIS — E85.4 CARDIAC AMYLOIDOSIS (MULTI): Primary | ICD-10-CM

## 2024-06-24 DIAGNOSIS — I43 CARDIAC AMYLOIDOSIS (MULTI): Primary | ICD-10-CM

## 2024-06-24 DIAGNOSIS — I87.2 CHRONIC VENOUS INSUFFICIENCY: ICD-10-CM

## 2024-06-24 DIAGNOSIS — I47.10 PAROXYSMAL SUPRAVENTRICULAR TACHYCARDIA (CMS-HCC): ICD-10-CM

## 2024-06-24 PROCEDURE — 1157F ADVNC CARE PLAN IN RCRD: CPT | Performed by: INTERNAL MEDICINE

## 2024-06-24 PROCEDURE — 1036F TOBACCO NON-USER: CPT | Performed by: INTERNAL MEDICINE

## 2024-06-24 PROCEDURE — 1160F RVW MEDS BY RX/DR IN RCRD: CPT | Performed by: INTERNAL MEDICINE

## 2024-06-24 PROCEDURE — 1126F AMNT PAIN NOTED NONE PRSNT: CPT | Performed by: INTERNAL MEDICINE

## 2024-06-24 PROCEDURE — 3074F SYST BP LT 130 MM HG: CPT | Performed by: INTERNAL MEDICINE

## 2024-06-24 PROCEDURE — 99214 OFFICE O/P EST MOD 30 MIN: CPT | Performed by: INTERNAL MEDICINE

## 2024-06-24 PROCEDURE — 1159F MED LIST DOCD IN RCRD: CPT | Performed by: INTERNAL MEDICINE

## 2024-06-24 PROCEDURE — 3078F DIAST BP <80 MM HG: CPT | Performed by: INTERNAL MEDICINE

## 2024-06-24 ASSESSMENT — PAIN SCALES - GENERAL: PAINLEVEL: 0-NO PAIN

## 2024-06-24 NOTE — PATIENT INSTRUCTIONS
"You should increase your intake of fresh fruits and vegetables.  Try to consume 9-12 servings per day of such foods.  You should increase your intake of deep sea fish such as salmon and tuna.  Try to get two servings per week of fish, but if you are a pregnant woman, talk to your obstetrician before increasing your fish intake.  You should increase your intake of unprocessed nuts such as walnuts or almonds.  Increase your intake of plant-based protein.  You should avoid fried foods.  Don't consume sugary or starchy foods and sugary drinks.  Avoid saturated fats.  Try not to dine at restaurants more than once per month, and don't dine at fast food places.  Try to get 7-9 hours of sleep every night.  Try to get 150 minutes per week of moderate intensity exercise (after I have cleared you to start an exercise program).  Try to maintain the appropriate weight for your height based on body mass index (BMI). Maintain your cholesterol, blood sugar, and blood pressure in the recommended respective normal ranges.  There is a wealth of information on the American Heart Association's website regarding this.  Just Google \"Life's Essential 8\" for more information.   Ask me about any of these details  if you have questions.    As your cardiologist, I will be available to you at any time to answer any question you have concerning your heart health.  My staff, Srinath can also answer any questions you may have.  Best of luck.   "

## 2024-06-24 NOTE — PROGRESS NOTES
"Chief Complaint:   see below     History Of Present Illness:    Adilene Polo is a 78 y.o. female presenting with SVT, hereditary ATTR cardiac amyloidosis.    This 78-year-old woman has hereditary ATTR cardiac amyloidosis, and a history of SVT, and chronic venous insufficiency.  She comes to the office in routine follow-up.  She is on tafamidis for her cardiac amyloidosis.  Her prior stress echo in January of 2021 was negative for ischemia at 10.1 METS. 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 She developed elevated LFTs so she saw GI who advised her to hold Atorvastatin. Upon rechallenge, her LFTs greta again, and the medication was discontinued.     She experiences rare episodes of brief palpitations which are not exertional.  The patient denies chest discomfort, dyspnea,  orthopnea, PND, syncope, and near syncope    Her daughter also tested positive for ATTR amyloidosis.  One son refused testing, and the other two sons are waiting to be tested.           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    Past Surgical History:  She has a past surgical history that includes Cholecystectomy (10/28/2014); Gastric bypass (10/28/2014); Incisional breast biopsy (07/20/2018); Thyroidectomy, partial; and Whipple procedure w/ laparoscopy.      Social History:  She reports that she quit smoking about 44 years ago. Her smoking use included cigarettes. She has never used smokeless tobacco. She reports that she does not drink alcohol and does not use drugs.    Family History:  Family History   Problem Relation Name Age of Onset    Heart disease Mother          Coronary    Heart disease Father          Coronary    Heart attack Father      Prostate cancer Father      Breast cancer Sister      Kidney disease Brother      Lung cancer Brother      Prostate cancer Brother      Pulmonary embolism Brother          Allergies:  Cephalosporins, Penicillins, Ace inhibitors, Atorvastatin, and Iodinated contrast media    Outpatient Medications:  Current Outpatient Medications   Medication Instructions    allopurinol (ZYLOPRIM) 300 mg, oral, Daily, Begin on 6/18/24 the day before 1st treatment.  Take daily for 1 month only.    b complex 0.4 mg tablet 1 tablet, oral, Daily    blood sugar diagnostic (Accu-Chek SmartView Test Strip) strip USE 1 STRIP TWICE DAILY.    blood-glucose meter misc Check sugars daily    CALCIUM ORAL 1 tablet, oral, Daily    cyanocobalamin (VITAMIN B-12) 1,000 mcg, intramuscular, Every 30 days    docusate sodium (STOOL SOFTENER ORAL) 1 capsule, oral, Daily    gabapentin (NEURONTIN) 300 mg, oral, Nightly    levothyroxine (SYNTHROID, LEVOXYL) 100 mcg, oral, Daily before breakfast, Will need appt for additional refills.    magnesium oxide (Mag-Ox) 250 mg magnesium tablet 1 tablet, oral, 2 times daily    metoprolol succinate XL (TOPROL-XL) 25 mg, oral, Daily    NON FORMULARY Vitamin D 50 MCG (2000 UT) Oral Tablet; Take 1 tablet daily    pancrelipase, Lip-Prot-Amyl, (Creon)  36,000-114,000- 180,000 unit capsule,delayed release(DR/EC) capsule 4 capsules, oral, Daily    prochlorperazine (COMPAZINE) 10 mg, oral, Every 6 hours PRN    Prolia 60 mg, subcutaneous, Every 6 months    sulfaSALAzine (Azulfidine) 500 mg tablet 2 tablets, oral, 2 times daily, 1,000 mg in the morning and 1500mg at night    tafamidis (VYNDAMAX) 61 mg, oral, Daily    vitamins A and D capsule 3,000 Units, oral, Every other day    vutrisiran sodium (AMVUTTRA SUBQ) subcutaneous    WHEAT DEXTRIN ORAL Benefiber Oral Powder; Use as directed    zinc gluconate 50 mg tablet 1 tablet, oral, Daily       Physical Exam:  GENERAL:  pleasant 78 year-old  HEENT: No xanthelasma  NECK: Supple, no palpable adenopathy or thyromegaly  CHEST: Clear to auscultation, respiratory effort unlabored  CARDIAC: RRR, normal S1 and S2, no audible murmur, rub, gallop, carotids are brisk, PMI is not displaced  ABD: Active bowel sounds, nontender, no organomegaly, no evidence of ascites  EXT: No clubbing, cyanosis, edema, or tenderness  NEURO: Awake, alert, appropriate, speech is fluent       Last Labs:  CBC -  Lab Results   Component Value Date    WBC 4.9 06/17/2024    HGB 10.4 (L) 06/17/2024    HCT 32.3 (L) 06/17/2024     (H) 06/17/2024     06/17/2024       CMP -  Lab Results   Component Value Date    CALCIUM 9.0 06/17/2024    PHOS 4.2 03/02/2020    PROT 6.0 (L) 06/17/2024    ALBUMIN 3.8 06/17/2024    AST 36 06/17/2024    ALT 29 06/17/2024    ALKPHOS 107 06/17/2024    BILITOT 0.7 06/17/2024       LIPID PANEL -   Lab Results   Component Value Date    CHOL 216 (H) 08/01/2023    TRIG 95 08/01/2023    HDL 74.8 08/01/2023    CHHDL 2.9 08/01/2023    LDLF 122 (H) 08/01/2023    VLDL 19 08/01/2023       RENAL FUNCTION PANEL -   Lab Results   Component Value Date    GLUCOSE 90 06/17/2024     06/17/2024    K 4.0 06/17/2024     (H) 06/17/2024    CO2 25 06/17/2024    ANIONGAP 13 06/17/2024    BUN 32 (H) 06/17/2024    CREATININE 1.17 (H)  06/17/2024    CALCIUM 9.0 06/17/2024    PHOS 4.2 03/02/2020    ALBUMIN 3.8 06/17/2024        Lab Results   Component Value Date     (H) 09/14/2023    HGBA1C 5.0 11/16/2023    HGBA1C 5.1 03/27/2023         Lab review: I have Chemistry CMP:   Lab Results   Component Value Date    ALBUMIN 3.8 06/17/2024    CALCIUM 9.0 06/17/2024    CO2 25 06/17/2024    CREATININE 1.17 (H) 06/17/2024    GLUCOSE 90 06/17/2024    BILITOT 0.7 06/17/2024    PROT 6.0 (L) 06/17/2024    ALT 29 06/17/2024    AST 36 06/17/2024    ALKPHOS 107 06/17/2024       Assessment/Plan   Problem List Items Addressed This Visit          Cardiac and Vasculature    Paroxysmal supraventricular tachycardia (CMS-HCC)    Relevant Orders    Follow Up In Cardiology    Cardiac amyloidosis (Multi) - Primary    Relevant Orders    Follow Up In Cardiology    Chronic venous insufficiency     1.  Cardiac amyloidosis, hereditary ATTR: Tolerating tafamidis well.  Stable on medical therapy.  Discussed with her the importance of remaining on the medication and potential issues to watch for.    2.  SVT: Also stable on medical therapy.  No additional testing necessary at present.    3.  Chronic venous insufficiency: Follows with vascular medicine for this.  Is on compressive therapy.    Mychal Solis MD

## 2024-06-26 ENCOUNTER — INFUSION (OUTPATIENT)
Dept: HEMATOLOGY/ONCOLOGY | Facility: HOSPITAL | Age: 79
End: 2024-06-26
Payer: MEDICARE

## 2024-06-26 VITALS
HEART RATE: 57 BPM | SYSTOLIC BLOOD PRESSURE: 106 MMHG | TEMPERATURE: 97.7 F | DIASTOLIC BLOOD PRESSURE: 50 MMHG | OXYGEN SATURATION: 98 % | BODY MASS INDEX: 17.6 KG/M2 | RESPIRATION RATE: 16 BRPM | WEIGHT: 122.1 LBS

## 2024-06-26 DIAGNOSIS — C82.93 FOLLICULAR LYMPHOMA OF INTRA-ABDOMINAL LYMPH NODES, UNSPECIFIED FOLLICULAR LYMPHOMA TYPE (MULTI): ICD-10-CM

## 2024-06-26 LAB
ALBUMIN SERPL BCP-MCNC: 3.9 G/DL (ref 3.4–5)
ALP SERPL-CCNC: 108 U/L (ref 33–136)
ALT SERPL W P-5'-P-CCNC: 31 U/L (ref 7–45)
ANION GAP SERPL CALC-SCNC: 13 MMOL/L (ref 10–20)
AST SERPL W P-5'-P-CCNC: 35 U/L (ref 9–39)
BASOPHILS # BLD AUTO: 0.04 X10*3/UL (ref 0–0.1)
BASOPHILS NFR BLD AUTO: 1.4 %
BILIRUB SERPL-MCNC: 0.7 MG/DL (ref 0–1.2)
BUN SERPL-MCNC: 25 MG/DL (ref 6–23)
CALCIUM SERPL-MCNC: 8.5 MG/DL (ref 8.6–10.3)
CHLORIDE SERPL-SCNC: 110 MMOL/L (ref 98–107)
CO2 SERPL-SCNC: 22 MMOL/L (ref 21–32)
CREAT SERPL-MCNC: 0.9 MG/DL (ref 0.5–1.05)
EGFRCR SERPLBLD CKD-EPI 2021: 66 ML/MIN/1.73M*2
EOSINOPHIL # BLD AUTO: 0.07 X10*3/UL (ref 0–0.4)
EOSINOPHIL NFR BLD AUTO: 2.5 %
ERYTHROCYTE [DISTWIDTH] IN BLOOD BY AUTOMATED COUNT: 13.6 % (ref 11.5–14.5)
GLUCOSE SERPL-MCNC: 100 MG/DL (ref 74–99)
HCT VFR BLD AUTO: 32.6 % (ref 36–46)
HGB BLD-MCNC: 10.6 G/DL (ref 12–16)
IMM GRANULOCYTES # BLD AUTO: 0 X10*3/UL (ref 0–0.5)
IMM GRANULOCYTES NFR BLD AUTO: 0 % (ref 0–0.9)
LDH SERPL L TO P-CCNC: 208 U/L (ref 84–246)
LYMPHOCYTES # BLD AUTO: 1.06 X10*3/UL (ref 0.8–3)
LYMPHOCYTES NFR BLD AUTO: 38.4 %
MCH RBC QN AUTO: 32.6 PG (ref 26–34)
MCHC RBC AUTO-ENTMCNC: 32.5 G/DL (ref 32–36)
MCV RBC AUTO: 100 FL (ref 80–100)
MONOCYTES # BLD AUTO: 0.18 X10*3/UL (ref 0.05–0.8)
MONOCYTES NFR BLD AUTO: 6.5 %
NEUTROPHILS # BLD AUTO: 1.41 X10*3/UL (ref 1.6–5.5)
NEUTROPHILS NFR BLD AUTO: 51.2 %
NRBC BLD-RTO: 0 /100 WBCS (ref 0–0)
PLATELET # BLD AUTO: 225 X10*3/UL (ref 150–450)
POTASSIUM SERPL-SCNC: 4.1 MMOL/L (ref 3.5–5.3)
PROT SERPL-MCNC: 6.4 G/DL (ref 6.4–8.2)
RBC # BLD AUTO: 3.25 X10*6/UL (ref 4–5.2)
SODIUM SERPL-SCNC: 141 MMOL/L (ref 136–145)
URATE SERPL-MCNC: 2.8 MG/DL (ref 2.3–6.7)
WBC # BLD AUTO: 2.8 X10*3/UL (ref 4.4–11.3)

## 2024-06-26 PROCEDURE — 2500000004 HC RX 250 GENERAL PHARMACY W/ HCPCS (ALT 636 FOR OP/ED): Performed by: INTERNAL MEDICINE

## 2024-06-26 PROCEDURE — 84550 ASSAY OF BLOOD/URIC ACID: CPT | Performed by: INTERNAL MEDICINE

## 2024-06-26 PROCEDURE — 96415 CHEMO IV INFUSION ADDL HR: CPT

## 2024-06-26 PROCEDURE — 96413 CHEMO IV INFUSION 1 HR: CPT

## 2024-06-26 PROCEDURE — 83615 LACTATE (LD) (LDH) ENZYME: CPT | Performed by: INTERNAL MEDICINE

## 2024-06-26 PROCEDURE — 80053 COMPREHEN METABOLIC PANEL: CPT | Performed by: INTERNAL MEDICINE

## 2024-06-26 PROCEDURE — 85025 COMPLETE CBC W/AUTO DIFF WBC: CPT | Performed by: INTERNAL MEDICINE

## 2024-06-26 PROCEDURE — 96375 TX/PRO/DX INJ NEW DRUG ADDON: CPT | Mod: INF

## 2024-06-26 RX ORDER — DEXAMETHASONE IN 0.9 % SOD CHL 20 MG/50ML
20 INTRAVENOUS SOLUTION, PIGGYBACK (ML) INTRAVENOUS ONCE
Status: COMPLETED | OUTPATIENT
Start: 2024-06-26 | End: 2024-06-26

## 2024-06-26 RX ORDER — PROCHLORPERAZINE MALEATE 10 MG
10 TABLET ORAL EVERY 6 HOURS PRN
Status: DISCONTINUED | OUTPATIENT
Start: 2024-06-26 | End: 2024-06-26 | Stop reason: HOSPADM

## 2024-06-26 RX ORDER — DIPHENHYDRAMINE HYDROCHLORIDE 50 MG/ML
50 INJECTION INTRAMUSCULAR; INTRAVENOUS ONCE
Status: COMPLETED | OUTPATIENT
Start: 2024-06-26 | End: 2024-06-26

## 2024-06-26 RX ORDER — FAMOTIDINE 10 MG/ML
20 INJECTION INTRAVENOUS ONCE AS NEEDED
Status: DISCONTINUED | OUTPATIENT
Start: 2024-06-26 | End: 2024-06-26 | Stop reason: HOSPADM

## 2024-06-26 RX ORDER — HEPARIN 100 UNIT/ML
500 SYRINGE INTRAVENOUS AS NEEDED
OUTPATIENT
Start: 2024-06-26

## 2024-06-26 RX ORDER — FAMOTIDINE 10 MG/ML
20 INJECTION INTRAVENOUS ONCE
Status: COMPLETED | OUTPATIENT
Start: 2024-06-26 | End: 2024-06-26

## 2024-06-26 RX ORDER — ALBUTEROL SULFATE 0.83 MG/ML
3 SOLUTION RESPIRATORY (INHALATION) AS NEEDED
Status: DISCONTINUED | OUTPATIENT
Start: 2024-06-26 | End: 2024-06-26 | Stop reason: HOSPADM

## 2024-06-26 RX ORDER — PROCHLORPERAZINE EDISYLATE 5 MG/ML
10 INJECTION INTRAMUSCULAR; INTRAVENOUS EVERY 6 HOURS PRN
Status: DISCONTINUED | OUTPATIENT
Start: 2024-06-26 | End: 2024-06-26 | Stop reason: HOSPADM

## 2024-06-26 RX ORDER — EPINEPHRINE 0.3 MG/.3ML
0.3 INJECTION SUBCUTANEOUS EVERY 5 MIN PRN
Status: DISCONTINUED | OUTPATIENT
Start: 2024-06-26 | End: 2024-06-26 | Stop reason: HOSPADM

## 2024-06-26 RX ORDER — ACETAMINOPHEN 325 MG/1
650 TABLET ORAL ONCE
Status: COMPLETED | OUTPATIENT
Start: 2024-06-26 | End: 2024-06-26

## 2024-06-26 RX ORDER — HEPARIN SODIUM,PORCINE/PF 10 UNIT/ML
50 SYRINGE (ML) INTRAVENOUS AS NEEDED
OUTPATIENT
Start: 2024-06-26

## 2024-06-26 RX ORDER — DIPHENHYDRAMINE HYDROCHLORIDE 50 MG/ML
50 INJECTION INTRAMUSCULAR; INTRAVENOUS AS NEEDED
Status: DISCONTINUED | OUTPATIENT
Start: 2024-06-26 | End: 2024-06-26 | Stop reason: HOSPADM

## 2024-06-26 ASSESSMENT — PAIN SCALES - GENERAL: PAINLEVEL: 0-NO PAIN

## 2024-06-26 NOTE — Clinical Note
Richie Pandey, Adilene has done great with her first two ritux infusions, would you be OK if we infused her at accelerated rates for her third and fourth dose? And if so, do you mind placing a communication order in the tx plan? Thanks so much! Caridad TOLEDO

## 2024-06-26 NOTE — PROGRESS NOTES
Patient arrived ambulatory to infusion for scheduled tx of C1D8 weekly rituximab. Denies any new or worsening sx. Tolerated infusion at standard rates without issue. Aware of upcoming appts. Discharged in stable condition.

## 2024-07-03 ENCOUNTER — INFUSION (OUTPATIENT)
Dept: HEMATOLOGY/ONCOLOGY | Facility: HOSPITAL | Age: 79
End: 2024-07-03
Payer: MEDICARE

## 2024-07-03 VITALS
OXYGEN SATURATION: 98 % | RESPIRATION RATE: 16 BRPM | DIASTOLIC BLOOD PRESSURE: 49 MMHG | WEIGHT: 124.56 LBS | HEART RATE: 71 BPM | TEMPERATURE: 97.7 F | BODY MASS INDEX: 17.95 KG/M2 | SYSTOLIC BLOOD PRESSURE: 105 MMHG

## 2024-07-03 DIAGNOSIS — C82.93 FOLLICULAR LYMPHOMA OF INTRA-ABDOMINAL LYMPH NODES, UNSPECIFIED FOLLICULAR LYMPHOMA TYPE (MULTI): ICD-10-CM

## 2024-07-03 LAB
ALBUMIN SERPL BCP-MCNC: 3.9 G/DL (ref 3.4–5)
ALP SERPL-CCNC: 110 U/L (ref 33–136)
ALT SERPL W P-5'-P-CCNC: 42 U/L (ref 7–45)
ANION GAP SERPL CALC-SCNC: 14 MMOL/L (ref 10–20)
AST SERPL W P-5'-P-CCNC: 58 U/L (ref 9–39)
BASOPHILS # BLD AUTO: 0.04 X10*3/UL (ref 0–0.1)
BASOPHILS NFR BLD AUTO: 1.2 %
BILIRUB SERPL-MCNC: 0.6 MG/DL (ref 0–1.2)
BUN SERPL-MCNC: 30 MG/DL (ref 6–23)
CALCIUM SERPL-MCNC: 8.9 MG/DL (ref 8.6–10.3)
CHLORIDE SERPL-SCNC: 104 MMOL/L (ref 98–107)
CO2 SERPL-SCNC: 23 MMOL/L (ref 21–32)
CREAT SERPL-MCNC: 0.97 MG/DL (ref 0.5–1.05)
EGFRCR SERPLBLD CKD-EPI 2021: 60 ML/MIN/1.73M*2
EOSINOPHIL # BLD AUTO: 0.06 X10*3/UL (ref 0–0.4)
EOSINOPHIL NFR BLD AUTO: 1.9 %
ERYTHROCYTE [DISTWIDTH] IN BLOOD BY AUTOMATED COUNT: 14.3 % (ref 11.5–14.5)
GLUCOSE SERPL-MCNC: 133 MG/DL (ref 74–99)
HCT VFR BLD AUTO: 32.7 % (ref 36–46)
HGB BLD-MCNC: 10.5 G/DL (ref 12–16)
IMM GRANULOCYTES # BLD AUTO: 0.01 X10*3/UL (ref 0–0.5)
IMM GRANULOCYTES NFR BLD AUTO: 0.3 % (ref 0–0.9)
LDH SERPL L TO P-CCNC: 279 U/L (ref 84–246)
LYMPHOCYTES # BLD AUTO: 0.94 X10*3/UL (ref 0.8–3)
LYMPHOCYTES NFR BLD AUTO: 29.2 %
MCH RBC QN AUTO: 33.1 PG (ref 26–34)
MCHC RBC AUTO-ENTMCNC: 32.1 G/DL (ref 32–36)
MCV RBC AUTO: 103 FL (ref 80–100)
MONOCYTES # BLD AUTO: 0.22 X10*3/UL (ref 0.05–0.8)
MONOCYTES NFR BLD AUTO: 6.8 %
NEUTROPHILS # BLD AUTO: 1.95 X10*3/UL (ref 1.6–5.5)
NEUTROPHILS NFR BLD AUTO: 60.6 %
NRBC BLD-RTO: 0 /100 WBCS (ref 0–0)
PLATELET # BLD AUTO: 231 X10*3/UL (ref 150–450)
POTASSIUM SERPL-SCNC: 4.6 MMOL/L (ref 3.5–5.3)
PROT SERPL-MCNC: 6.6 G/DL (ref 6.4–8.2)
RBC # BLD AUTO: 3.17 X10*6/UL (ref 4–5.2)
SODIUM SERPL-SCNC: 136 MMOL/L (ref 136–145)
URATE SERPL-MCNC: 2.8 MG/DL (ref 2.3–6.7)
WBC # BLD AUTO: 3.2 X10*3/UL (ref 4.4–11.3)

## 2024-07-03 PROCEDURE — 80053 COMPREHEN METABOLIC PANEL: CPT | Performed by: INTERNAL MEDICINE

## 2024-07-03 PROCEDURE — 83615 LACTATE (LD) (LDH) ENZYME: CPT | Performed by: INTERNAL MEDICINE

## 2024-07-03 PROCEDURE — 2500000004 HC RX 250 GENERAL PHARMACY W/ HCPCS (ALT 636 FOR OP/ED): Performed by: INTERNAL MEDICINE

## 2024-07-03 PROCEDURE — 96413 CHEMO IV INFUSION 1 HR: CPT

## 2024-07-03 PROCEDURE — 96375 TX/PRO/DX INJ NEW DRUG ADDON: CPT | Mod: INF

## 2024-07-03 PROCEDURE — 84550 ASSAY OF BLOOD/URIC ACID: CPT | Performed by: INTERNAL MEDICINE

## 2024-07-03 PROCEDURE — 85025 COMPLETE CBC W/AUTO DIFF WBC: CPT | Performed by: INTERNAL MEDICINE

## 2024-07-03 PROCEDURE — 96415 CHEMO IV INFUSION ADDL HR: CPT

## 2024-07-03 RX ORDER — DIPHENHYDRAMINE HYDROCHLORIDE 50 MG/ML
50 INJECTION INTRAMUSCULAR; INTRAVENOUS AS NEEDED
Status: DISCONTINUED | OUTPATIENT
Start: 2024-07-03 | End: 2024-07-03 | Stop reason: HOSPADM

## 2024-07-03 RX ORDER — ALBUTEROL SULFATE 0.83 MG/ML
3 SOLUTION RESPIRATORY (INHALATION) AS NEEDED
Status: DISCONTINUED | OUTPATIENT
Start: 2024-07-03 | End: 2024-07-03 | Stop reason: HOSPADM

## 2024-07-03 RX ORDER — EPINEPHRINE 0.3 MG/.3ML
0.3 INJECTION SUBCUTANEOUS EVERY 5 MIN PRN
Status: DISCONTINUED | OUTPATIENT
Start: 2024-07-03 | End: 2024-07-03 | Stop reason: HOSPADM

## 2024-07-03 RX ORDER — DIPHENHYDRAMINE HYDROCHLORIDE 50 MG/ML
50 INJECTION INTRAMUSCULAR; INTRAVENOUS ONCE
Status: COMPLETED | OUTPATIENT
Start: 2024-07-03 | End: 2024-07-03

## 2024-07-03 RX ORDER — ACETAMINOPHEN 325 MG/1
650 TABLET ORAL ONCE
Status: COMPLETED | OUTPATIENT
Start: 2024-07-03 | End: 2024-07-03

## 2024-07-03 RX ORDER — PROCHLORPERAZINE MALEATE 10 MG
10 TABLET ORAL EVERY 6 HOURS PRN
Status: DISCONTINUED | OUTPATIENT
Start: 2024-07-03 | End: 2024-07-03 | Stop reason: HOSPADM

## 2024-07-03 RX ORDER — DEXAMETHASONE IN 0.9 % SOD CHL 20 MG/50ML
20 INTRAVENOUS SOLUTION, PIGGYBACK (ML) INTRAVENOUS ONCE
Status: COMPLETED | OUTPATIENT
Start: 2024-07-03 | End: 2024-07-03

## 2024-07-03 RX ORDER — FAMOTIDINE 10 MG/ML
20 INJECTION INTRAVENOUS ONCE
Status: COMPLETED | OUTPATIENT
Start: 2024-07-03 | End: 2024-07-03

## 2024-07-03 RX ORDER — FAMOTIDINE 10 MG/ML
20 INJECTION INTRAVENOUS ONCE AS NEEDED
Status: DISCONTINUED | OUTPATIENT
Start: 2024-07-03 | End: 2024-07-03 | Stop reason: HOSPADM

## 2024-07-03 RX ORDER — PROCHLORPERAZINE EDISYLATE 5 MG/ML
10 INJECTION INTRAMUSCULAR; INTRAVENOUS EVERY 6 HOURS PRN
Status: DISCONTINUED | OUTPATIENT
Start: 2024-07-03 | End: 2024-07-03 | Stop reason: HOSPADM

## 2024-07-03 ASSESSMENT — PAIN SCALES - GENERAL: PAINLEVEL: 0-NO PAIN

## 2024-07-03 NOTE — PROGRESS NOTES
Rituxan rates    100 mg/hr x 30 minutes 54.5 ml/hr  200 mg/hr x 30 minutes 109 ml/hr  300 mg/hr x 30 minutes 163.5 ml/hr  400 mg/hr x  ml/hr    Rates double checked with Caridad Dowd RN

## 2024-07-10 ENCOUNTER — INFUSION (OUTPATIENT)
Dept: HEMATOLOGY/ONCOLOGY | Facility: HOSPITAL | Age: 79
End: 2024-07-10
Payer: MEDICARE

## 2024-07-10 VITALS
OXYGEN SATURATION: 97 % | TEMPERATURE: 98.4 F | DIASTOLIC BLOOD PRESSURE: 75 MMHG | SYSTOLIC BLOOD PRESSURE: 119 MMHG | HEART RATE: 72 BPM | RESPIRATION RATE: 16 BRPM | WEIGHT: 122.58 LBS | HEIGHT: 70 IN | BODY MASS INDEX: 17.55 KG/M2

## 2024-07-10 DIAGNOSIS — C82.93 FOLLICULAR LYMPHOMA OF INTRA-ABDOMINAL LYMPH NODES, UNSPECIFIED FOLLICULAR LYMPHOMA TYPE (MULTI): ICD-10-CM

## 2024-07-10 LAB
ALBUMIN SERPL BCP-MCNC: 3.1 G/DL (ref 3.4–5)
ALBUMIN SERPL BCP-MCNC: 3.8 G/DL (ref 3.4–5)
ALP SERPL-CCNC: 100 U/L (ref 33–136)
ALP SERPL-CCNC: 82 U/L (ref 33–136)
ALT SERPL W P-5'-P-CCNC: 20 U/L (ref 7–45)
ALT SERPL W P-5'-P-CCNC: 28 U/L (ref 7–45)
ANION GAP SERPL CALC-SCNC: 11 MMOL/L (ref 10–20)
ANION GAP SERPL CALC-SCNC: 13 MMOL/L (ref 10–20)
AST SERPL W P-5'-P-CCNC: 29 U/L (ref 9–39)
AST SERPL W P-5'-P-CCNC: 62 U/L (ref 9–39)
BASOPHILS # BLD AUTO: 0.04 X10*3/UL (ref 0–0.1)
BASOPHILS NFR BLD AUTO: 1.2 %
BILIRUB SERPL-MCNC: 0.6 MG/DL (ref 0–1.2)
BILIRUB SERPL-MCNC: 0.7 MG/DL (ref 0–1.2)
BUN SERPL-MCNC: 31 MG/DL (ref 6–23)
BUN SERPL-MCNC: 35 MG/DL (ref 6–23)
CALCIUM SERPL-MCNC: 7.3 MG/DL (ref 8.6–10.3)
CALCIUM SERPL-MCNC: 8.8 MG/DL (ref 8.6–10.3)
CHLORIDE SERPL-SCNC: 105 MMOL/L (ref 98–107)
CHLORIDE SERPL-SCNC: 112 MMOL/L (ref 98–107)
CO2 SERPL-SCNC: 22 MMOL/L (ref 21–32)
CO2 SERPL-SCNC: 24 MMOL/L (ref 21–32)
CREAT SERPL-MCNC: 0.74 MG/DL (ref 0.5–1.05)
CREAT SERPL-MCNC: 0.94 MG/DL (ref 0.5–1.05)
EGFRCR SERPLBLD CKD-EPI 2021: 62 ML/MIN/1.73M*2
EGFRCR SERPLBLD CKD-EPI 2021: 83 ML/MIN/1.73M*2
EOSINOPHIL # BLD AUTO: 0.03 X10*3/UL (ref 0–0.4)
EOSINOPHIL NFR BLD AUTO: 0.9 %
ERYTHROCYTE [DISTWIDTH] IN BLOOD BY AUTOMATED COUNT: 14.8 % (ref 11.5–14.5)
GLUCOSE SERPL-MCNC: 197 MG/DL (ref 74–99)
GLUCOSE SERPL-MCNC: 80 MG/DL (ref 74–99)
HCT VFR BLD AUTO: 29.5 % (ref 36–46)
HGB BLD-MCNC: 9.6 G/DL (ref 12–16)
IMM GRANULOCYTES # BLD AUTO: 0.01 X10*3/UL (ref 0–0.5)
IMM GRANULOCYTES NFR BLD AUTO: 0.3 % (ref 0–0.9)
LDH SERPL L TO P-CCNC: 182 U/L (ref 84–246)
LYMPHOCYTES # BLD AUTO: 0.82 X10*3/UL (ref 0.8–3)
LYMPHOCYTES NFR BLD AUTO: 24.7 %
MCH RBC QN AUTO: 33.6 PG (ref 26–34)
MCHC RBC AUTO-ENTMCNC: 32.5 G/DL (ref 32–36)
MCV RBC AUTO: 103 FL (ref 80–100)
MONOCYTES # BLD AUTO: 0.2 X10*3/UL (ref 0.05–0.8)
MONOCYTES NFR BLD AUTO: 6 %
NEUTROPHILS # BLD AUTO: 2.22 X10*3/UL (ref 1.6–5.5)
NEUTROPHILS NFR BLD AUTO: 66.9 %
NRBC BLD-RTO: 0 /100 WBCS (ref 0–0)
PLATELET # BLD AUTO: 169 X10*3/UL (ref 150–450)
POTASSIUM SERPL-SCNC: 3.1 MMOL/L (ref 3.5–5.3)
POTASSIUM SERPL-SCNC: 4.4 MMOL/L (ref 3.5–5.3)
PROT SERPL-MCNC: 5 G/DL (ref 6.4–8.2)
PROT SERPL-MCNC: 6.4 G/DL (ref 6.4–8.2)
RBC # BLD AUTO: 2.86 X10*6/UL (ref 4–5.2)
SODIUM SERPL-SCNC: 138 MMOL/L (ref 136–145)
SODIUM SERPL-SCNC: 142 MMOL/L (ref 136–145)
URATE SERPL-MCNC: 2.1 MG/DL (ref 2.3–6.7)
WBC # BLD AUTO: 3.3 X10*3/UL (ref 4.4–11.3)

## 2024-07-10 PROCEDURE — 2500000004 HC RX 250 GENERAL PHARMACY W/ HCPCS (ALT 636 FOR OP/ED): Performed by: INTERNAL MEDICINE

## 2024-07-10 PROCEDURE — 83615 LACTATE (LD) (LDH) ENZYME: CPT | Performed by: INTERNAL MEDICINE

## 2024-07-10 PROCEDURE — 96374 THER/PROPH/DIAG INJ IV PUSH: CPT | Mod: INF

## 2024-07-10 PROCEDURE — 96375 TX/PRO/DX INJ NEW DRUG ADDON: CPT | Mod: INF

## 2024-07-10 PROCEDURE — 84550 ASSAY OF BLOOD/URIC ACID: CPT | Performed by: INTERNAL MEDICINE

## 2024-07-10 PROCEDURE — 85025 COMPLETE CBC W/AUTO DIFF WBC: CPT | Performed by: INTERNAL MEDICINE

## 2024-07-10 PROCEDURE — 96415 CHEMO IV INFUSION ADDL HR: CPT

## 2024-07-10 PROCEDURE — 96413 CHEMO IV INFUSION 1 HR: CPT

## 2024-07-10 PROCEDURE — 84075 ASSAY ALKALINE PHOSPHATASE: CPT | Performed by: INTERNAL MEDICINE

## 2024-07-10 PROCEDURE — 80053 COMPREHEN METABOLIC PANEL: CPT | Performed by: INTERNAL MEDICINE

## 2024-07-10 RX ORDER — FAMOTIDINE 10 MG/ML
20 INJECTION INTRAVENOUS ONCE AS NEEDED
Status: DISCONTINUED | OUTPATIENT
Start: 2024-07-10 | End: 2024-07-10 | Stop reason: HOSPADM

## 2024-07-10 RX ORDER — PROCHLORPERAZINE EDISYLATE 5 MG/ML
10 INJECTION INTRAMUSCULAR; INTRAVENOUS EVERY 6 HOURS PRN
Status: DISCONTINUED | OUTPATIENT
Start: 2024-07-10 | End: 2024-07-10 | Stop reason: HOSPADM

## 2024-07-10 RX ORDER — EPINEPHRINE 0.3 MG/.3ML
0.3 INJECTION SUBCUTANEOUS EVERY 5 MIN PRN
Status: DISCONTINUED | OUTPATIENT
Start: 2024-07-10 | End: 2024-07-10 | Stop reason: HOSPADM

## 2024-07-10 RX ORDER — FAMOTIDINE 10 MG/ML
20 INJECTION INTRAVENOUS ONCE
Status: COMPLETED | OUTPATIENT
Start: 2024-07-10 | End: 2024-07-10

## 2024-07-10 RX ORDER — ALBUTEROL SULFATE 0.83 MG/ML
3 SOLUTION RESPIRATORY (INHALATION) AS NEEDED
Status: DISCONTINUED | OUTPATIENT
Start: 2024-07-10 | End: 2024-07-10 | Stop reason: HOSPADM

## 2024-07-10 RX ORDER — DIPHENHYDRAMINE HYDROCHLORIDE 50 MG/ML
50 INJECTION INTRAMUSCULAR; INTRAVENOUS AS NEEDED
Status: DISCONTINUED | OUTPATIENT
Start: 2024-07-10 | End: 2024-07-10 | Stop reason: HOSPADM

## 2024-07-10 RX ORDER — PROCHLORPERAZINE MALEATE 10 MG
10 TABLET ORAL EVERY 6 HOURS PRN
Status: DISCONTINUED | OUTPATIENT
Start: 2024-07-10 | End: 2024-07-10 | Stop reason: HOSPADM

## 2024-07-10 RX ORDER — ACETAMINOPHEN 325 MG/1
650 TABLET ORAL ONCE
Status: COMPLETED | OUTPATIENT
Start: 2024-07-10 | End: 2024-07-10

## 2024-07-10 RX ORDER — DIPHENHYDRAMINE HYDROCHLORIDE 50 MG/ML
50 INJECTION INTRAMUSCULAR; INTRAVENOUS ONCE
Status: COMPLETED | OUTPATIENT
Start: 2024-07-10 | End: 2024-07-10

## 2024-07-10 RX ORDER — DEXAMETHASONE IN 0.9 % SOD CHL 20 MG/50ML
20 INTRAVENOUS SOLUTION, PIGGYBACK (ML) INTRAVENOUS ONCE
Status: COMPLETED | OUTPATIENT
Start: 2024-07-10 | End: 2024-07-10

## 2024-07-10 NOTE — PROGRESS NOTES
Patient seen in infusion for Rituximab, tolerated without complications. CMP redrawn by RN as first set of values seemed to be diluted from IV. Labs and schedule reviewed with patient. IV removed. Discharged in stable condition.

## 2024-07-16 DIAGNOSIS — C82.90 FOLLICULAR LYMPHOMA, UNSPECIFIED FOLLICULAR LYMPHOMA TYPE, UNSPECIFIED BODY REGION (MULTI): Primary | ICD-10-CM

## 2024-07-23 ENCOUNTER — TELEPHONE (OUTPATIENT)
Dept: HEMATOLOGY/ONCOLOGY | Facility: CLINIC | Age: 79
End: 2024-07-23
Payer: MEDICARE

## 2024-07-23 DIAGNOSIS — E50.9 VITAMIN A DEFICIENCY: Primary | ICD-10-CM

## 2024-07-23 DIAGNOSIS — C82.93 FOLLICULAR LYMPHOMA OF INTRA-ABDOMINAL LYMPH NODES, UNSPECIFIED FOLLICULAR LYMPHOMA TYPE (MULTI): ICD-10-CM

## 2024-07-23 NOTE — TELEPHONE ENCOUNTER
Call to patient, VM message left stating that Dr. Pandey would like her to go to any  lab and have her blood work done, and that he would also like to schedule a CT scan and which facility would she like to go to for that.  He will then schedule a phone follow up after her CT Scan is completed.  Requested a call back to office to confirm.

## 2024-07-24 DIAGNOSIS — Z91.041 ALLERGY TO INTRAVENOUS CONTRAST: ICD-10-CM

## 2024-07-24 RX ORDER — PREDNISONE 50 MG/1
TABLET ORAL
Qty: 3 TABLET | Refills: 0 | Status: SHIPPED | OUTPATIENT
Start: 2024-07-24 | End: 2024-08-03

## 2024-07-24 RX ORDER — DIPHENHYDRAMINE HCL 50 MG
CAPSULE ORAL
Qty: 1 CAPSULE | Refills: 0 | Status: SHIPPED | OUTPATIENT
Start: 2024-07-24 | End: 2024-08-03

## 2024-07-24 NOTE — TELEPHONE ENCOUNTER
Spoke with the patient. Provided update from Dr. Pandey. Pt DOES have a CT contrast allergy and has taken benadryl and prednisone in the past before her CT. Pt verbalized understanding & had no further questions or concerns at this time.

## 2024-07-24 NOTE — TELEPHONE ENCOUNTER
Patient calling to see if Dr. Pandey would add a Vitamin A test to her lab draw because she is on Ambuttra.  Feels as though she is having some vision problems now and that that is due to low Vitamin A.      States she would like to go to [facility] for her CT.

## 2024-08-01 ENCOUNTER — APPOINTMENT (OUTPATIENT)
Dept: CARDIOLOGY | Facility: HOSPITAL | Age: 79
End: 2024-08-01
Payer: MEDICARE

## 2024-08-05 ENCOUNTER — LAB (OUTPATIENT)
Dept: LAB | Facility: LAB | Age: 79
End: 2024-08-05
Payer: MEDICARE

## 2024-08-05 DIAGNOSIS — D64.9 ANEMIA, UNSPECIFIED TYPE: ICD-10-CM

## 2024-08-05 DIAGNOSIS — E50.9 VITAMIN A DEFICIENCY: ICD-10-CM

## 2024-08-05 LAB
FERRITIN SERPL-MCNC: 333 NG/ML (ref 8–150)
HGB RETIC QN: 32 PG (ref 28–38)
IMMATURE RETIC FRACTION: 6.4 %
IRON SATN MFR SERPL: 39 % (ref 25–45)
IRON SERPL-MCNC: 115 UG/DL (ref 35–150)
RETICS #: 0.04 X10*6/UL (ref 0.02–0.11)
RETICS/RBC NFR AUTO: 1.2 % (ref 0.5–2)
TIBC SERPL-MCNC: 298 UG/DL (ref 240–445)
UIBC SERPL-MCNC: 183 UG/DL (ref 110–370)

## 2024-08-05 PROCEDURE — 84590 ASSAY OF VITAMIN A: CPT

## 2024-08-05 PROCEDURE — 36415 COLL VENOUS BLD VENIPUNCTURE: CPT

## 2024-08-05 PROCEDURE — 82728 ASSAY OF FERRITIN: CPT

## 2024-08-05 PROCEDURE — 83550 IRON BINDING TEST: CPT

## 2024-08-05 PROCEDURE — 85045 AUTOMATED RETICULOCYTE COUNT: CPT

## 2024-08-05 PROCEDURE — 83540 ASSAY OF IRON: CPT

## 2024-08-05 PROCEDURE — 83010 ASSAY OF HAPTOGLOBIN QUANT: CPT

## 2024-08-06 LAB — HAPTOGLOB SERPL-MCNC: 63 MG/DL (ref 37–246)

## 2024-08-07 ENCOUNTER — HOSPITAL ENCOUNTER (OUTPATIENT)
Dept: RADIOLOGY | Facility: CLINIC | Age: 79
Discharge: HOME | End: 2024-08-07
Payer: MEDICARE

## 2024-08-07 DIAGNOSIS — C82.93 FOLLICULAR LYMPHOMA OF INTRA-ABDOMINAL LYMPH NODES, UNSPECIFIED FOLLICULAR LYMPHOMA TYPE (MULTI): ICD-10-CM

## 2024-08-07 LAB
ANNOTATION COMMENT IMP: ABNORMAL
RETINYL PALMITATE SERPL-MCNC: 0.05 MG/L (ref 0–0.1)
VIT A SERPL-MCNC: 0.1 MG/L (ref 0.3–1.2)

## 2024-08-07 PROCEDURE — 74177 CT ABD & PELVIS W/CONTRAST: CPT | Performed by: RADIOLOGY

## 2024-08-07 PROCEDURE — 2550000001 HC RX 255 CONTRASTS: Performed by: INTERNAL MEDICINE

## 2024-08-07 PROCEDURE — 71260 CT THORAX DX C+: CPT | Performed by: RADIOLOGY

## 2024-08-07 PROCEDURE — 74177 CT ABD & PELVIS W/CONTRAST: CPT

## 2024-08-12 ENCOUNTER — APPOINTMENT (OUTPATIENT)
Dept: CARDIOLOGY | Facility: CLINIC | Age: 79
End: 2024-08-12
Payer: MEDICARE

## 2024-08-12 VITALS
HEIGHT: 69 IN | SYSTOLIC BLOOD PRESSURE: 116 MMHG | HEART RATE: 64 BPM | OXYGEN SATURATION: 98 % | WEIGHT: 121 LBS | BODY MASS INDEX: 17.92 KG/M2 | DIASTOLIC BLOOD PRESSURE: 65 MMHG

## 2024-08-12 DIAGNOSIS — I50.30 STAGE C DIASTOLIC HEART FAILURE (MULTI): ICD-10-CM

## 2024-08-12 DIAGNOSIS — E85.4 CARDIAC AMYLOIDOSIS (MULTI): Primary | ICD-10-CM

## 2024-08-12 DIAGNOSIS — I43 CARDIAC AMYLOIDOSIS (MULTI): Primary | ICD-10-CM

## 2024-08-12 PROCEDURE — 1157F ADVNC CARE PLAN IN RCRD: CPT | Performed by: INTERNAL MEDICINE

## 2024-08-12 PROCEDURE — 1036F TOBACCO NON-USER: CPT | Performed by: INTERNAL MEDICINE

## 2024-08-12 PROCEDURE — 1159F MED LIST DOCD IN RCRD: CPT | Performed by: INTERNAL MEDICINE

## 2024-08-12 PROCEDURE — 3074F SYST BP LT 130 MM HG: CPT | Performed by: INTERNAL MEDICINE

## 2024-08-12 PROCEDURE — 1160F RVW MEDS BY RX/DR IN RCRD: CPT | Performed by: INTERNAL MEDICINE

## 2024-08-12 PROCEDURE — 3078F DIAST BP <80 MM HG: CPT | Performed by: INTERNAL MEDICINE

## 2024-08-12 PROCEDURE — G2211 COMPLEX E/M VISIT ADD ON: HCPCS | Performed by: INTERNAL MEDICINE

## 2024-08-12 PROCEDURE — 99213 OFFICE O/P EST LOW 20 MIN: CPT | Performed by: INTERNAL MEDICINE

## 2024-08-12 NOTE — PROGRESS NOTES
[facility] Advanced [facility] Failure [facility]  Primary Care Physician: Ebony Carmona MD  Referring Provider/Cardiologist: Irma (/[facility])     Date of Visit: 2024  4:20 PM EDT  Location of visit: [address]     HPI:   Ms. Polo is a 78F with a PMHx sig for RA, SVT, DM, h/o gastric bypass, h/o intraductal papillary mucinous neoplasm s/p whipple, NHL currently being treated with Rituxan, hypothyroidism, and stage C diastolic HFpEF/restrictive CM secondary to cardiac amyloidosis with associated peripheral neuropathy who returns to the Advanced Heart Failure clinic for ongoing evaluation and management.      Interval hx:   Currently denies chest pain, palpitations, shortness of breath, dyspnea on exertion, orthopnea, PND. No edema noted in BLE. Patient denies headaches, dizziness or recent falls.     She was recently started on Amvuttra.     Hospitalizations: Denies      SocHx:  Lives with  in [place]    FamHx:  Mother with HF  Father with CAD and prostate CA  Brother  of ESRD       Current Outpatient Medications   Medication Sig Dispense Refill    allopurinol (Zyloprim) 300 mg tablet Take 1 tablet (300 mg) by mouth once daily. Begin on 24 the day before 1st treatment.  Take daily for 1 month only. 90 tablet 0    b complex 0.4 mg tablet Take 1 tablet by mouth once daily.      blood sugar diagnostic (Accu-Chek SmartView Test Strip) strip USE 1 STRIP TWICE DAILY.      blood-glucose meter misc Check sugars daily 1 each 0    CALCIUM ORAL Take 1 tablet by mouth 1 (one) time each day.      cyanocobalamin (Vitamin B-12) 1,000 mcg/mL injection Inject 1 mL (1,000 mcg) into the muscle every 30 (thirty) days. 1 mL 11    denosumab (Prolia) 60 mg/mL syringe Inject 1 mL (60 mg) under the skin every 6 months.      diphenhydrAMINE (BENADryl) 50 mg capsule Take one capsule (50 mg) by mouth one hour before contrast medium administration 1 capsule 0    docusate sodium (STOOL SOFTENER  ORAL) Take 1 capsule by mouth 1 (one) time each day.      gabapentin (Neurontin) 300 mg capsule Take 1 capsule (300 mg) by mouth once daily at bedtime. 30 capsule 11    levothyroxine (Synthroid, Levoxyl) 100 mcg tablet Take 1 tablet (100 mcg) by mouth once daily in the morning. Take before meals. Will need appt for additional refills. 90 tablet 0    magnesium oxide (Mag-Ox) 250 mg magnesium tablet Take 1 tablet (250 mg) by mouth 2 times a day.      metoprolol succinate XL (Toprol-XL) 25 mg 24 hr tablet TAKE 1 TABLET BY MOUTH EVERY DAY 90 tablet 3    NON FORMULARY Vitamin D 50 MCG (2000 UT) Oral Tablet; Take 1 tablet daily      pancrelipase, Lip-Prot-Amyl, (Creon) 36,000-114,000- 180,000 unit capsule,delayed release(DR/EC) capsule Take 4 capsules by mouth once daily. (Patient not taking: Reported on 7/22/2024) 360 capsule 0    prochlorperazine (Compazine) 10 mg tablet Take 1 tablet (10 mg) by mouth every 6 hours if needed for nausea or vomiting. 30 tablet 5    sulfaSALAzine (Azulfidine) 500 mg tablet Take 2 tablets (1,000 mg) by mouth 2 times a day. 1,000 mg in the morning and 1500mg at night      tafamidis (Vyndamax) 61 mg capsule Take 1 capsule (61 mg) by mouth once daily. 90 capsule 3    vitamins A and D capsule Take 3,000 Units by mouth every other day. 15 each 11    vutrisiran sodium (AMVUTTRA SUBQ) Inject under the skin.      WHEAT DEXTRIN ORAL Benefiber Oral Powder; Use as directed      zinc gluconate 50 mg tablet Take 1 tablet (50 mg) by mouth once daily.       No current facility-administered medications for this visit.       Allergies   Allergen Reactions    Cephalosporins Anaphylaxis    Penicillins Anaphylaxis    Ace Inhibitors Cough    Atorvastatin Unknown     Elevated LFT    Iodinated Contrast Media Unknown         Visit Vitals  Smoking Status Former        Physical Exam:  On exam Ms. Polo appears her stated age, is alert and oriented x3, and in no acute distress. Her sclera are anicteric and her  oropharynx has moist mucous membranes. Her neck is supple and without thyromegaly. The JVP is ~6 cm of water above the right atrium. Her cardiac exam has regular rhythm, normal S1, S2. No S3/4. There are no murmurs. Her lungs are clear to auscultation bilaterally and there is no dullness to percussion. Her abdomen is soft, nontender with normoactive bowel sounds. There is no HJR. The extremities are warm and without sig edema. The skin is dry. There is no rash present. The distal pulses are 2+ in all four extremities. Her mood and affect are appropriate for todays encounter.       Cardiac Labs/Diagnostics:    Lab Results   Component Value Date    CREATININE 0.94 07/10/2024    BUN 35 (H) 07/10/2024     07/10/2024    K 4.4 07/10/2024     07/10/2024    CO2 24 07/10/2024        Recent Labs     08/01/23  1120 08/09/22  1343 08/09/22  1340   CHOL 216* 146 145   LDLF 122* 67 68   HDL 74.8 62.1 60.7   TRIG 95 83 84       Recent Labs     09/14/23  1603   *       PYP scan (10/12/23):  Amyloid planar and SPECT heart study is suggestive of TTR amyloidosis.     ECG (9/28/23):  Sinus rhythm (HR 70), no evidence of low voltage    Echo (9/8/23):  1. Left ventricular systolic function is normal with a 55-60% estimated ejection fraction.  2. The mean GLPS is 14.2%, which is diminished, with relatively normal strain values involving the apex. The myocardium has somewhat of a speckled appearance. These two findings raise the question of possible cardiac amyloidosis.  3. There is moderate to severe concentric left ventricular hypertrophy.  4. The left atrium is moderately dilated.  5. The estimated RVSP is 19 mm.      Impression/Plan:  Ms. Polo is a 78F with a PMHx sig for RA, SVT, DM, h/o gastric bypass, h/o intraductal papillary mucinous neoplasm s/p whipple, NHL currently being treated with Rituxan, hypothyroidism, and stage C diastolic HFpEF/restrictive CM secondary to cardiac amyloidosis with associated  peripheral neuropathy who returns to the Advanced Heart Failure clinic for ongoing evaluation and management. At the current time she has functional class II symptoms and appears hypo/euvolemic.     1) Stage C chronic diastolic HF/HFpEF/restrictive CM secondary to hATTR (c.[id number]>G; [id number]) cardiac amyloidosis  Work up consistent with hATTR CA. Family undergoing screening. Recently started on Amvuttra for her peripheral neuropathy.     -c/w vyndamax 61 mg daily and amvuttra  -f/u with neurology    2) pSVT  Currently on beta blocker; may need to reconsider therapy in the future if BB starts to affect her CO.      F/U: 6 months at /[facility]    Mychal,  Thank you for referring Ms. Polo to the  Advanced Heart Failure Clinic. Please let me know if you have any questions.       ____________________________________________________________  Dejan Fortune,   Section of Advanced Heart Failure and Cardiac Transplantation  Division of Cardiovascular Medicine  [facility] [place]  [facility]

## 2024-08-12 NOTE — PATIENT INSTRUCTIONS
It was a pleasure seeing you today. Please contact myself or my team with any questions.     To reach Dr. Fortune' office please call [phone] ([place]).   Fax: [phone]   To schedule an appointment call [phone]     If you have any questions or need cardiac medication refills, please call the Heart Failure office at [phone], option [phone]. You may also contact the  Heart Failure Nursing team via email at [email or web address] (Please include your name and date of birth).        1) Continue your vyndamax  2) Follow up in 6 months at /[facility]

## 2024-08-27 ENCOUNTER — DOCUMENTATION (OUTPATIENT)
Dept: NEUROLOGY | Facility: HOSPITAL | Age: 79
End: 2024-08-27
Payer: MEDICARE

## 2024-08-28 NOTE — PROGRESS NOTES
I called Mrs. Adilene Polo today regarding her vitamin A level.    She reports that she has been on vitamin A 2400 mcg daily since she was started on Vutisiran injections.  She also takes a One-A-Day multivitamin which has vitamin A also.    I reviewed her vitamin A levels.  Her level 4 months ago before the initiation of treatment was slightly reduced at 0.21 mg/L (normal 0.30 -1.2).  Her level 3 weeks ago was 0.1 mg/L.    She reminds me today that she had a bariatric surgery with bypass 20 years ago.  She may have issues with absorption.    Plan: I asked her to increase her vitamin A dose from 2400 mcg daily to 2400 alternating with 4800 mcg daily.  W we will ill repeat her vitamin A level when she returns to see me in a month.    Raman Saavedra M.D., F.A.C.P.   Director, [facility] & [facility] laboratory   [facility]   [facility]   Professor of Neurology   [facility]

## 2024-08-30 ENCOUNTER — TELEMEDICINE (OUTPATIENT)
Dept: HEMATOLOGY/ONCOLOGY | Facility: CLINIC | Age: 79
End: 2024-08-30
Payer: MEDICARE

## 2024-08-30 DIAGNOSIS — C82.93 FOLLICULAR LYMPHOMA OF INTRA-ABDOMINAL LYMPH NODES, UNSPECIFIED FOLLICULAR LYMPHOMA TYPE (MULTI): ICD-10-CM

## 2024-08-30 DIAGNOSIS — I43 CARDIAC AMYLOIDOSIS (MULTI): ICD-10-CM

## 2024-08-30 DIAGNOSIS — E06.3 HYPOTHYROIDISM DUE TO HASHIMOTO'S THYROIDITIS: ICD-10-CM

## 2024-08-30 DIAGNOSIS — E85.4 CARDIAC AMYLOIDOSIS (MULTI): ICD-10-CM

## 2024-08-30 DIAGNOSIS — D49.0 IPMN (INTRADUCTAL PAPILLARY MUCINOUS NEOPLASM): ICD-10-CM

## 2024-08-30 DIAGNOSIS — D59.10 AUTOIMMUNE HEMOLYTIC ANEMIA (MULTI): Primary | ICD-10-CM

## 2024-08-30 DIAGNOSIS — I10 PRIMARY HYPERTENSION: ICD-10-CM

## 2024-08-30 DIAGNOSIS — E03.8 HYPOTHYROIDISM DUE TO HASHIMOTO'S THYROIDITIS: ICD-10-CM

## 2024-08-30 PROCEDURE — 1157F ADVNC CARE PLAN IN RCRD: CPT | Performed by: INTERNAL MEDICINE

## 2024-08-30 PROCEDURE — 99442 PR PHYS/QHP TELEPHONE EVALUATION 11-20 MIN: CPT | Performed by: INTERNAL MEDICINE

## 2024-08-30 NOTE — PATIENT INSTRUCTIONS
"We will recheck your labs in November--it is not definite that you may need \"maintenance\" rituxan  "

## 2024-09-02 ASSESSMENT — ENCOUNTER SYMPTOMS
ENDOCRINE NEGATIVE: 1
CARDIOVASCULAR NEGATIVE: 1
RESPIRATORY NEGATIVE: 1
CONSTITUTIONAL NEGATIVE: 1
HEMATOLOGIC/LYMPHATIC NEGATIVE: 1
MUSCULOSKELETAL NEGATIVE: 1
GASTROINTESTINAL NEGATIVE: 1
PSYCHIATRIC NEGATIVE: 1
NEUROLOGICAL NEGATIVE: 1
EYES NEGATIVE: 1

## 2024-09-02 NOTE — PROGRESS NOTES
Patient ID: Adilene Polo is a 78 y.o. female.  Referring Physician: Terry Pandey MD  [id number]  [place] [address]  Primary Care Provider: Ebony Carmona MD  Visit Type:  Follow Up     Verbal consent was requested and obtained from patient on this date for a telehealth visit.    Subjective    HPI How was my CT scan?     Review of Systems   Constitutional: Negative.    HENT:  Negative.     Eyes: Negative.    Respiratory: Negative.     Cardiovascular: Negative.    Gastrointestinal: Negative.    Endocrine: Negative.    Genitourinary: Negative.     Musculoskeletal: Negative.    Skin: Negative.    Neurological: Negative.    Hematological: Negative.    Psychiatric/Behavioral: Negative.          Objective   BSA: There is no height or weight on file to calculate BSA.  There were no vitals taken for this visit.     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   has a past surgical history that includes Cholecystectomy (10/28/2014); Gastric bypass (10/28/2014); Incisional breast biopsy (07/20/2018); Thyroidectomy, partial; and Whipple procedure w/ laparoscopy.  Family History   Problem Relation Name Age of Onset    Heart disease Mother          Coronary    Heart disease Father          Coronary    Heart attack Father      Prostate cancer Father      Breast cancer Sister      Kidney disease Brother      Lung cancer Brother      Prostate cancer  Brother      Pulmonary embolism Brother       Oncology History   Follicular lymphoma (Multi)   2/5/2023 Initial Diagnosis    Follicular lymphoma (Multi)     6/19/2024 -  Chemotherapy    RiTUXimab (Weekly), 28 Day Cycle          Adilene Polo  reports that she quit smoking about 44 years ago. Her smoking use included cigarettes. She has never used smokeless tobacco.  She  reports no history of alcohol use.  She  reports no history of drug use.    Physical Exam    WBC   Date/Time Value Ref Range Status   07/10/2024 09:33 AM 3.3 (L) 4.4 - 11.3 x10*3/uL Final   07/03/2024 09:36 AM 3.2 (L) 4.4 - 11.3 x10*3/uL Final   06/26/2024 09:26 AM 2.8 (L) 4.4 - 11.3 x10*3/uL Final     nRBC   Date Value Ref Range Status   07/10/2024 0.0 0.0 - 0.0 /100 WBCs Final   07/03/2024 0.0 0.0 - 0.0 /100 WBCs Final   06/26/2024 0.0 0.0 - 0.0 /100 WBCs Final     RBC   Date Value Ref Range Status   07/10/2024 2.86 (L) 4.00 - 5.20 x10*6/uL Final   07/03/2024 3.17 (L) 4.00 - 5.20 x10*6/uL Final   06/26/2024 3.25 (L) 4.00 - 5.20 x10*6/uL Final     Hemoglobin   Date Value Ref Range Status   07/10/2024 9.6 (L) 12.0 - 16.0 g/dL Final   07/03/2024 10.5 (L) 12.0 - 16.0 g/dL Final   06/26/2024 10.6 (L) 12.0 - 16.0 g/dL Final     Hematocrit   Date Value Ref Range Status   07/10/2024 29.5 (L) 36.0 - 46.0 % Final   07/03/2024 32.7 (L) 36.0 - 46.0 % Final   06/26/2024 32.6 (L) 36.0 - 46.0 % Final     MCV   Date/Time Value Ref Range Status   07/10/2024 09:33  (H) 80 - 100 fL Final   07/03/2024 09:36  (H) 80 - 100 fL Final   06/26/2024 09:26  80 - 100 fL Final     MCH   Date/Time Value Ref Range Status   07/10/2024 09:33 AM 33.6 26.0 - 34.0 pg Final   07/03/2024 09:36 AM 33.1 26.0 - 34.0 pg Final   06/26/2024 09:26 AM 32.6 26.0 - 34.0 pg Final     MCHC   Date/Time Value Ref Range Status   07/10/2024 09:33 AM 32.5 32.0 - 36.0 g/dL Final   07/03/2024 09:36 AM 32.1 32.0 - 36.0 g/dL Final   06/26/2024 09:26 AM 32.5 32.0 - 36.0 g/dL Final     RDW  "  Date/Time Value Ref Range Status   07/10/2024 09:33 AM 14.8 (H) 11.5 - 14.5 % Final   07/03/2024 09:36 AM 14.3 11.5 - 14.5 % Final   06/26/2024 09:26 AM 13.6 11.5 - 14.5 % Final     Platelets   Date/Time Value Ref Range Status   07/10/2024 09:33  150 - 450 x10*3/uL Final   07/03/2024 09:36  150 - 450 x10*3/uL Final   06/26/2024 09:26  150 - 450 x10*3/uL Final     No results found for: \"MPV\"  Neutrophils %   Date/Time Value Ref Range Status   07/10/2024 09:33 AM 66.9 40.0 - 80.0 % Final   07/03/2024 09:36 AM 60.6 40.0 - 80.0 % Final   06/26/2024 09:26 AM 51.2 40.0 - 80.0 % Final     Immature Granulocytes %, Automated   Date/Time Value Ref Range Status   07/10/2024 09:33 AM 0.3 0.0 - 0.9 % Final     Comment:     Immature Granulocyte Count (IG) includes promyelocytes, myelocytes and metamyelocytes but does not include bands. Percent differential counts (%) should be interpreted in the context of the absolute cell counts (cells/UL).   07/03/2024 09:36 AM 0.3 0.0 - 0.9 % Final     Comment:     Immature Granulocyte Count (IG) includes promyelocytes, myelocytes and metamyelocytes but does not include bands. Percent differential counts (%) should be interpreted in the context of the absolute cell counts (cells/UL).   06/26/2024 09:26 AM 0.0 0.0 - 0.9 % Final     Comment:     Immature Granulocyte Count (IG) includes promyelocytes, myelocytes and metamyelocytes but does not include bands. Percent differential counts (%) should be interpreted in the context of the absolute cell counts (cells/UL).     Lymphocytes %   Date/Time Value Ref Range Status   07/10/2024 09:33 AM 24.7 13.0 - 44.0 % Final   07/03/2024 09:36 AM 29.2 13.0 - 44.0 % Final   06/26/2024 09:26 AM 38.4 13.0 - 44.0 % Final     Monocytes %   Date/Time Value Ref Range Status   07/10/2024 09:33 AM 6.0 2.0 - 10.0 % Final   07/03/2024 09:36 AM 6.8 2.0 - 10.0 % Final   06/26/2024 09:26 AM 6.5 2.0 - 10.0 % Final     Eosinophils %   Date/Time Value " Ref Range Status   07/10/2024 09:33 AM 0.9 0.0 - 6.0 % Final   07/03/2024 09:36 AM 1.9 0.0 - 6.0 % Final   06/26/2024 09:26 AM 2.5 0.0 - 6.0 % Final     Basophils %   Date/Time Value Ref Range Status   07/10/2024 09:33 AM 1.2 0.0 - 2.0 % Final   07/03/2024 09:36 AM 1.2 0.0 - 2.0 % Final   06/26/2024 09:26 AM 1.4 0.0 - 2.0 % Final     Neutrophils Absolute   Date/Time Value Ref Range Status   07/10/2024 09:33 AM 2.22 1.60 - 5.50 x10*3/uL Final     Comment:     Percent differential counts (%) should be interpreted in the context of the absolute cell counts (cells/uL).   07/03/2024 09:36 AM 1.95 1.60 - 5.50 x10*3/uL Final     Comment:     Percent differential counts (%) should be interpreted in the context of the absolute cell counts (cells/uL).   06/26/2024 09:26 AM 1.41 (L) 1.60 - 5.50 x10*3/uL Final     Comment:     Percent differential counts (%) should be interpreted in the context of the absolute cell counts (cells/uL).     Immature Granulocytes Absolute, Automated   Date/Time Value Ref Range Status   07/10/2024 09:33 AM 0.01 0.00 - 0.50 x10*3/uL Final   07/03/2024 09:36 AM 0.01 0.00 - 0.50 x10*3/uL Final   06/26/2024 09:26 AM 0.00 0.00 - 0.50 x10*3/uL Final     Lymphocytes Absolute   Date/Time Value Ref Range Status   07/10/2024 09:33 AM 0.82 0.80 - 3.00 x10*3/uL Final   07/03/2024 09:36 AM 0.94 0.80 - 3.00 x10*3/uL Final   06/26/2024 09:26 AM 1.06 0.80 - 3.00 x10*3/uL Final     Monocytes Absolute   Date/Time Value Ref Range Status   07/10/2024 09:33 AM 0.20 0.05 - 0.80 x10*3/uL Final   07/03/2024 09:36 AM 0.22 0.05 - 0.80 x10*3/uL Final   06/26/2024 09:26 AM 0.18 0.05 - 0.80 x10*3/uL Final     Eosinophils Absolute   Date/Time Value Ref Range Status   07/10/2024 09:33 AM 0.03 0.00 - 0.40 x10*3/uL Final   07/03/2024 09:36 AM 0.06 0.00 - 0.40 x10*3/uL Final   06/26/2024 09:26 AM 0.07 0.00 - 0.40 x10*3/uL Final     Basophils Absolute   Date/Time Value Ref Range Status   07/10/2024 09:33 AM 0.04 0.00 - 0.10 x10*3/uL  "Final   07/03/2024 09:36 AM 0.04 0.00 - 0.10 x10*3/uL Final   06/26/2024 09:26 AM 0.04 0.00 - 0.10 x10*3/uL Final       No components found for: \"PT\"  aPTT   Date/Time Value Ref Range Status   08/09/2022 01:40 PM 33 26 - 39 sec Final     Comment:       THE APTT IS NO LONGER USED FOR MONITORING     UNFRACTIONATED HEPARIN THERAPY.    FOR MONITORING HEPARIN THERAPY,     USE THE HEPARIN ASSAY.     06/17/2021 08:16 AM 33 25 - 35 sec Final     Comment:       THE APTT IS NO LONGER USED FOR MONITORING     UNFRACTIONATED HEPARIN THERAPY.    FOR MONITORING HEPARIN THERAPY,     USE THE HEPARIN ASSAY.     03/02/2020 04:34 PM 63 (H) 28 - 38 sec Final     Comment:       THE APTT IS NO LONGER USED FOR MONITORING     UNFRACTIONATED HEPARIN THERAPY.    FOR MONITORING HEPARIN THERAPY,     USE THE HEPARIN ASSAY.       Medication Documentation Review Audit       Reviewed by Dejan Fortune DO (Physician) on 08/12/24 at 1640      Medication Order Taking? Sig Documenting Provider Last Dose Status   allopurinol (Zyloprim) 300 mg tablet [id number] No Take 1 tablet (300 mg) by mouth once daily. Begin on 6/18/24 the day before 1st treatment.  Take daily for 1 month only.   Patient not taking: Reported on 8/12/2024    Terry Pandey MD Not Taking Active   b complex 0.4 mg tablet [id number] Yes Take 1 tablet by mouth once daily. Historical Provider, MD Taking Active   blood sugar diagnostic (Accu-Chek SmartView Test Strip) strip [id number] Yes USE 1 STRIP TWICE DAILY. Historical Provider, MD Taking Active   blood-glucose meter misc [id number] Yes Check sugars daily Ebony Carmona MD Taking Active   CALCIUM ORAL [id number] Yes Take 1 tablet by mouth 1 (one) time each day. Historical Provider, MD Taking Active   cyanocobalamin (Vitamin B-12) 1,000 mcg/mL injection [id number] Yes Inject 1 mL (1,000 mcg) into the muscle every 30 (thirty) days. Ebony Carmona MD Taking Active   denosumab (Prolia) 60 mg/mL syringe [id number] Yes Inject 1 mL (60 mg) under the " skin every 6 months. Historical MD Sylvia Taking Active   diphenhydrAMINE (BENADryl) 50 mg capsule [id number]  Take one capsule (50 mg) by mouth one hour before contrast medium administration Terry Pandey MD   24 235   docusate sodium (STOOL SOFTENER ORAL) [id number] Yes Take 1 capsule by mouth 1 (one) time each day. Brittany Ward MD Taking Active   gabapentin (Neurontin) 300 mg capsule [id number] Yes Take 1 capsule (300 mg) by mouth once daily at bedtime. Shai Cerda MD Taking Active   levothyroxine (Synthroid, Levoxyl) 100 mcg tablet [id number] Yes Take 1 tablet (100 mcg) by mouth once daily in the morning. Take before meals. Will need appt for additional refills. Ebony Carmona MD Taking Active   magnesium oxide (Mag-Ox) 250 mg magnesium tablet [id number] Yes Take 1 tablet (250 mg) by mouth 2 times a day. Historical MD Sylvia Taking Active   metoprolol succinate XL (Toprol-XL) 25 mg 24 hr tablet [id number] Yes TAKE 1 TABLET BY MOUTH EVERY DAY Mychal Solis MD Taking Active   NON FORMULARY [id number] Yes Vitamin D 50 MCG (2000) Oral Tablet; Take 1 tablet daily Historical Provider, MD Taking Active   Patient not taking:  Discontinued 24 1640   prochlorperazine (Compazine) 10 mg tablet [id number] Yes Take 1 tablet (10 mg) by mouth every 6 hours if needed for nausea or vomiting. Terry Pandey MD Taking Active   sulfaSALAzine (Azulfidine) 500 mg tablet [id number] Yes Take 2 tablets (1,000 mg) by mouth 2 times a day. 1,000 mg in the morning and 1500mg at night Historical Provider, MD Taking Active   tafamidis (Vyndamax) 61 mg capsule [id number] Yes Take 1 capsule (61 mg) by mouth once daily. Dejan Fortune DO Taking Active   vitamins A and D capsule [id number] Yes Take 3,000 Units by mouth every other day. Shai Cerda MD Taking Active   vutrisiran sodium (AMVUTTRA SUBQ) [id number] No Inject under the skin. Brittany Ward MD Not Taking Active   WHEAT DEXTRIN ORAL [id number] Yes  Benefiber Oral Powder; Use as directed Historical Provider, MD Taking Active   zinc gluconate 50 mg tablet [id number] Yes Take 1 tablet (50 mg) by mouth once daily. 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 (20+ lymph nodes), 5% focus of grade 3B lymphoma found in 1 lymph node  -bone marrow bx done in 2022 was negative, however, that doesn't completely prove her marrow was not involved due to sampling variant; nearly all patients with follicular lymphoma ultimately have marrow involvement  --8/15/2023 PET scan reviewed--left para-aortic lymph node (secondary to lymphoma) is slightly enlarged and slightly PET avid  -reviewed 11/13/2023 CT scan--her para-aortic nodes are enlarging  -she is feeling more and more fatigued as times goes on--some of it clearly is due to her cardiac amyloidosis, however, her lymphoma and anemia clearly are contributing to her fatigue as well  -labs done on 4/25/2024 reviewed--wbc 3.6, hgb 10, , plt 237,000, , SPEP/IF negative, serum kappa LC 2.85, TIBC 317, sat 40%, ferritin 354, retic 1.4%, direct milena negative, haptoglobin <10  -treating even with a course of rituxan may very likely help with her anemia  -here for interval followup via telephone  -completed a course of rituxan (6/19, 6/26, 7/3 and 7/10/2024)  -labs done on 8/5/2024 reviewed (somehow [facility] missed CBC, COMP, and LDH orders)--haptoglobin 63, retic 1.2%, TIBC 298, sat 39%, ferritin 333  -labs now do not seem to be consistent with hemolysis  -CT scan done on 8/7/2024 reviewed--multiple scattered punctate 1-2 mm nodules throughout the lungs; densely calcified granuloma in the LLL; no mediastinal hilar or axillary lymphadenopathy; diffuse fatty infiltration of the liver; post Whipple procedure; spleen is normal in size; mild diffuse mesenteric haziness is again noted; interval  decrease in retroperitoneal lymphadenopathy; left para-aortic node was 20 x 19 mm now 15 x 12; another node was 18 x 15 mm now 15 x 11 cm  -will hold off on consideration for maintenance rituxan unless hemolysis recurs  -will see her again in November       2) cardiac amyloidosis  -follows with Dr Romero  -on vyndamax  -also recently started 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)--which can deplete vitamin A levels  -so she is currently also taking vitamin A supplement     3) hypothyroidism  -on levothyroxine     4) hypertension  -on metoprolol     5) IPMN  -s/p Whipple resection by Dr Ventura on 3/2/2020  -on creon for pancreatic insufficiency       Problem List Items Addressed This Visit             ICD-10-CM    Follicular lymphoma (Multi) C82.90            Terry Pandey MD

## 2024-09-10 DIAGNOSIS — E06.3 HYPOTHYROIDISM DUE TO HASHIMOTO'S THYROIDITIS: Primary | ICD-10-CM

## 2024-09-10 DIAGNOSIS — E03.8 HYPOTHYROIDISM DUE TO HASHIMOTO'S THYROIDITIS: Primary | ICD-10-CM

## 2024-09-11 RX ORDER — LEVOTHYROXINE SODIUM 100 UG/1
100 TABLET ORAL
Qty: 90 TABLET | Refills: 1 | Status: SHIPPED | OUTPATIENT
Start: 2024-09-11

## 2024-09-12 ENCOUNTER — APPOINTMENT (OUTPATIENT)
Dept: NEUROLOGY | Facility: CLINIC | Age: 79
End: 2024-09-12
Payer: MEDICARE

## 2024-09-25 DIAGNOSIS — G63 FAMILIAL TRANSTHYRETIN AMYLOIDOSIS (MULTI): Primary | ICD-10-CM

## 2024-09-25 DIAGNOSIS — E85.1 FAMILIAL TRANSTHYRETIN AMYLOIDOSIS (MULTI): Primary | ICD-10-CM

## 2024-09-26 ENCOUNTER — APPOINTMENT (OUTPATIENT)
Dept: NEUROLOGY | Facility: CLINIC | Age: 79
End: 2024-09-26
Payer: MEDICARE

## 2024-09-26 ENCOUNTER — LAB (OUTPATIENT)
Dept: LAB | Facility: LAB | Age: 79
End: 2024-09-26
Payer: MEDICARE

## 2024-09-26 DIAGNOSIS — G63 FAMILIAL TRANSTHYRETIN AMYLOIDOSIS (MULTI): ICD-10-CM

## 2024-09-26 DIAGNOSIS — E85.1 FAMILIAL TRANSTHYRETIN AMYLOIDOSIS (MULTI): ICD-10-CM

## 2024-09-26 LAB
ALBUMIN SERPL BCP-MCNC: 4.1 G/DL (ref 3.4–5)
ALP SERPL-CCNC: 108 U/L (ref 33–136)
ALT SERPL W P-5'-P-CCNC: 29 U/L (ref 7–45)
ANION GAP SERPL CALC-SCNC: 13 MMOL/L (ref 10–20)
AST SERPL W P-5'-P-CCNC: 38 U/L (ref 9–39)
BILIRUB SERPL-MCNC: 0.7 MG/DL (ref 0–1.2)
BUN SERPL-MCNC: 27 MG/DL (ref 6–23)
CALCIUM SERPL-MCNC: 9 MG/DL (ref 8.6–10.6)
CHLORIDE SERPL-SCNC: 104 MMOL/L (ref 98–107)
CO2 SERPL-SCNC: 28 MMOL/L (ref 21–32)
CREAT SERPL-MCNC: 0.84 MG/DL (ref 0.5–1.05)
EGFRCR SERPLBLD CKD-EPI 2021: 71 ML/MIN/1.73M*2
ERYTHROCYTE [DISTWIDTH] IN BLOOD BY AUTOMATED COUNT: 14.6 % (ref 11.5–14.5)
GLUCOSE SERPL-MCNC: 92 MG/DL (ref 74–99)
HCT VFR BLD AUTO: 33.8 % (ref 36–46)
HGB BLD-MCNC: 10.4 G/DL (ref 12–16)
MCH RBC QN AUTO: 33.1 PG (ref 26–34)
MCHC RBC AUTO-ENTMCNC: 30.8 G/DL (ref 32–36)
MCV RBC AUTO: 108 FL (ref 80–100)
NRBC BLD-RTO: 0 /100 WBCS (ref 0–0)
PLATELET # BLD AUTO: 249 X10*3/UL (ref 150–450)
POTASSIUM SERPL-SCNC: 4.6 MMOL/L (ref 3.5–5.3)
PROT SERPL-MCNC: 6.4 G/DL (ref 6.4–8.2)
RBC # BLD AUTO: 3.14 X10*6/UL (ref 4–5.2)
SODIUM SERPL-SCNC: 140 MMOL/L (ref 136–145)
WBC # BLD AUTO: 3.3 X10*3/UL (ref 4.4–11.3)

## 2024-09-26 PROCEDURE — 36415 COLL VENOUS BLD VENIPUNCTURE: CPT

## 2024-09-26 PROCEDURE — 85027 COMPLETE CBC AUTOMATED: CPT

## 2024-09-26 PROCEDURE — 84590 ASSAY OF VITAMIN A: CPT

## 2024-09-26 PROCEDURE — 80053 COMPREHEN METABOLIC PANEL: CPT

## 2024-09-30 ENCOUNTER — APPOINTMENT (OUTPATIENT)
Dept: NEUROLOGY | Facility: CLINIC | Age: 79
End: 2024-09-30
Payer: MEDICARE

## 2024-09-30 VITALS
HEART RATE: 72 BPM | RESPIRATION RATE: 18 BRPM | WEIGHT: 120 LBS | SYSTOLIC BLOOD PRESSURE: 120 MMHG | BODY MASS INDEX: 17.77 KG/M2 | DIASTOLIC BLOOD PRESSURE: 75 MMHG | HEIGHT: 69 IN

## 2024-09-30 DIAGNOSIS — E85.1: ICD-10-CM

## 2024-09-30 DIAGNOSIS — G63: ICD-10-CM

## 2024-09-30 DIAGNOSIS — E85.1 FAMILIAL TRANSTHYRETIN AMYLOIDOSIS (MULTI): Primary | ICD-10-CM

## 2024-09-30 DIAGNOSIS — M79.2 NEUROPATHIC PAIN: ICD-10-CM

## 2024-09-30 DIAGNOSIS — G63 FAMILIAL TRANSTHYRETIN AMYLOIDOSIS (MULTI): Primary | ICD-10-CM

## 2024-09-30 LAB
ANNOTATION COMMENT IMP: ABNORMAL
RETINYL PALMITATE SERPL-MCNC: 0.03 MG/L (ref 0–0.1)
VIT A SERPL-MCNC: 0.07 MG/L (ref 0.3–1.2)

## 2024-09-30 PROCEDURE — 3078F DIAST BP <80 MM HG: CPT | Performed by: PSYCHIATRY & NEUROLOGY

## 2024-09-30 PROCEDURE — 3074F SYST BP LT 130 MM HG: CPT | Performed by: PSYCHIATRY & NEUROLOGY

## 2024-09-30 PROCEDURE — 1126F AMNT PAIN NOTED NONE PRSNT: CPT | Performed by: PSYCHIATRY & NEUROLOGY

## 2024-09-30 PROCEDURE — 1157F ADVNC CARE PLAN IN RCRD: CPT | Performed by: PSYCHIATRY & NEUROLOGY

## 2024-09-30 PROCEDURE — 1159F MED LIST DOCD IN RCRD: CPT | Performed by: PSYCHIATRY & NEUROLOGY

## 2024-09-30 PROCEDURE — 99214 OFFICE O/P EST MOD 30 MIN: CPT | Performed by: PSYCHIATRY & NEUROLOGY

## 2024-09-30 RX ORDER — GABAPENTIN 300 MG/1
600 CAPSULE ORAL NIGHTLY
Qty: 180 CAPSULE | Refills: 3 | Status: SHIPPED | OUTPATIENT
Start: 2024-09-30 | End: 2025-09-30

## 2024-09-30 ASSESSMENT — PAIN SCALES - GENERAL: PAINLEVEL: 0-NO PAIN

## 2024-09-30 NOTE — PROGRESS NOTES
Date of Service: 9/30/2024  Patient: Adilene Polo  MRN: [id number]      Diagnosis:     Familial transthyretin TTR amyloidosis     TTR Amyloid neuropathy      History of Present Illness:    Ms. Polo is a 78 y.o. female with ATTR amyloidosis with neuropathy who returns today for follow-up.  We last saw her on June 5, 2024.    She returns today reporting no significant change in her symptoms.  Since last visit, her symptoms are stable.  She complains of shooting electrical pains in both of her legs waking her up from sleep every night.  She does not taking any medications for this.     She was started in early 2024 Tafamadis for cardiac ATTR amyloidosis, and on subcutaneous Vutisiran and vitamin A supplementation in May 2024.  She has tolerated both drugs well with no reported side effect. In addition she reports that her lymphoma is of concern and her oncologist is planning to treat her with rituximab.    To recap, her neuropathic symptoms started in early 2024 with mostly symmetric upper and lower extremity sensory disturbance along with allodynia in the feet. She also has proximal and distal weakness slightly worse in the left hand and bilateral hip flexors. Reflexes is trace to absent. She has multiple other sings of ATTR including cardiac disease (CHF), bilateral carpal tunnel, right biceps tear, lightheadedness and syncope once, dry mouth and skin, and alternating diarrhea and constipation. DNA test showing a pathogenic heterozygous gene (c.238A>G; [id number]) and cardiac imaging confirming TTR deposition in the heart. EMG confirms evidence of a generalized severe nonlength-dependent axonal polyneuropathy. he likely has family history of this, in her mother and brothers however they were not tested. Her daughter however has also tested positive for ATTR but is asymptomatic.     Otherwise, the past medical history, social history, and review of systems were reviewed. There are no significant changes.    Neuromuscular  Exam:      There is atrophy in the hands with weakness of hand muscles as well as ankle dorsiflexors and plantar flexors bilaterally.  She is areflexic throughout except for +1 knee jerks bilaterally.  On sensory testing, she has significant reduction in pinprick and touch sensation in both feet and hands with a distal to proximal pattern.  Vibration sense is absent at the toes and slightly reduced position senses.  She has allodynia of both feet.  Her gait is wide-based.     Results:     I reviewed her vitamin A levels.  Her level 4 months ago before the initiation of treatment was slightly reduced at 0.21 mg/L (normal 0.30 -1.2).  Her level 3 weeks ago was 0.1 mg/L.    Impression:     Adilene Polo is a 78 y.o. woman with ATTR amyloidosis manifesting as a peripheral neuropathy and cardiomyopathy.  She is currently on oral Tafamadis and subcutaneous Vutisiran and tolerated both well.    She has had low vitamin A level in the serum.  I had asked her to increase her vitamin A dose from 2400 mcg daily to 2400 alternating with 4800 mcg daily, but her level continues to be low.  Consulting with the , it was suggested that vitamin A does not need to be measured in the blood since vitamin A in the serum does not correlate well with the total vitamin A level.  I discussed this with her and explained to her that she should continue the current regimen and not increase further since hypervitaminosis A can also be problematic.    I also explained to her that treatment rituximab should not have any detrimental effect on her peripheral polyneuropathy.  For possible lymphoma    She will return to clinic in 3 to 4 months.  She will call for any questions.        Raman Saavedra M.D., F.A.C.P.   Director, [facility] & EMG laboratory   [facility]   [facility]   Professor of Neurology   [facility] [facility] [employer]       The total  appointment time today was 30 minutes. Time included preparing to see the patient, obtaining the history, performing a medically necessary appropriate physical examination, counseling and educating the patient/family, ordering tests, referring and communicating with other providers, independently interpreting results  to the patient/family and documenting clinical information in the medical record.

## 2024-10-06 PROBLEM — E85.1: Status: ACTIVE | Noted: 2024-04-25

## 2024-10-06 PROBLEM — G63: Status: ACTIVE | Noted: 2024-04-25

## 2024-10-10 ENCOUNTER — APPOINTMENT (OUTPATIENT)
Dept: CARDIOLOGY | Facility: CLINIC | Age: 79
End: 2024-10-10
Payer: MEDICARE

## 2024-10-16 ENCOUNTER — APPOINTMENT (OUTPATIENT)
Dept: CARDIOLOGY | Facility: CLINIC | Age: 79
End: 2024-10-16
Payer: MEDICARE

## 2024-11-18 ENCOUNTER — LAB (OUTPATIENT)
Dept: LAB | Facility: LAB | Age: 79
End: 2024-11-18
Payer: MEDICARE

## 2024-11-18 DIAGNOSIS — E06.3 HYPOTHYROIDISM DUE TO HASHIMOTO THYROIDITIS: ICD-10-CM

## 2024-11-18 DIAGNOSIS — D59.10 AUTOIMMUNE HEMOLYTIC ANEMIA (MULTI): ICD-10-CM

## 2024-11-18 DIAGNOSIS — D64.9 ANEMIA, UNSPECIFIED TYPE: ICD-10-CM

## 2024-11-18 DIAGNOSIS — C82.93 FOLLICULAR LYMPHOMA OF INTRA-ABDOMINAL LYMPH NODES, UNSPECIFIED FOLLICULAR LYMPHOMA TYPE: ICD-10-CM

## 2024-11-18 DIAGNOSIS — E11.9 TYPE 2 DIABETES MELLITUS WITHOUT COMPLICATION, WITHOUT LONG-TERM CURRENT USE OF INSULIN (MULTI): ICD-10-CM

## 2024-11-18 LAB
ALBUMIN SERPL BCP-MCNC: 4.1 G/DL (ref 3.4–5)
ALP SERPL-CCNC: 177 U/L (ref 33–136)
ALT SERPL W P-5'-P-CCNC: 20 U/L (ref 7–45)
ANION GAP SERPL CALC-SCNC: 15 MMOL/L (ref 10–20)
AST SERPL W P-5'-P-CCNC: 29 U/L (ref 9–39)
BASOPHILS # BLD AUTO: 0.03 X10*3/UL (ref 0–0.1)
BASOPHILS NFR BLD AUTO: 0.8 %
BILIRUB SERPL-MCNC: 1 MG/DL (ref 0–1.2)
BUN SERPL-MCNC: 33 MG/DL (ref 6–23)
CALCIUM SERPL-MCNC: 9.4 MG/DL (ref 8.6–10.6)
CHLORIDE SERPL-SCNC: 102 MMOL/L (ref 98–107)
CO2 SERPL-SCNC: 28 MMOL/L (ref 21–32)
CREAT SERPL-MCNC: 0.92 MG/DL (ref 0.5–1.05)
EGFRCR SERPLBLD CKD-EPI 2021: 63 ML/MIN/1.73M*2
EOSINOPHIL # BLD AUTO: 0.13 X10*3/UL (ref 0–0.4)
EOSINOPHIL NFR BLD AUTO: 3.6 %
ERYTHROCYTE [DISTWIDTH] IN BLOOD BY AUTOMATED COUNT: 13.9 % (ref 11.5–14.5)
GLUCOSE SERPL-MCNC: 93 MG/DL (ref 74–99)
HAPTOGLOB SERPL NEPH-MCNC: <30 MG/DL (ref 30–200)
HBV CORE AB SER QL: NONREACTIVE
HBV SURFACE AB SER-ACNC: 3.8 MIU/ML
HBV SURFACE AG SERPL QL IA: NONREACTIVE
HCT VFR BLD AUTO: 30.8 % (ref 36–46)
HGB BLD-MCNC: 9.6 G/DL (ref 12–16)
HGB RETIC QN: 34 PG (ref 28–38)
IMM GRANULOCYTES # BLD AUTO: 0.01 X10*3/UL (ref 0–0.5)
IMM GRANULOCYTES NFR BLD AUTO: 0.3 % (ref 0–0.9)
IMMATURE RETIC FRACTION: 6.8 %
LDH SERPL L TO P-CCNC: 209 U/L (ref 84–246)
LYMPHOCYTES # BLD AUTO: 0.95 X10*3/UL (ref 0.8–3)
LYMPHOCYTES NFR BLD AUTO: 26.3 %
MAGNESIUM SERPL-MCNC: 2.13 MG/DL (ref 1.6–2.4)
MCH RBC QN AUTO: 32.4 PG (ref 26–34)
MCHC RBC AUTO-ENTMCNC: 31.2 G/DL (ref 32–36)
MCV RBC AUTO: 104 FL (ref 80–100)
MONOCYTES # BLD AUTO: 0.31 X10*3/UL (ref 0.05–0.8)
MONOCYTES NFR BLD AUTO: 8.6 %
NEUTROPHILS # BLD AUTO: 2.18 X10*3/UL (ref 1.6–5.5)
NEUTROPHILS NFR BLD AUTO: 60.4 %
NRBC BLD-RTO: 0 /100 WBCS (ref 0–0)
PHOSPHATE SERPL-MCNC: 4.6 MG/DL (ref 2.5–4.9)
PLATELET # BLD AUTO: 242 X10*3/UL (ref 150–450)
POTASSIUM SERPL-SCNC: 4.5 MMOL/L (ref 3.5–5.3)
PROT SERPL-MCNC: 6.5 G/DL (ref 6.4–8.2)
RBC # BLD AUTO: 2.96 X10*6/UL (ref 4–5.2)
RETICS #: 0.05 X10*6/UL (ref 0.02–0.11)
RETICS/RBC NFR AUTO: 1.7 % (ref 0.5–2)
SODIUM SERPL-SCNC: 140 MMOL/L (ref 136–145)
URATE SERPL-MCNC: 3.4 MG/DL (ref 2.3–6.7)
WBC # BLD AUTO: 3.6 X10*3/UL (ref 4.4–11.3)

## 2024-11-18 PROCEDURE — 85045 AUTOMATED RETICULOCYTE COUNT: CPT

## 2024-11-18 PROCEDURE — 83615 LACTATE (LD) (LDH) ENZYME: CPT

## 2024-11-18 PROCEDURE — 84100 ASSAY OF PHOSPHORUS: CPT

## 2024-11-18 PROCEDURE — 84550 ASSAY OF BLOOD/URIC ACID: CPT

## 2024-11-18 PROCEDURE — 86706 HEP B SURFACE ANTIBODY: CPT

## 2024-11-18 PROCEDURE — 36415 COLL VENOUS BLD VENIPUNCTURE: CPT

## 2024-11-18 PROCEDURE — 85025 COMPLETE CBC W/AUTO DIFF WBC: CPT

## 2024-11-18 PROCEDURE — 80053 COMPREHEN METABOLIC PANEL: CPT

## 2024-11-18 PROCEDURE — 83735 ASSAY OF MAGNESIUM: CPT

## 2024-11-18 PROCEDURE — 80061 LIPID PANEL: CPT

## 2024-11-18 PROCEDURE — 82607 VITAMIN B-12: CPT

## 2024-11-18 PROCEDURE — 86704 HEP B CORE ANTIBODY TOTAL: CPT

## 2024-11-18 PROCEDURE — 84443 ASSAY THYROID STIM HORMONE: CPT

## 2024-11-18 PROCEDURE — 87340 HEPATITIS B SURFACE AG IA: CPT

## 2024-11-18 PROCEDURE — 83010 ASSAY OF HAPTOGLOBIN QUANT: CPT

## 2024-11-18 PROCEDURE — 83036 HEMOGLOBIN GLYCOSYLATED A1C: CPT

## 2024-11-21 ENCOUNTER — APPOINTMENT (OUTPATIENT)
Dept: PRIMARY CARE | Facility: CLINIC | Age: 79
End: 2024-11-21
Payer: MEDICARE

## 2024-11-21 VITALS
BODY MASS INDEX: 18.48 KG/M2 | WEIGHT: 124.8 LBS | SYSTOLIC BLOOD PRESSURE: 117 MMHG | HEART RATE: 78 BPM | HEIGHT: 69 IN | DIASTOLIC BLOOD PRESSURE: 63 MMHG

## 2024-11-21 DIAGNOSIS — E06.3 HYPOTHYROIDISM DUE TO HASHIMOTO THYROIDITIS: ICD-10-CM

## 2024-11-21 DIAGNOSIS — I10 PRIMARY HYPERTENSION: ICD-10-CM

## 2024-11-21 DIAGNOSIS — I43 CARDIAC AMYLOIDOSIS: ICD-10-CM

## 2024-11-21 DIAGNOSIS — G63 FAMILIAL TRANSTHYRETIN AMYLOIDOSIS (MULTI): ICD-10-CM

## 2024-11-21 DIAGNOSIS — M06.09 SERONEGATIVE RHEUMATOID ARTHRITIS OF MULTIPLE SITES (MULTI): ICD-10-CM

## 2024-11-21 DIAGNOSIS — D49.0 IPMN (INTRADUCTAL PAPILLARY MUCINOUS NEOPLASM): ICD-10-CM

## 2024-11-21 DIAGNOSIS — E11.9 TYPE 2 DIABETES MELLITUS WITHOUT COMPLICATION, WITHOUT LONG-TERM CURRENT USE OF INSULIN (MULTI): ICD-10-CM

## 2024-11-21 DIAGNOSIS — E85.1 FAMILIAL TRANSTHYRETIN AMYLOIDOSIS (MULTI): ICD-10-CM

## 2024-11-21 DIAGNOSIS — C82.90 FOLLICULAR LYMPHOMA, UNSPECIFIED FOLLICULAR LYMPHOMA TYPE, UNSPECIFIED BODY REGION: ICD-10-CM

## 2024-11-21 DIAGNOSIS — J44.9 CHRONIC OBSTRUCTIVE PULMONARY DISEASE, UNSPECIFIED COPD TYPE (MULTI): ICD-10-CM

## 2024-11-21 DIAGNOSIS — E85.4 CARDIAC AMYLOIDOSIS: ICD-10-CM

## 2024-11-21 DIAGNOSIS — Z00.00 ROUTINE GENERAL MEDICAL EXAMINATION AT HEALTH CARE FACILITY: Primary | ICD-10-CM

## 2024-11-21 DIAGNOSIS — I47.10 PAROXYSMAL SUPRAVENTRICULAR TACHYCARDIA (CMS-HCC): ICD-10-CM

## 2024-11-21 DIAGNOSIS — K86.2 PANCREATIC CYST (HHS-HCC): ICD-10-CM

## 2024-11-21 DIAGNOSIS — D64.9 ANEMIA, UNSPECIFIED TYPE: ICD-10-CM

## 2024-11-21 DIAGNOSIS — E53.8 VITAMIN B12 DEFICIENCY: ICD-10-CM

## 2024-11-21 DIAGNOSIS — F32.2 DEPRESSION, MAJOR, SINGLE EPISODE, SEVERE (MULTI): ICD-10-CM

## 2024-11-21 DIAGNOSIS — E55.9 VITAMIN D DEFICIENCY: ICD-10-CM

## 2024-11-21 LAB
CHOLEST SERPL-MCNC: 205 MG/DL (ref 0–199)
CHOLESTEROL/HDL RATIO: 3
EST. AVERAGE GLUCOSE BLD GHB EST-MCNC: 65 MG/DL
HBA1C MFR BLD: 3.9 %
HDLC SERPL-MCNC: 68.2 MG/DL
LDLC SERPL CALC-MCNC: 120 MG/DL
NON HDL CHOLESTEROL: 137 MG/DL (ref 0–149)
TRIGL SERPL-MCNC: 84 MG/DL (ref 0–149)
TSH SERPL-ACNC: 3.78 MIU/L (ref 0.44–3.98)
VIT B12 SERPL-MCNC: 781 PG/ML (ref 211–911)
VLDL: 17 MG/DL (ref 0–40)

## 2024-11-21 PROCEDURE — 1036F TOBACCO NON-USER: CPT | Performed by: INTERNAL MEDICINE

## 2024-11-21 PROCEDURE — 1123F ACP DISCUSS/DSCN MKR DOCD: CPT | Performed by: INTERNAL MEDICINE

## 2024-11-21 PROCEDURE — G0439 PPPS, SUBSEQ VISIT: HCPCS | Performed by: INTERNAL MEDICINE

## 2024-11-21 PROCEDURE — 3074F SYST BP LT 130 MM HG: CPT | Performed by: INTERNAL MEDICINE

## 2024-11-21 PROCEDURE — 1170F FXNL STATUS ASSESSED: CPT | Performed by: INTERNAL MEDICINE

## 2024-11-21 PROCEDURE — 99214 OFFICE O/P EST MOD 30 MIN: CPT | Performed by: INTERNAL MEDICINE

## 2024-11-21 PROCEDURE — 1159F MED LIST DOCD IN RCRD: CPT | Performed by: INTERNAL MEDICINE

## 2024-11-21 PROCEDURE — 3078F DIAST BP <80 MM HG: CPT | Performed by: INTERNAL MEDICINE

## 2024-11-21 PROCEDURE — 1157F ADVNC CARE PLAN IN RCRD: CPT | Performed by: INTERNAL MEDICINE

## 2024-11-21 RX ORDER — CYANOCOBALAMIN 1000 UG/ML
1000 INJECTION, SOLUTION INTRAMUSCULAR; SUBCUTANEOUS
Qty: 1 ML | Refills: 11 | Status: SHIPPED | OUTPATIENT
Start: 2024-11-21

## 2024-11-21 ASSESSMENT — ENCOUNTER SYMPTOMS
WEAKNESS: 0
NECK PAIN: 0
FATIGUE: 1
HEADACHES: 0
DECREASED CONCENTRATION: 0
NECK STIFFNESS: 0
ARTHRALGIAS: 1
HYPERACTIVE: 0
ADENOPATHY: 0
RHINORRHEA: 0
SLEEP DISTURBANCE: 0
DYSURIA: 0
ABDOMINAL PAIN: 0
OCCASIONAL FEELINGS OF UNSTEADINESS: 0
SINUS PRESSURE: 0
NAUSEA: 0
EYE ITCHING: 0
CHEST TIGHTNESS: 0
SEIZURES: 0
BACK PAIN: 0
ABDOMINAL DISTENTION: 0
FEVER: 0
DIFFICULTY URINATING: 0
COUGH: 0
DIZZINESS: 0
EYE DISCHARGE: 0
BRUISES/BLEEDS EASILY: 0
VOICE CHANGE: 0
SINUS PAIN: 0
DIARRHEA: 0
DYSPHORIC MOOD: 0
ACTIVITY CHANGE: 0
DEPRESSION: 0
FREQUENCY: 1
SHORTNESS OF BREATH: 0
LOSS OF SENSATION IN FEET: 0
MYALGIAS: 1
LIGHT-HEADEDNESS: 0
VOMITING: 0
COLOR CHANGE: 0
APPETITE CHANGE: 0
HEMATURIA: 0
SORE THROAT: 0
WHEEZING: 0
CHILLS: 0
NERVOUS/ANXIOUS: 0
PALPITATIONS: 0
CONSTIPATION: 1

## 2024-11-21 ASSESSMENT — ACTIVITIES OF DAILY LIVING (ADL)
GROCERY_SHOPPING: INDEPENDENT
TAKING_MEDICATION: INDEPENDENT
DRESSING: INDEPENDENT
DOING_HOUSEWORK: INDEPENDENT
MANAGING_FINANCES: INDEPENDENT
BATHING: INDEPENDENT

## 2024-11-21 NOTE — PATIENT INSTRUCTIONS
It was a pleasure seeing you in the office.  We may be able to add lab studies to your most recent lab draw, we will notify you if not.  If you are interested in updating any screenings such as a mammogram or colonoscopy, please let us know.  Thank you for keeping up with routine vaccines and keeping close follow-up with your specialist.  If you have any questions, please contact us.  Otherwise, we are happy to see you back annually for your wellness visits.

## 2024-11-21 NOTE — PROGRESS NOTES
Subjective   Reason for Visit: Adilene Polo is an 79 y.o. female patient comes in today for comprehensive follow-up of medical conditions in conjunction with annual wellness visit    Reviewed all medications by prescribing practitioner or clinical pharmacist (such as prescriptions, OTCs, herbal therapies and supplements) and documented in the medical record.    Ms. Polo comes in today for comprehensive follow-up of medical conditions in conjunction with annual wellness visit, dictated in a separate subsection.  Please refer to that portion of the note for details on healthcare maintenance and screening studies.  She is following with a multitude of specialists, really feeling like things are manageable at this time.  She denies any specific concerns.  She does get constipated, she knows that this could be multifactorial and is trying to drink more water and get fiber in her diet.  She continues to follow with specialist at both  and [facility] facilities.  She is receiving Prolia injections for her osteoporosis.  She continues on thyroid replacement therapy.  She had labs recently updated with still low H&H but essentially stable and is following with her hematologist for this.  She follows with cardiology and pulmonary.  Really symptomatically she is remaining stable.        Patient Care Team:  Ebony Carmona MD as PCP - General (Internal Medicine)  Terry Pandey MD as Consulting Physician (Hematology and Oncology)     Review of Systems   Constitutional:  Positive for fatigue. Negative for activity change, appetite change, chills and fever.   HENT:  Negative for congestion, ear pain, postnasal drip, rhinorrhea, sinus pressure, sinus pain, sneezing, sore throat, tinnitus and voice change.    Eyes:  Negative for discharge, itching and visual disturbance.   Respiratory:  Negative for cough, chest tightness, shortness of breath and wheezing.    Cardiovascular:  Positive for leg swelling. Negative for chest pain and  "palpitations.   Gastrointestinal:  Positive for constipation. Negative for abdominal distention, abdominal pain, diarrhea, nausea and vomiting.   Endocrine: Negative for cold intolerance, heat intolerance and polyuria.   Genitourinary:  Positive for frequency. Negative for difficulty urinating, dysuria, hematuria, urgency, vaginal bleeding and vaginal discharge.   Musculoskeletal:  Positive for arthralgias and myalgias. Negative for back pain, neck pain and neck stiffness.   Skin:  Negative for color change, pallor and rash.   Allergic/Immunologic: Negative for environmental allergies, food allergies and immunocompromised state.   Neurological:  Negative for dizziness, seizures, syncope, weakness, light-headedness and headaches.   Hematological:  Negative for adenopathy. Does not bruise/bleed easily.   Psychiatric/Behavioral:  Negative for behavioral problems, decreased concentration, dysphoric mood and sleep disturbance. The patient is not nervous/anxious and is not hyperactive.        Objective   Vitals:  /63   Pulse 78   Ht 1.753 m (5' 9\")   Wt 56.6 kg (124 lb 12.8 oz)   BMI 18.43 kg/m²       Physical Exam  Constitutional:       General: She is not in acute distress.     Appearance: Normal appearance. She is well-developed. She is not ill-appearing.   HENT:      Head: Normocephalic.      Right Ear: Tympanic membrane, ear canal and external ear normal.      Left Ear: Tympanic membrane, ear canal and external ear normal.      Nose: Nose normal.      Mouth/Throat:      Mouth: Mucous membranes are moist.      Pharynx: Oropharynx is clear. No oropharyngeal exudate or posterior oropharyngeal erythema.   Eyes:      General: Lids are normal. No scleral icterus.     Extraocular Movements: Extraocular movements intact.      Conjunctiva/sclera: Conjunctivae normal.      Pupils: Pupils are equal, round, and reactive to light.   Neck:      Vascular: No carotid bruit.   Cardiovascular:      Rate and Rhythm: Normal " rate and regular rhythm.      Pulses: Normal pulses.      Heart sounds: No murmur heard.     No gallop.   Pulmonary:      Effort: Pulmonary effort is normal. No respiratory distress.      Breath sounds: No wheezing, rhonchi or rales.   Chest:      Comments: Pt declines  Abdominal:      General: Bowel sounds are normal. There is no distension.      Palpations: Abdomen is soft. There is no mass.      Tenderness: There is no abdominal tenderness. There is no guarding or rebound.   Genitourinary:     Comments: PT defers  Musculoskeletal:         General: No swelling or signs of injury.      Cervical back: Normal range of motion and neck supple. No tenderness.      Right lower leg: No edema.      Left lower leg: No edema.   Lymphadenopathy:      Cervical: No cervical adenopathy.   Skin:     General: Skin is warm and dry.      Coloration: Skin is not pale.      Findings: No bruising or rash.   Neurological:      General: No focal deficit present.      Mental Status: She is alert and oriented to person, place, and time.      Cranial Nerves: No cranial nerve deficit.      Motor: No weakness.      Coordination: Coordination normal.      Gait: Gait normal.   Psychiatric:         Mood and Affect: Mood normal.         Behavior: Behavior normal.         Thought Content: Thought content normal.         Judgment: Judgment normal.         Assessment & Plan          Full age-appropriate comprehensive physical exam and health care maintenance performed and discussed today.  Routine safety and preventative measures discussed including self breast exam, seatbelt use, no drinking and driving, no texting and driving, abstinence or cessation of tobacco use, routine dental and vision exams, healthy diet and regular exercise.    We will add some additional labs for the next time she comes in for labs.  She declines mammogram at this time.  Declines colonoscopy, believes she is possibly still up-to-date from about 4 years ago at an outside  facility.  DEXA up-to-date from 2023, followed by rheumatology in an outside facility.  EKG up-to-date.  Monitor at appropriate intervals or based on symptoms.  Keeps up with routine vaccines including annual flu shot, COVID boosters, pneumonia vaccine, shingles vaccine series, and RSV vaccine.  Have counseled regarding tetanus vaccine recommendations.    In addition to the above-mentioned healthcare maintenance and screening studies, the following were discussed and assessed in detail today:  1.  Paroxysmal SVT: Stable symptomatically.  Follows with cardiology.  2.  Hypertension: Excellent control.  BMP remains up-to-date.  3.  Cardiac amyloidosis: Again, follows with cardiology regularly.  4.  Osteoporosis with vitamin D deficiency: Most recent the well-managed.  Follows with rheumatology on Prolia injections.  5.  Hypothyroidism: Continues on Synthroid replacement therapy.  Update TSH, should be able to add to previous lab draw.  6.  Rheumatoid arthritis: Again, follows with rheumatology closely.  7.  Anemia: Following with hematologist.  8.  Follicular lymphoma: Follows with oncology specialist.  9.  Diabetes, not requiring medication therapy.  Update A1c with labs, should be able to be added to most recent lab studies.  10.  IPMN with pancreatic cyst: Follows with specialist closely.    Again, follows with a multitude of specialists, happy to see her annually.  She is to contact us with any questions.

## 2024-11-22 ENCOUNTER — TELEMEDICINE (OUTPATIENT)
Dept: HEMATOLOGY/ONCOLOGY | Facility: CLINIC | Age: 79
End: 2024-11-22
Payer: MEDICARE

## 2024-11-22 DIAGNOSIS — C82.93 FOLLICULAR LYMPHOMA OF INTRA-ABDOMINAL LYMPH NODES, UNSPECIFIED FOLLICULAR LYMPHOMA TYPE: ICD-10-CM

## 2024-11-22 DIAGNOSIS — E85.4 CARDIAC AMYLOIDOSIS: ICD-10-CM

## 2024-11-22 DIAGNOSIS — D59.10 AUTOIMMUNE HEMOLYTIC ANEMIA (MULTI): ICD-10-CM

## 2024-11-22 DIAGNOSIS — D49.0 IPMN (INTRADUCTAL PAPILLARY MUCINOUS NEOPLASM): ICD-10-CM

## 2024-11-22 DIAGNOSIS — C82.96 FOLLICULAR LYMPHOMA OF INTRAPELVIC LYMPH NODES, UNSPECIFIED FOLLICULAR LYMPHOMA TYPE: Primary | ICD-10-CM

## 2024-11-22 DIAGNOSIS — I43 CARDIAC AMYLOIDOSIS: ICD-10-CM

## 2024-11-22 DIAGNOSIS — I10 PRIMARY HYPERTENSION: ICD-10-CM

## 2024-11-22 DIAGNOSIS — E06.3 HYPOTHYROIDISM DUE TO HASHIMOTO THYROIDITIS: ICD-10-CM

## 2024-11-22 PROCEDURE — 1157F ADVNC CARE PLAN IN RCRD: CPT | Performed by: INTERNAL MEDICINE

## 2024-11-22 PROCEDURE — 1123F ACP DISCUSS/DSCN MKR DOCD: CPT | Performed by: INTERNAL MEDICINE

## 2024-11-22 PROCEDURE — 99442 PR PHYS/QHP TELEPHONE EVALUATION 11-20 MIN: CPT | Performed by: INTERNAL MEDICINE

## 2024-11-22 NOTE — PROGRESS NOTES
Patient ID: Adilene Polo is a 79 y.o. female.  Referring Physician: Terry Pandey MD  [address]  [place] [address]  Primary Care Provider: Ebony Carmona MD  Visit Type:  Follow Up     Verbal consent was requested and obtained from patient on this date for a telehealth visit.    Subjective    HPI How was my bloodwork?    Review of Systems   Constitutional: Negative.    HENT:  Negative.     Eyes: Negative.    Respiratory: Negative.     Cardiovascular: Negative.    Gastrointestinal: Negative.    Endocrine: Negative.    Genitourinary: Negative.     Musculoskeletal: Negative.    Skin: Negative.    Neurological: Negative.    Hematological: Negative.    Psychiatric/Behavioral: Negative.          Objective   BSA: There is no height or weight on file to calculate BSA.  There were no vitals taken for this visit.     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   has a past surgical history that includes Cholecystectomy (10/28/2014); Gastric bypass (10/28/2014); Incisional breast biopsy (07/20/2018); Thyroidectomy, partial; and Whipple procedure w/ laparoscopy.  Family History   Problem Relation Name Age of Onset    Heart disease Mother          Coronary    Heart disease Father          Coronary    Heart attack Father      Prostate cancer Father      Breast cancer Sister      Kidney disease Brother      Lung cancer Brother      Prostate cancer  Brother      Pulmonary embolism Brother       Oncology History   Follicular lymphoma   2/5/2023 Initial Diagnosis    Follicular lymphoma (Multi)     6/19/2024 -  Chemotherapy    RiTUXimab (Weekly), 28 Day Cycle          Adilene Polo  reports that she quit smoking about 44 years ago. Her smoking use included cigarettes. She has never used smokeless tobacco.  She  reports no history of alcohol use.  She  reports no history of drug use.    Physical Exam    WBC   Date/Time Value Ref Range Status   11/18/2024 10:35 AM 3.6 (L) 4.4 - 11.3 x10*3/uL Final   09/26/2024 10:05 AM 3.3 (L) 4.4 - 11.3 x10*3/uL Final   07/10/2024 09:33 AM 3.3 (L) 4.4 - 11.3 x10*3/uL Final     nRBC   Date Value Ref Range Status   11/18/2024 0.0 0.0 - 0.0 /100 WBCs Final   09/26/2024 0.0 0.0 - 0.0 /100 WBCs Final   07/10/2024 0.0 0.0 - 0.0 /100 WBCs Final     RBC   Date Value Ref Range Status   11/18/2024 2.96 (L) 4.00 - 5.20 x10*6/uL Final   09/26/2024 3.14 (L) 4.00 - 5.20 x10*6/uL Final   07/10/2024 2.86 (L) 4.00 - 5.20 x10*6/uL Final     Hemoglobin   Date Value Ref Range Status   11/18/2024 9.6 (L) 12.0 - 16.0 g/dL Final   09/26/2024 10.4 (L) 12.0 - 16.0 g/dL Final   07/10/2024 9.6 (L) 12.0 - 16.0 g/dL Final     Hematocrit   Date Value Ref Range Status   11/18/2024 30.8 (L) 36.0 - 46.0 % Final   09/26/2024 33.8 (L) 36.0 - 46.0 % Final   07/10/2024 29.5 (L) 36.0 - 46.0 % Final     MCV   Date/Time Value Ref Range Status   11/18/2024 10:35  (H) 80 - 100 fL Final   09/26/2024 10:05  (H) 80 - 100 fL Final   07/10/2024 09:33  (H) 80 - 100 fL Final     MCH   Date/Time Value Ref Range Status   11/18/2024 10:35 AM 32.4 26.0 - 34.0 pg Final   09/26/2024 10:05 AM 33.1 26.0 - 34.0 pg Final   07/10/2024 09:33 AM 33.6 26.0 - 34.0 pg Final     MCHC   Date/Time Value Ref Range Status   11/18/2024 10:35 AM 31.2 (L) 32.0 - 36.0 g/dL Final   09/26/2024 10:05 AM 30.8 (L) 32.0 - 36.0 g/dL Final   07/10/2024 09:33 AM 32.5 32.0 - 36.0 g/dL Final     RDW  "  Date/Time Value Ref Range Status   11/18/2024 10:35 AM 13.9 11.5 - 14.5 % Final   09/26/2024 10:05 AM 14.6 (H) 11.5 - 14.5 % Final   07/10/2024 09:33 AM 14.8 (H) 11.5 - 14.5 % Final     Platelets   Date/Time Value Ref Range Status   11/18/2024 10:35  150 - 450 x10*3/uL Final   09/26/2024 10:05  150 - 450 x10*3/uL Final   07/10/2024 09:33  150 - 450 x10*3/uL Final     No results found for: \"MPV\"  Neutrophils %   Date/Time Value Ref Range Status   11/18/2024 10:35 AM 60.4 40.0 - 80.0 % Final   07/10/2024 09:33 AM 66.9 40.0 - 80.0 % Final   07/03/2024 09:36 AM 60.6 40.0 - 80.0 % Final     Immature Granulocytes %, Automated   Date/Time Value Ref Range Status   11/18/2024 10:35 AM 0.3 0.0 - 0.9 % Final     Comment:     Immature Granulocyte Count (IG) includes promyelocytes, myelocytes and metamyelocytes but does not include bands. Percent differential counts (%) should be interpreted in the context of the absolute cell counts (cells/UL).   07/10/2024 09:33 AM 0.3 0.0 - 0.9 % Final     Comment:     Immature Granulocyte Count (IG) includes promyelocytes, myelocytes and metamyelocytes but does not include bands. Percent differential counts (%) should be interpreted in the context of the absolute cell counts (cells/UL).   07/03/2024 09:36 AM 0.3 0.0 - 0.9 % Final     Comment:     Immature Granulocyte Count (IG) includes promyelocytes, myelocytes and metamyelocytes but does not include bands. Percent differential counts (%) should be interpreted in the context of the absolute cell counts (cells/UL).     Lymphocytes %   Date/Time Value Ref Range Status   11/18/2024 10:35 AM 26.3 13.0 - 44.0 % Final   07/10/2024 09:33 AM 24.7 13.0 - 44.0 % Final   07/03/2024 09:36 AM 29.2 13.0 - 44.0 % Final     Monocytes %   Date/Time Value Ref Range Status   11/18/2024 10:35 AM 8.6 2.0 - 10.0 % Final   07/10/2024 09:33 AM 6.0 2.0 - 10.0 % Final   07/03/2024 09:36 AM 6.8 2.0 - 10.0 % Final     Eosinophils %   Date/Time " Value Ref Range Status   11/18/2024 10:35 AM 3.6 0.0 - 6.0 % Final   07/10/2024 09:33 AM 0.9 0.0 - 6.0 % Final   07/03/2024 09:36 AM 1.9 0.0 - 6.0 % Final     Basophils %   Date/Time Value Ref Range Status   11/18/2024 10:35 AM 0.8 0.0 - 2.0 % Final   07/10/2024 09:33 AM 1.2 0.0 - 2.0 % Final   07/03/2024 09:36 AM 1.2 0.0 - 2.0 % Final     Neutrophils Absolute   Date/Time Value Ref Range Status   11/18/2024 10:35 AM 2.18 1.60 - 5.50 x10*3/uL Final     Comment:     Percent differential counts (%) should be interpreted in the context of the absolute cell counts (cells/uL).   07/10/2024 09:33 AM 2.22 1.60 - 5.50 x10*3/uL Final     Comment:     Percent differential counts (%) should be interpreted in the context of the absolute cell counts (cells/uL).   07/03/2024 09:36 AM 1.95 1.60 - 5.50 x10*3/uL Final     Comment:     Percent differential counts (%) should be interpreted in the context of the absolute cell counts (cells/uL).     Immature Granulocytes Absolute, Automated   Date/Time Value Ref Range Status   11/18/2024 10:35 AM 0.01 0.00 - 0.50 x10*3/uL Final   07/10/2024 09:33 AM 0.01 0.00 - 0.50 x10*3/uL Final   07/03/2024 09:36 AM 0.01 0.00 - 0.50 x10*3/uL Final     Lymphocytes Absolute   Date/Time Value Ref Range Status   11/18/2024 10:35 AM 0.95 0.80 - 3.00 x10*3/uL Final   07/10/2024 09:33 AM 0.82 0.80 - 3.00 x10*3/uL Final   07/03/2024 09:36 AM 0.94 0.80 - 3.00 x10*3/uL Final     Monocytes Absolute   Date/Time Value Ref Range Status   11/18/2024 10:35 AM 0.31 0.05 - 0.80 x10*3/uL Final   07/10/2024 09:33 AM 0.20 0.05 - 0.80 x10*3/uL Final   07/03/2024 09:36 AM 0.22 0.05 - 0.80 x10*3/uL Final     Eosinophils Absolute   Date/Time Value Ref Range Status   11/18/2024 10:35 AM 0.13 0.00 - 0.40 x10*3/uL Final   07/10/2024 09:33 AM 0.03 0.00 - 0.40 x10*3/uL Final   07/03/2024 09:36 AM 0.06 0.00 - 0.40 x10*3/uL Final     Basophils Absolute   Date/Time Value Ref Range Status   11/18/2024 10:35 AM 0.03 0.00 - 0.10  "x10*3/uL Final   07/10/2024 09:33 AM 0.04 0.00 - 0.10 x10*3/uL Final   2024 09:36 AM 0.04 0.00 - 0.10 x10*3/uL Final       No components found for: \"PT\"  aPTT   Date/Time Value Ref Range Status   2022 01:40 PM 33 26 - 39 sec Final     Comment:       THE APTT IS NO LONGER USED FOR MONITORING     UNFRACTIONATED HEPARIN THERAPY.    FOR MONITORING HEPARIN THERAPY,     USE THE HEPARIN ASSAY.     2021 08:16 AM 33 25 - 35 sec Final     Comment:       THE APTT IS NO LONGER USED FOR MONITORING     UNFRACTIONATED HEPARIN THERAPY.    FOR MONITORING HEPARIN THERAPY,     USE THE HEPARIN ASSAY.     2020 04:34 PM 63 (H) 28 - 38 sec Final     Comment:       THE APTT IS NO LONGER USED FOR MONITORING     UNFRACTIONATED HEPARIN THERAPY.    FOR MONITORING HEPARIN THERAPY,     USE THE HEPARIN ASSAY.       Medication Documentation Review Audit       Reviewed by Sheryl Calvo MA (Medical Assistant) on 24 at 1327      Medication Order Taking? Sig Documenting Provider Last Dose Status   b complex 0.4 mg tablet [id number] No Take 1 tablet by mouth once daily. Historical Provider, MD Taking Active   blood sugar diagnostic (Accu-Chek SmartView Test Strip) strip [id number] No USE 1 STRIP TWICE DAILY. Historical Provider, MD Taking Active   blood-glucose meter misc [id number] No Check sugars daily Ebony Carmona MD Taking Active   CALCIUM ORAL [id number] No Take 1 tablet by mouth 1 (one) time each day. Historical Provider, MD Taking Active   cyanocobalamin (Vitamin B-12) 1,000 mcg/mL injection [id number] No Inject 1 mL (1,000 mcg) into the muscle every 30 (thirty) days. Ebony Carmona MD Taking Active   denosumab (Prolia) 60 mg/mL syringe [id number] No Inject 1 mL (60 mg) under the skin every 6 months. Historical Provider, MD Taking Active   diphenhydrAMINE (BENADryl) 50 mg capsule [id number]  Take one capsule (50 mg) by mouth one hour before contrast medium administration Terry Pandey MD   24 2359   docusate sodium " (STOOL SOFTENER ORAL) [id number] No Take 1 capsule by mouth 1 (one) time each day. Historical Provider, MD Taking Active   gabapentin (Neurontin) 300 mg capsule [id number]  Take 2 capsules (600 mg) by mouth once daily at bedtime. Raman Saavedra MD  Active   levothyroxine (Synthroid, Levoxyl) 100 mcg tablet [id number] No Take 1 tablet (100 mcg) by mouth once daily in the morning. Take before meals. Ebony Carmona MD Taking Active   magnesium oxide (Mag-Ox) 250 mg magnesium tablet [id number] No Take 1 tablet (250 mg) by mouth 2 times a day. Historical Provider, MD Taking Active   metoprolol succinate XL (Toprol-XL) 25 mg 24 hr tablet [id number] No TAKE 1 TABLET BY MOUTH EVERY DAY Mychal R MD Irma Taking Active   NON FORMULARY [id number] No Vitamin D 50 MCG (2000 UT) Oral Tablet; Take 1 tablet daily Historical Provider, MD Taking Active   prochlorperazine (Compazine) 10 mg tablet [id number] No Take 1 tablet (10 mg) by mouth every 6 hours if needed for nausea or vomiting.   Patient not taking: Reported on 9/30/2024    Terry Pandey MD Not Taking Active   sulfaSALAzine (Azulfidine) 500 mg tablet [id number] No Take 2 tablets (1,000 mg) by mouth 2 times a day. 1,000 mg in the morning and 1500mg at night Historical Provider, MD Taking Active   tafamidis (Vyndamax) 61 mg capsule [id number] No Take 1 capsule (61 mg) by mouth once daily. Dejan Fortune DO Taking Active   vitamins A and D capsule [id number] No Take 3,000 Units by mouth every other day. Shai Cerda MD Taking Active   vutrisiran sodium (AMVUTTRA SUBQ) [id number] No Inject under the skin. Historical Provider, MD Taking Active   WHEAT DEXTRIN ORAL [id number] No Benefiber Oral Powder; Use as directed Historical Provider, MD Taking Active   zinc gluconate 50 mg tablet [id number] No Take 1 tablet (50 mg) by mouth once daily. 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 found incidentally on  Whipple resection (20+ lymph nodes), 5% focus of grade 3B lymphoma found in 1 lymph node  -bone marrow bx done in 2022 was negative, however, that doesn't completely prove her marrow was not involved due to sampling variant; nearly all patients with follicular lymphoma ultimately have marrow involvement  --8/15/2023 PET scan reviewed--left para-aortic lymph node (secondary to lymphoma) is slightly enlarged and slightly PET avid  -reviewed 11/13/2023 CT scan--her para-aortic nodes are enlarging  -she is feeling more and more fatigued as times goes on--some of it clearly is due to her cardiac amyloidosis, however, her lymphoma and anemia clearly are contributing to her fatigue as well  -labs done on 4/25/2024 reviewed--wbc 3.6, hgb 10, , plt 237,000, , SPEP/IF negative, serum kappa LC 2.85, TIBC 317, sat 40%, ferritin 354, retic 1.4%, direct milena negative, haptoglobin <10  -treating even with a course of rituxan may very likely help with her anemia  -here for interval followup via telephone  -completed a course of rituxan (6/19, 6/26, 7/3 and 7/10/2024)  -labs done on 8/5/2024 reviewed (somehow [facility] lab missed CBC, COMP, and LDH orders)--haptoglobin 63, retic 1.2%, TIBC 298, sat 39%, ferritin 333  -labs now do not seem to be consistent with hemolysis  -CT scan done on 8/7/2024 reviewed--multiple scattered punctate 1-2 mm nodules throughout the lungs; densely calcified granuloma in the LLL; no mediastinal hilar or axillary lymphadenopathy; diffuse fatty infiltration of the liver; post Whipple procedure; spleen is normal in size; mild diffuse mesenteric haziness is again noted; interval decrease in retroperitoneal lymphadenopathy; left para-aortic node was 20 x 19 mm now 15 x 12; another node was 18 x 15 mm now 15 x 11  -here for interval followup via telephone  -labs done on 11/18/2024 included CBC + COMP + LDH + haptoglobin + retic  -results reviewed--wbc 3.6, hgb 9.6, , plt 242,000, ANC 2180,  creatinine 0.92, calcium 9,4, alk phos 177, AST 29, ALT 20, , haptoglobin <30, retic 1.7%  -she is more anemic again, with high MCV, undetectable haptoglobin--strongly suggestive that her lymphoma is inducing hemolysis again  -she now would for sure benefit from maintenance rituximab  -will place orders for next dose to be given at [facility] in early December  -benefits, risks, potential morbidity related to rituximab were reviewed with Adilene and she provided informed consent to proceed  -she will receive tylenol PO + benadryl IV + famoitidine  IV + decadron IV + rituximab 375 mg/m2 IV  -she will receive maintenance rituxan Q8 weeks        2) cardiac amyloidosis  -follows with Dr Romero  -on vyndamax  -also recently started 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

## 2024-12-01 PROBLEM — D59.10 AUTOIMMUNE HEMOLYTIC ANEMIA (MULTI): Status: ACTIVE | Noted: 2024-12-01

## 2024-12-01 RX ORDER — DIPHENHYDRAMINE HYDROCHLORIDE 50 MG/ML
50 INJECTION INTRAMUSCULAR; INTRAVENOUS AS NEEDED
OUTPATIENT
Start: 2024-12-09

## 2024-12-01 RX ORDER — DIPHENHYDRAMINE HYDROCHLORIDE 50 MG/ML
50 INJECTION INTRAMUSCULAR; INTRAVENOUS AS NEEDED
OUTPATIENT
Start: 2025-02-03

## 2024-12-01 RX ORDER — ALBUTEROL SULFATE 0.83 MG/ML
3 SOLUTION RESPIRATORY (INHALATION) AS NEEDED
OUTPATIENT
Start: 2025-02-03

## 2024-12-01 RX ORDER — FAMOTIDINE 10 MG/ML
20 INJECTION INTRAVENOUS ONCE AS NEEDED
OUTPATIENT
Start: 2024-12-09

## 2024-12-01 RX ORDER — EPINEPHRINE 0.3 MG/.3ML
0.3 INJECTION SUBCUTANEOUS EVERY 5 MIN PRN
OUTPATIENT
Start: 2025-02-03

## 2024-12-01 RX ORDER — ALBUTEROL SULFATE 0.83 MG/ML
3 SOLUTION RESPIRATORY (INHALATION) AS NEEDED
OUTPATIENT
Start: 2024-12-09

## 2024-12-01 RX ORDER — DIPHENHYDRAMINE HYDROCHLORIDE 50 MG/ML
50 INJECTION INTRAMUSCULAR; INTRAVENOUS ONCE
OUTPATIENT
Start: 2024-12-09 | End: 2024-12-09

## 2024-12-01 RX ORDER — FAMOTIDINE 10 MG/ML
20 INJECTION INTRAVENOUS ONCE
OUTPATIENT
Start: 2024-12-09 | End: 2024-12-09

## 2024-12-01 RX ORDER — PROCHLORPERAZINE MALEATE 10 MG
10 TABLET ORAL EVERY 6 HOURS PRN
OUTPATIENT
Start: 2025-02-03

## 2024-12-01 RX ORDER — PROCHLORPERAZINE EDISYLATE 5 MG/ML
10 INJECTION INTRAMUSCULAR; INTRAVENOUS EVERY 6 HOURS PRN
OUTPATIENT
Start: 2024-12-09

## 2024-12-01 RX ORDER — DEXAMETHASONE IN 0.9 % SOD CHL 20 MG/50ML
20 INTRAVENOUS SOLUTION, PIGGYBACK (ML) INTRAVENOUS ONCE
Status: CANCELLED | OUTPATIENT
Start: 2024-12-09 | End: 2024-12-09

## 2024-12-01 RX ORDER — ACETAMINOPHEN 325 MG/1
650 TABLET ORAL ONCE
OUTPATIENT
Start: 2025-02-03

## 2024-12-01 RX ORDER — PROCHLORPERAZINE EDISYLATE 5 MG/ML
10 INJECTION INTRAMUSCULAR; INTRAVENOUS EVERY 6 HOURS PRN
OUTPATIENT
Start: 2025-02-03

## 2024-12-01 RX ORDER — EPINEPHRINE 0.3 MG/.3ML
0.3 INJECTION SUBCUTANEOUS EVERY 5 MIN PRN
OUTPATIENT
Start: 2024-12-09

## 2024-12-01 RX ORDER — PROCHLORPERAZINE MALEATE 10 MG
10 TABLET ORAL EVERY 6 HOURS PRN
OUTPATIENT
Start: 2024-12-09

## 2024-12-01 RX ORDER — FAMOTIDINE 10 MG/ML
20 INJECTION INTRAVENOUS ONCE AS NEEDED
OUTPATIENT
Start: 2025-02-03

## 2024-12-01 RX ORDER — DIPHENHYDRAMINE HYDROCHLORIDE 50 MG/ML
50 INJECTION INTRAMUSCULAR; INTRAVENOUS ONCE
OUTPATIENT
Start: 2025-02-03 | End: 2025-02-03

## 2024-12-01 RX ORDER — ACETAMINOPHEN 325 MG/1
650 TABLET ORAL ONCE
OUTPATIENT
Start: 2024-12-09

## 2024-12-01 RX ORDER — DEXAMETHASONE IN 0.9 % SOD CHL 20 MG/50ML
20 INTRAVENOUS SOLUTION, PIGGYBACK (ML) INTRAVENOUS ONCE
Status: CANCELLED | OUTPATIENT
Start: 2025-02-03 | End: 2025-02-03

## 2024-12-01 RX ORDER — FAMOTIDINE 10 MG/ML
20 INJECTION INTRAVENOUS ONCE
OUTPATIENT
Start: 2025-02-03 | End: 2025-02-03

## 2024-12-01 ASSESSMENT — ENCOUNTER SYMPTOMS
GASTROINTESTINAL NEGATIVE: 1
HEMATOLOGIC/LYMPHATIC NEGATIVE: 1
EYES NEGATIVE: 1
CARDIOVASCULAR NEGATIVE: 1
MUSCULOSKELETAL NEGATIVE: 1
RESPIRATORY NEGATIVE: 1
CONSTITUTIONAL NEGATIVE: 1
PSYCHIATRIC NEGATIVE: 1
ENDOCRINE NEGATIVE: 1
NEUROLOGICAL NEGATIVE: 1

## 2024-12-02 RX ORDER — LEVOTHYROXINE SODIUM 88 UG/1
88 TABLET ORAL DAILY
Qty: 90 TABLET | Refills: 3 | OUTPATIENT
Start: 2024-12-02

## 2024-12-06 DIAGNOSIS — I47.10 SUPRAVENTRICULAR TACHYCARDIA (CMS-HCC): ICD-10-CM

## 2024-12-06 RX ORDER — METOPROLOL SUCCINATE 25 MG/1
25 TABLET, EXTENDED RELEASE ORAL DAILY
Qty: 90 TABLET | Refills: 3 | Status: SHIPPED | OUTPATIENT
Start: 2024-12-06

## 2024-12-09 ENCOUNTER — INFUSION (OUTPATIENT)
Dept: HEMATOLOGY/ONCOLOGY | Facility: HOSPITAL | Age: 79
End: 2024-12-09
Payer: MEDICARE

## 2024-12-09 ENCOUNTER — APPOINTMENT (OUTPATIENT)
Dept: INFUSION THERAPY | Facility: HOSPITAL | Age: 79
End: 2024-12-09
Payer: MEDICARE

## 2024-12-09 VITALS
DIASTOLIC BLOOD PRESSURE: 46 MMHG | HEART RATE: 61 BPM | WEIGHT: 125.88 LBS | TEMPERATURE: 97.7 F | SYSTOLIC BLOOD PRESSURE: 128 MMHG | BODY MASS INDEX: 18.59 KG/M2 | RESPIRATION RATE: 18 BRPM | OXYGEN SATURATION: 99 %

## 2024-12-09 DIAGNOSIS — C82.93 FOLLICULAR LYMPHOMA OF INTRA-ABDOMINAL LYMPH NODES, UNSPECIFIED FOLLICULAR LYMPHOMA TYPE: ICD-10-CM

## 2024-12-09 DIAGNOSIS — D59.10 AUTOIMMUNE HEMOLYTIC ANEMIA (MULTI): ICD-10-CM

## 2024-12-09 PROCEDURE — 2500000004 HC RX 250 GENERAL PHARMACY W/ HCPCS (ALT 636 FOR OP/ED): Performed by: INTERNAL MEDICINE

## 2024-12-09 PROCEDURE — 96415 CHEMO IV INFUSION ADDL HR: CPT

## 2024-12-09 PROCEDURE — 2500000001 HC RX 250 WO HCPCS SELF ADMINISTERED DRUGS (ALT 637 FOR MEDICARE OP): Performed by: INTERNAL MEDICINE

## 2024-12-09 PROCEDURE — 96375 TX/PRO/DX INJ NEW DRUG ADDON: CPT | Mod: INF

## 2024-12-09 PROCEDURE — 96413 CHEMO IV INFUSION 1 HR: CPT

## 2024-12-09 RX ORDER — FAMOTIDINE 10 MG/ML
20 INJECTION INTRAVENOUS ONCE
Status: COMPLETED | OUTPATIENT
Start: 2024-12-09 | End: 2024-12-09

## 2024-12-09 RX ORDER — HEPARIN 100 UNIT/ML
500 SYRINGE INTRAVENOUS AS NEEDED
OUTPATIENT
Start: 2024-12-09

## 2024-12-09 RX ORDER — DIPHENHYDRAMINE HYDROCHLORIDE 50 MG/ML
50 INJECTION INTRAMUSCULAR; INTRAVENOUS ONCE
Status: COMPLETED | OUTPATIENT
Start: 2024-12-09 | End: 2024-12-09

## 2024-12-09 RX ORDER — HEPARIN SODIUM,PORCINE/PF 10 UNIT/ML
50 SYRINGE (ML) INTRAVENOUS AS NEEDED
OUTPATIENT
Start: 2024-12-09

## 2024-12-09 RX ORDER — ACETAMINOPHEN 325 MG/1
650 TABLET ORAL ONCE
Status: COMPLETED | OUTPATIENT
Start: 2024-12-09 | End: 2024-12-09

## 2024-12-09 NOTE — PROGRESS NOTES
Patient presents today for C1D1 of maintenance rituximab. Last dose 7/10/24. She denies any new complaints at this visit and feels well. Received ordered infusion without incidence and tolerated well. Ambulated off the unit independently in stable condition with a printout of most recent labs and upcoming appt schedule.

## 2024-12-09 NOTE — PROGRESS NOTES
Rituximab rates 100mg/hr    55 ml/hr  27 ml vol    2. 109 ml/hr  55 ml vol    3. 164 ml/hr  82 ml vol    4. 218 ml/hr  Remaining volume    Rates verified with HOLLY RN

## 2024-12-12 DIAGNOSIS — I43 CARDIAC AMYLOIDOSIS: ICD-10-CM

## 2024-12-12 DIAGNOSIS — E85.4 CARDIAC AMYLOIDOSIS: ICD-10-CM

## 2025-01-29 ENCOUNTER — LAB (OUTPATIENT)
Dept: LAB | Facility: HOSPITAL | Age: 80
End: 2025-01-29
Payer: MEDICARE

## 2025-01-29 DIAGNOSIS — C82.93 FOLLICULAR LYMPHOMA OF INTRA-ABDOMINAL LYMPH NODES, UNSPECIFIED FOLLICULAR LYMPHOMA TYPE: ICD-10-CM

## 2025-01-29 DIAGNOSIS — D59.10 AUTOIMMUNE HEMOLYTIC ANEMIA (MULTI): ICD-10-CM

## 2025-01-29 LAB
ALBUMIN SERPL BCP-MCNC: 4.2 G/DL (ref 3.4–5)
ALP SERPL-CCNC: 153 U/L (ref 33–136)
ALT SERPL W P-5'-P-CCNC: 39 U/L (ref 7–45)
ANION GAP SERPL CALC-SCNC: 14 MMOL/L (ref 10–20)
AST SERPL W P-5'-P-CCNC: 47 U/L (ref 9–39)
BASOPHILS # BLD AUTO: 0.02 X10*3/UL (ref 0–0.1)
BASOPHILS NFR BLD AUTO: 0.5 %
BILIRUB SERPL-MCNC: 0.9 MG/DL (ref 0–1.2)
BUN SERPL-MCNC: 35 MG/DL (ref 6–23)
CALCIUM SERPL-MCNC: 9.4 MG/DL (ref 8.6–10.6)
CHLORIDE SERPL-SCNC: 104 MMOL/L (ref 98–107)
CO2 SERPL-SCNC: 26 MMOL/L (ref 21–32)
CREAT SERPL-MCNC: 0.98 MG/DL (ref 0.5–1.05)
EGFRCR SERPLBLD CKD-EPI 2021: 59 ML/MIN/1.73M*2
EOSINOPHIL # BLD AUTO: 0.04 X10*3/UL (ref 0–0.4)
EOSINOPHIL NFR BLD AUTO: 1 %
ERYTHROCYTE [DISTWIDTH] IN BLOOD BY AUTOMATED COUNT: 14.4 % (ref 11.5–14.5)
GLUCOSE SERPL-MCNC: 96 MG/DL (ref 74–99)
HCT VFR BLD AUTO: 32.8 % (ref 36–46)
HGB BLD-MCNC: 10.4 G/DL (ref 12–16)
HOLD SPECIMEN: NORMAL
IMM GRANULOCYTES # BLD AUTO: 0.01 X10*3/UL (ref 0–0.5)
IMM GRANULOCYTES NFR BLD AUTO: 0.2 % (ref 0–0.9)
LDH SERPL L TO P-CCNC: 235 U/L (ref 84–246)
LYMPHOCYTES # BLD AUTO: 0.94 X10*3/UL (ref 0.8–3)
LYMPHOCYTES NFR BLD AUTO: 22.3 %
MCH RBC QN AUTO: 32.7 PG (ref 26–34)
MCHC RBC AUTO-ENTMCNC: 31.7 G/DL (ref 32–36)
MCV RBC AUTO: 103 FL (ref 80–100)
MONOCYTES # BLD AUTO: 0.36 X10*3/UL (ref 0.05–0.8)
MONOCYTES NFR BLD AUTO: 8.6 %
NEUTROPHILS # BLD AUTO: 2.84 X10*3/UL (ref 1.6–5.5)
NEUTROPHILS NFR BLD AUTO: 67.4 %
NRBC BLD-RTO: 0 /100 WBCS (ref 0–0)
PLATELET # BLD AUTO: 198 X10*3/UL (ref 150–450)
POTASSIUM SERPL-SCNC: 3.8 MMOL/L (ref 3.5–5.3)
PROT SERPL-MCNC: 6.5 G/DL (ref 6.4–8.2)
RBC # BLD AUTO: 3.18 X10*6/UL (ref 4–5.2)
SODIUM SERPL-SCNC: 140 MMOL/L (ref 136–145)
URATE SERPL-MCNC: 4.2 MG/DL (ref 2.3–6.7)
WBC # BLD AUTO: 4.2 X10*3/UL (ref 4.4–11.3)

## 2025-01-29 PROCEDURE — 84550 ASSAY OF BLOOD/URIC ACID: CPT

## 2025-01-29 PROCEDURE — 85025 COMPLETE CBC W/AUTO DIFF WBC: CPT

## 2025-01-29 PROCEDURE — 83615 LACTATE (LD) (LDH) ENZYME: CPT

## 2025-01-29 PROCEDURE — 80053 COMPREHEN METABOLIC PANEL: CPT

## 2025-02-03 ENCOUNTER — APPOINTMENT (OUTPATIENT)
Dept: NEUROLOGY | Facility: CLINIC | Age: 80
End: 2025-02-03
Payer: MEDICARE

## 2025-02-03 ENCOUNTER — TELEMEDICINE (OUTPATIENT)
Dept: HEMATOLOGY/ONCOLOGY | Facility: CLINIC | Age: 80
End: 2025-02-03
Payer: MEDICARE

## 2025-02-03 VITALS — DIASTOLIC BLOOD PRESSURE: 81 MMHG | SYSTOLIC BLOOD PRESSURE: 156 MMHG | HEART RATE: 78 BPM

## 2025-02-03 DIAGNOSIS — G63 FAMILIAL TRANSTHYRETIN AMYLOIDOSIS (MULTI): Primary | ICD-10-CM

## 2025-02-03 DIAGNOSIS — E85.4 CARDIAC AMYLOIDOSIS: ICD-10-CM

## 2025-02-03 DIAGNOSIS — E85.1 FAMILIAL TRANSTHYRETIN AMYLOIDOSIS (MULTI): Primary | ICD-10-CM

## 2025-02-03 DIAGNOSIS — D49.0 IPMN (INTRADUCTAL PAPILLARY MUCINOUS NEOPLASM): ICD-10-CM

## 2025-02-03 DIAGNOSIS — I43 CARDIAC AMYLOIDOSIS: ICD-10-CM

## 2025-02-03 DIAGNOSIS — M79.2 NEUROPATHIC PAIN: ICD-10-CM

## 2025-02-03 DIAGNOSIS — D59.10 AUTOIMMUNE HEMOLYTIC ANEMIA (MULTI): Primary | ICD-10-CM

## 2025-02-03 DIAGNOSIS — C82.93 FOLLICULAR LYMPHOMA OF INTRA-ABDOMINAL LYMPH NODES, UNSPECIFIED FOLLICULAR LYMPHOMA TYPE: ICD-10-CM

## 2025-02-03 DIAGNOSIS — E06.3 HYPOTHYROIDISM DUE TO HASHIMOTO THYROIDITIS: ICD-10-CM

## 2025-02-03 DIAGNOSIS — I10 PRIMARY HYPERTENSION: ICD-10-CM

## 2025-02-03 PROCEDURE — 3077F SYST BP >= 140 MM HG: CPT | Performed by: PSYCHIATRY & NEUROLOGY

## 2025-02-03 PROCEDURE — 1123F ACP DISCUSS/DSCN MKR DOCD: CPT | Performed by: PSYCHIATRY & NEUROLOGY

## 2025-02-03 PROCEDURE — 1157F ADVNC CARE PLAN IN RCRD: CPT | Performed by: PSYCHIATRY & NEUROLOGY

## 2025-02-03 PROCEDURE — 3079F DIAST BP 80-89 MM HG: CPT | Performed by: PSYCHIATRY & NEUROLOGY

## 2025-02-03 PROCEDURE — 99214 OFFICE O/P EST MOD 30 MIN: CPT | Performed by: PSYCHIATRY & NEUROLOGY

## 2025-02-03 RX ORDER — GABAPENTIN 300 MG/1
600 CAPSULE ORAL NIGHTLY
Qty: 180 CAPSULE | Refills: 3 | Status: SHIPPED | OUTPATIENT
Start: 2025-02-03 | End: 2026-02-03

## 2025-02-03 RX ORDER — ACETAMINOPHEN 325 MG/1
650 TABLET ORAL ONCE
OUTPATIENT
Start: 2025-04-01

## 2025-02-03 RX ORDER — EPINEPHRINE 0.3 MG/.3ML
0.3 INJECTION SUBCUTANEOUS EVERY 5 MIN PRN
OUTPATIENT
Start: 2025-04-01

## 2025-02-03 RX ORDER — PROCHLORPERAZINE EDISYLATE 5 MG/ML
10 INJECTION INTRAMUSCULAR; INTRAVENOUS EVERY 6 HOURS PRN
OUTPATIENT
Start: 2025-04-01

## 2025-02-03 RX ORDER — DIPHENHYDRAMINE HYDROCHLORIDE 50 MG/ML
50 INJECTION INTRAMUSCULAR; INTRAVENOUS ONCE
OUTPATIENT
Start: 2025-04-01 | End: 2025-04-01

## 2025-02-03 RX ORDER — PROCHLORPERAZINE MALEATE 10 MG
10 TABLET ORAL EVERY 6 HOURS PRN
OUTPATIENT
Start: 2025-04-01

## 2025-02-03 RX ORDER — FAMOTIDINE 10 MG/ML
20 INJECTION INTRAVENOUS ONCE
OUTPATIENT
Start: 2025-04-01 | End: 2025-04-01

## 2025-02-03 RX ORDER — DIPHENHYDRAMINE HYDROCHLORIDE 50 MG/ML
50 INJECTION INTRAMUSCULAR; INTRAVENOUS AS NEEDED
OUTPATIENT
Start: 2025-04-01

## 2025-02-03 RX ORDER — ALBUTEROL SULFATE 0.83 MG/ML
3 SOLUTION RESPIRATORY (INHALATION) AS NEEDED
OUTPATIENT
Start: 2025-04-01

## 2025-02-03 RX ORDER — FAMOTIDINE 10 MG/ML
20 INJECTION INTRAVENOUS ONCE AS NEEDED
OUTPATIENT
Start: 2025-04-01

## 2025-02-03 ASSESSMENT — ENCOUNTER SYMPTOMS
ENDOCRINE NEGATIVE: 1
NEUROLOGICAL NEGATIVE: 1
PSYCHIATRIC NEGATIVE: 1
EYES NEGATIVE: 1
GASTROINTESTINAL NEGATIVE: 1
HEMATOLOGIC/LYMPHATIC NEGATIVE: 1
CARDIOVASCULAR NEGATIVE: 1
RESPIRATORY NEGATIVE: 1
CONSTITUTIONAL NEGATIVE: 1
MUSCULOSKELETAL NEGATIVE: 1

## 2025-02-03 NOTE — PROGRESS NOTES
Neuromuscular Medicine Follow Up     Adilene Polo, MRN: [id number], : 1945     Primary Care Physician: Ebony Carmona MD     Impression/Plan:   Impression:  Adilene Polo is a 79 y.o. female with ATTR amyloidosis with neuropathy who presents for follow up. Her ATTR amyloidosis manifested as a peripheral neuropathy and cardiomyopathy. She is taking oral tafamadis and subcutaneous vutrisiran both of which she is tolerating without any side effects. She has not noticed any significant improvement or worsening of her neuropathy since initiation of vitisiran. On exam she has a significant peripheral polyneuropathy with prominent distal weakness. We discussed the expectation following initiation of treatment with vutisiran. We would expect her neuropathy to halt in progression (of which it seems like it may have) and to potentially improve. It may still be too early to see improvement as nerve regeneration is a slow process in general. Recommend no changes in management at this time.    Plan:  -Continue tafamadis per Dr. Fortune  -Continue subcutaneous vutrisiran every 3 months  -Continue gabapentin 600 mg at bedtime  -Continue vitamin A supplementation (2400 mcg every other day)    Reese Villarreal MD  Neuromuscular Fellow     ATTENDING NOTE - RAMAN DOE M.D.    I saw patient with trainee and agree with the edits, history and exam that I helped formulate per above.      Raman Doe M.D., F.A.C.P.   Director, Neuromuscular Center & EMG laboratory   [facility]   [facility]   Professor of Neurology   [facility] [employer]    The total appointment time today was 30 minutes. Time included preparing to see the patient, obtaining the history, performing a medically necessary appropriate physical examination, counseling and educating the patient and documenting clinical information in the medical record.        History of Present Illness:     Ms. Polo is a 79 y.o. female with ATTR amyloidosis with neuropathy who presents for follow up.     Interval history:  Patient reports that things have been about the same since last being seen. Has not had any medical updates in the interim. Is taking rituximab for non-Hodgkins lymphoma and that is going fine. The tafamadis and vutrisiran are going well without any side effects. Hands and legs are about the same without any change. Feels like balance is still off but not worse. Has no other new concerns or symptoms.    Current medications:  Tafamadis 61 mg once daily  Vutrisiran subcutaneous  Vitamin A 2400 mcg every other day    Prior history:   To recap, her neuropathic symptoms started in early 2024 with mostly symmetric upper and lower extremity sensory disturbance along with allodynia in the feet. She also has proximal and distal weakness slightly worse in the left hand and bilateral hip flexors. Reflexes is trace to absent. She has multiple other sings of ATTR including cardiac disease (CHF), bilateral carpal tunnel, right biceps tear, lightheadedness and syncope once, dry mouth and skin, and alternating diarrhea and constipation. 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 EMG confirms evidence of a generalized severe nonlength-dependent axonal polyneuropathy. he likely has family history of this, in her mother and brothers however they were not tested. Her daughter however has also tested positive for ATTR but is asymptomatic.     Relevant past medical, surgical, family, and social histories, along with ROS was reviewed and pertinent details noted above.     Allergies   Allergen Reactions    Cephalosporins Anaphylaxis    Penicillins Anaphylaxis    Ace Inhibitors Cough    Atorvastatin Unknown     Elevated LFT    Iodinated Contrast Media Unknown      Medications:    Current Outpatient Medications:     b complex 0.4 mg  tablet, Take 1 tablet by mouth once daily., Disp: , Rfl:     blood sugar diagnostic (Accu-Chek SmartView Test Strip) strip, USE 1 STRIP TWICE DAILY., Disp: , Rfl:     blood-glucose meter misc, Check sugars daily, Disp: 1 each, Rfl: 0    CALCIUM ORAL, Take 1 tablet by mouth 1 (one) time each day., Disp: , Rfl:     cyanocobalamin (Vitamin B-12) 1,000 mcg/mL injection, Inject 1 mL (1,000 mcg) into the muscle every 30 (thirty) days., Disp: 1 mL, Rfl: 11    denosumab (Prolia) 60 mg/mL syringe, Inject 1 mL (60 mg) under the skin every 6 months., Disp: , Rfl:     diphenhydrAMINE (BENADryl) 50 mg capsule, Take one capsule (50 mg) by mouth one hour before contrast medium administration, Disp: 1 capsule, Rfl: 0    docusate sodium (STOOL SOFTENER ORAL), Take 1 capsule by mouth 1 (one) time each day., Disp: , Rfl:     gabapentin (Neurontin) 300 mg capsule, Take 2 capsules (600 mg) by mouth once daily at bedtime., Disp: 180 capsule, Rfl: 3    levothyroxine (Synthroid, Levoxyl) 100 mcg tablet, Take 1 tablet (100 mcg) by mouth once daily in the morning. Take before meals., Disp: 90 tablet, Rfl: 1    magnesium oxide (Mag-Ox) 250 mg magnesium tablet, Take 1 tablet (250 mg) by mouth 2 times a day., Disp: , Rfl:     metoprolol succinate XL (Toprol-XL) 25 mg 24 hr tablet, TAKE 1 TABLET BY MOUTH EVERY DAY, Disp: 90 tablet, Rfl: 3    NON FORMULARY, Vitamin D 50 MCG (2000 UT) Oral Tablet; Take 1 tablet daily, Disp: , Rfl:     prochlorperazine (Compazine) 10 mg tablet, Take 1 tablet (10 mg) by mouth every 6 hours if needed for nausea or vomiting. (Patient not taking: Reported on 9/30/2024), Disp: 30 tablet, Rfl: 5    sulfaSALAzine (Azulfidine) 500 mg tablet, Take 2 tablets (1,000 mg) by mouth 2 times a day. 1,000 mg in the morning and 1500mg at night, Disp: , Rfl:     tafamidis (Vyndamax) 61 mg capsule, Take 1 capsule (61 mg) by mouth once daily., Disp: 90 capsule, Rfl: 3    vitamins A and D capsule, Take 3,000 Units by mouth every other  day., Disp: 15 each, Rfl: 11    vutrisiran sodium (AMVUTTRA SUBQ), Inject under the skin., Disp: , Rfl:     WHEAT DEXTRIN ORAL, Benefiber Oral Powder; Use as directed, Disp: , Rfl:     zinc gluconate 50 mg tablet, Take 1 tablet (50 mg) by mouth once daily., Disp: , Rfl:        Physical Exam:   /81   Pulse 78      General Appearance:  No distress, alert, interactive and cooperative   Neurological:  Mental status: the patient provided an accurate history, language was normal   Cranial Nerves:  CN 2   Visual fields full to confrontation   CN 3, 4, 6   Lids symmetric; no ptosis  EOMs fully intact  No nystagmus   CN 5   Facial sensation intact bilaterally   CN 7   Normal and symmetric facial strength; nasolabial folds symmetric   CN 8   Hearing intact to conversation    CN 9, 10   Palate elevates symmetrically   Phonation within normal limits, no dysarthria  CN 11   Shoulder shrug 5/5 bilaterally  CN 12   Tongue midline, with normal bulk and strength; no fasciculations     Motor:   Muscle bulk: Significant bilateral intrinsic hand atrophy  Movements: No tremors or other abnormal movement    Manual Muscle Testing (MMT) reveals the following MRC grades:  R L   Shoulder abduction  5 5  Elbow flexion   5 5  Elbow extension  5- 5-  Wrist flexion   5 5  Wrist extension  5 5  Finger extension  4 4  Finger abduction  3+ 3+  Finger adduction  3+ 3+  Thumb abduction   3 2  Hip flexion   4 4  Knee flexion   5- 5-  Knee extension  5 5  Ankle dorsiflexion  4 4  Ankle plantarflexion  4 4  Big toe extension  5 5  Toe flexion   5 5    Reflexes:     R          L  BR:               0          0  Biceps:         0          0  Knee:           1          1  Ankle:          0          0    Sensory:   Decreased sensation to pinprick in BUE and BLE in a distal to proximal gradient; reports sensation is 40-50% in bilateral feet, 40% in right hand, 50% in right forearm, 80% in left hand, 60% in left forearm  Absent vibratory sense at  bilateral big toes; diminished at bilateral ankles, knees, 2nd digits in hands, elbows, and shoulders    Coordination:    In both upper extremities, finger-nose-finger was intact without dysmetria or overshoot     Gait:   Wide based gait    Results:     No new results reviewed today

## 2025-02-03 NOTE — PROGRESS NOTES
Patient ID: Adilene Polo is a 79 y.o. female.  Referring Physician: Terry Pandey MD  [address]  [place] [address]  Primary Care Provider: Ebony Carmona MD  Visit Type:  Follow Up     Verbal consent was requested and obtained from patient on this date for a telehealth visit.    Subjective    HPI I am getting another dose of rituxan tomorrow  How was my labwork?    Review of Systems   Constitutional: Negative.    HENT:  Negative.     Eyes: Negative.    Respiratory: Negative.     Cardiovascular: Negative.    Gastrointestinal: Negative.    Endocrine: Negative.    Genitourinary: Negative.     Musculoskeletal: Negative.    Skin: Negative.    Neurological: Negative.    Hematological: Negative.    Psychiatric/Behavioral: Negative.          Objective   BSA: There is no height or weight on file to calculate BSA.  There were no vitals taken for this visit.     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   has a past surgical history that includes Cholecystectomy (10/28/2014); Gastric bypass (10/28/2014); Incisional breast biopsy (07/20/2018); Thyroidectomy, partial; and Whipple procedure w/ laparoscopy.  Family History   Problem Relation Name Age of Onset    Heart disease Mother          Coronary    Heart disease Father          Coronary    Heart attack Father      Prostate cancer Father      Breast cancer Sister      Kidney disease Brother       Lung cancer Brother      Prostate cancer Brother      Pulmonary embolism Brother       Oncology History   Follicular lymphoma   2/5/2023 Initial Diagnosis    Follicular lymphoma (Multi)     6/19/2024 - 7/10/2024 Chemotherapy    RiTUXimab (Weekly), 28 Day Cycle      12/9/2024 -  Chemotherapy    RiTUXimab, 8 Week Cycles - Maintenance         Adilene MARK Polo  reports that she quit smoking about 45 years ago. Her smoking use included cigarettes. She has never used smokeless tobacco.  She  reports no history of alcohol use.  She  reports no history of drug use.    Physical Exam    WBC   Date/Time Value Ref Range Status   01/29/2025 11:32 AM 4.2 (L) 4.4 - 11.3 x10*3/uL Final   11/18/2024 10:35 AM 3.6 (L) 4.4 - 11.3 x10*3/uL Final   09/26/2024 10:05 AM 3.3 (L) 4.4 - 11.3 x10*3/uL Final     nRBC   Date Value Ref Range Status   01/29/2025 0.0 0.0 - 0.0 /100 WBCs Final   11/18/2024 0.0 0.0 - 0.0 /100 WBCs Final   09/26/2024 0.0 0.0 - 0.0 /100 WBCs Final     RBC   Date Value Ref Range Status   01/29/2025 3.18 (L) 4.00 - 5.20 x10*6/uL Final   11/18/2024 2.96 (L) 4.00 - 5.20 x10*6/uL Final   09/26/2024 3.14 (L) 4.00 - 5.20 x10*6/uL Final     Hemoglobin   Date Value Ref Range Status   01/29/2025 10.4 (L) 12.0 - 16.0 g/dL Final   11/18/2024 9.6 (L) 12.0 - 16.0 g/dL Final   09/26/2024 10.4 (L) 12.0 - 16.0 g/dL Final     Hematocrit   Date Value Ref Range Status   01/29/2025 32.8 (L) 36.0 - 46.0 % Final   11/18/2024 30.8 (L) 36.0 - 46.0 % Final   09/26/2024 33.8 (L) 36.0 - 46.0 % Final     MCV   Date/Time Value Ref Range Status   01/29/2025 11:32  (H) 80 - 100 fL Final   11/18/2024 10:35  (H) 80 - 100 fL Final   09/26/2024 10:05  (H) 80 - 100 fL Final     MCH   Date/Time Value Ref Range Status   01/29/2025 11:32 AM 32.7 26.0 - 34.0 pg Final   11/18/2024 10:35 AM 32.4 26.0 - 34.0 pg Final   09/26/2024 10:05 AM 33.1 26.0 - 34.0 pg Final     MCHC   Date/Time Value Ref Range Status   01/29/2025 11:32 AM 31.7 (L) 32.0 - 36.0  "g/dL Final   11/18/2024 10:35 AM 31.2 (L) 32.0 - 36.0 g/dL Final   09/26/2024 10:05 AM 30.8 (L) 32.0 - 36.0 g/dL Final     RDW   Date/Time Value Ref Range Status   01/29/2025 11:32 AM 14.4 11.5 - 14.5 % Final   11/18/2024 10:35 AM 13.9 11.5 - 14.5 % Final   09/26/2024 10:05 AM 14.6 (H) 11.5 - 14.5 % Final     Platelets   Date/Time Value Ref Range Status   01/29/2025 11:32  150 - 450 x10*3/uL Final   11/18/2024 10:35  150 - 450 x10*3/uL Final   09/26/2024 10:05  150 - 450 x10*3/uL Final     No results found for: \"MPV\"  Neutrophils %   Date/Time Value Ref Range Status   01/29/2025 11:32 AM 67.4 40.0 - 80.0 % Final   11/18/2024 10:35 AM 60.4 40.0 - 80.0 % Final   07/10/2024 09:33 AM 66.9 40.0 - 80.0 % Final     Immature Granulocytes %, Automated   Date/Time Value Ref Range Status   01/29/2025 11:32 AM 0.2 0.0 - 0.9 % Final     Comment:     Immature Granulocyte Count (IG) includes promyelocytes, myelocytes and metamyelocytes but does not include bands. Percent differential counts (%) should be interpreted in the context of the absolute cell counts (cells/UL).   11/18/2024 10:35 AM 0.3 0.0 - 0.9 % Final     Comment:     Immature Granulocyte Count (IG) includes promyelocytes, myelocytes and metamyelocytes but does not include bands. Percent differential counts (%) should be interpreted in the context of the absolute cell counts (cells/UL).   07/10/2024 09:33 AM 0.3 0.0 - 0.9 % Final     Comment:     Immature Granulocyte Count (IG) includes promyelocytes, myelocytes and metamyelocytes but does not include bands. Percent differential counts (%) should be interpreted in the context of the absolute cell counts (cells/UL).     Lymphocytes %   Date/Time Value Ref Range Status   01/29/2025 11:32 AM 22.3 13.0 - 44.0 % Final   11/18/2024 10:35 AM 26.3 13.0 - 44.0 % Final   07/10/2024 09:33 AM 24.7 13.0 - 44.0 % Final     Monocytes %   Date/Time Value Ref Range Status   01/29/2025 11:32 AM 8.6 2.0 - 10.0 % Final "   11/18/2024 10:35 AM 8.6 2.0 - 10.0 % Final   07/10/2024 09:33 AM 6.0 2.0 - 10.0 % Final     Eosinophils %   Date/Time Value Ref Range Status   01/29/2025 11:32 AM 1.0 0.0 - 6.0 % Final   11/18/2024 10:35 AM 3.6 0.0 - 6.0 % Final   07/10/2024 09:33 AM 0.9 0.0 - 6.0 % Final     Basophils %   Date/Time Value Ref Range Status   01/29/2025 11:32 AM 0.5 0.0 - 2.0 % Final   11/18/2024 10:35 AM 0.8 0.0 - 2.0 % Final   07/10/2024 09:33 AM 1.2 0.0 - 2.0 % Final     Neutrophils Absolute   Date/Time Value Ref Range Status   01/29/2025 11:32 AM 2.84 1.60 - 5.50 x10*3/uL Final     Comment:     Percent differential counts (%) should be interpreted in the context of the absolute cell counts (cells/uL).   11/18/2024 10:35 AM 2.18 1.60 - 5.50 x10*3/uL Final     Comment:     Percent differential counts (%) should be interpreted in the context of the absolute cell counts (cells/uL).   07/10/2024 09:33 AM 2.22 1.60 - 5.50 x10*3/uL Final     Comment:     Percent differential counts (%) should be interpreted in the context of the absolute cell counts (cells/uL).     Immature Granulocytes Absolute, Automated   Date/Time Value Ref Range Status   01/29/2025 11:32 AM 0.01 0.00 - 0.50 x10*3/uL Final   11/18/2024 10:35 AM 0.01 0.00 - 0.50 x10*3/uL Final   07/10/2024 09:33 AM 0.01 0.00 - 0.50 x10*3/uL Final     Lymphocytes Absolute   Date/Time Value Ref Range Status   01/29/2025 11:32 AM 0.94 0.80 - 3.00 x10*3/uL Final   11/18/2024 10:35 AM 0.95 0.80 - 3.00 x10*3/uL Final   07/10/2024 09:33 AM 0.82 0.80 - 3.00 x10*3/uL Final     Monocytes Absolute   Date/Time Value Ref Range Status   01/29/2025 11:32 AM 0.36 0.05 - 0.80 x10*3/uL Final   11/18/2024 10:35 AM 0.31 0.05 - 0.80 x10*3/uL Final   07/10/2024 09:33 AM 0.20 0.05 - 0.80 x10*3/uL Final     Eosinophils Absolute   Date/Time Value Ref Range Status   01/29/2025 11:32 AM 0.04 0.00 - 0.40 x10*3/uL Final   11/18/2024 10:35 AM 0.13 0.00 - 0.40 x10*3/uL Final   07/10/2024 09:33 AM 0.03 0.00 - 0.40  "x10*3/uL Final     Basophils Absolute   Date/Time Value Ref Range Status   01/29/2025 11:32 AM 0.02 0.00 - 0.10 x10*3/uL Final   11/18/2024 10:35 AM 0.03 0.00 - 0.10 x10*3/uL Final   07/10/2024 09:33 AM 0.04 0.00 - 0.10 x10*3/uL Final       No components found for: \"PT\"  aPTT   Date/Time Value Ref Range Status   08/09/2022 01:40 PM 33 26 - 39 sec Final     Comment:       THE APTT IS NO LONGER USED FOR MONITORING     UNFRACTIONATED HEPARIN THERAPY.    FOR MONITORING HEPARIN THERAPY,     USE THE HEPARIN ASSAY.     06/17/2021 08:16 AM 33 25 - 35 sec Final     Comment:       THE APTT IS NO LONGER USED FOR MONITORING     UNFRACTIONATED HEPARIN THERAPY.    FOR MONITORING HEPARIN THERAPY,     USE THE HEPARIN ASSAY.     03/02/2020 04:34 PM 63 (H) 28 - 38 sec Final     Comment:       THE APTT IS NO LONGER USED FOR MONITORING     UNFRACTIONATED HEPARIN THERAPY.    FOR MONITORING HEPARIN THERAPY,     USE THE HEPARIN ASSAY.       Medication Documentation Review Audit       Reviewed by Sheryl Calvo MA (Medical Assistant) on 11/21/24 at 1327      Medication Order Taking? Sig Documenting Provider Last Dose Status   b complex 0.4 mg tablet [id number] No Take 1 tablet by mouth once daily. Historical Provider, MD Taking Active   blood sugar diagnostic (Accu-Chek SmartView Test Strip) strip [id number] No USE 1 STRIP TWICE DAILY. Historical MD Sylvia Taking Active   blood-glucose meter misc [id number] No Check sugars daily Ebony Carmona MD Taking Active   CALCIUM ORAL [id number] No Take 1 tablet by mouth 1 (one) time each day. Historical Provider, MD Taking Active   cyanocobalamin (Vitamin B-12) 1,000 mcg/mL injection [id number] No Inject 1 mL (1,000 mcg) into the muscle every 30 (thirty) days. Ebony Carmona MD Taking Active   denosumab (Prolia) 60 mg/mL syringe [id number] No Inject 1 mL (60 mg) under the skin every 6 months. Historical Provider, MD Taking Active   diphenhydrAMINE (BENADryl) 50 mg capsule [id number]  Take one capsule (50 mg) by " mouth one hour before contrast medium administration Terry Pandey MD   24 2359   docusate sodium (STOOL SOFTENER ORAL) [id number] No Take 1 capsule by mouth 1 (one) time each day. Historical Provider, MD Taking Active   gabapentin (Neurontin) 300 mg capsule [id number]  Take 2 capsules (600 mg) by mouth once daily at bedtime. Raman Saavedra MD  Active   levothyroxine (Synthroid, Levoxyl) 100 mcg tablet [id number] No Take 1 tablet (100 mcg) by mouth once daily in the morning. Take before meals. Ebony Carmona MD Taking Active   magnesium oxide (Mag-Ox) 250 mg magnesium tablet [id number] No Take 1 tablet (250 mg) by mouth 2 times a day. Historical Provider, MD Taking Active   metoprolol succinate XL (Toprol-XL) 25 mg 24 hr tablet [id number] No TAKE 1 TABLET BY MOUTH EVERY DAY Mychal ANDERSON Solis MD Taking Active   NON FORMULARY [id number] No Vitamin D 50 MCG (2000) Oral Tablet; Take 1 tablet daily Historical Provider, MD Taking Active   prochlorperazine (Compazine) 10 mg tablet [id number] No Take 1 tablet (10 mg) by mouth every 6 hours if needed for nausea or vomiting.   Patient not taking: Reported on 2024    Terry Pandey MD Not Taking Active   sulfaSALAzine (Azulfidine) 500 mg tablet [id number] No Take 2 tablets (1,000 mg) by mouth 2 times a day. 1,000 mg in the morning and 1500mg at night Historical Provider, MD Taking Active   tafamidis (Vyndamax) 61 mg capsule [id number] No Take 1 capsule (61 mg) by mouth once daily. Dejan Fortune DO Taking Active   vitamins A and D capsule [id number] No Take 3,000 Units by mouth every other day. Shai Cerda MD Taking Active   vutrisiran sodium (AMVUTTRA SUBQ) [id number] No Inject under the skin. Historical Provider, MD Taking Active   WHEAT DEXTRIN ORAL [id number] No Benefiber Oral Powder; Use as directed Historical MD Sylvia Taking Active   zinc gluconate 50 mg tablet [id number] No Take 1 tablet (50 mg) by mouth once daily. 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 (20+ lymph nodes), 5% focus of grade 3B lymphoma found in 1 lymph node  -bone marrow bx done in 2022 was negative, however, that doesn't completely prove her marrow was not involved due to sampling variant; nearly all patients with follicular lymphoma ultimately have marrow involvement  --8/15/2023 PET scan reviewed--left para-aortic lymph node (secondary to lymphoma) is slightly enlarged and slightly PET avid  -reviewed 11/13/2023 CT scan--her para-aortic nodes are enlarging  -she is feeling more and more fatigued as times goes on--some of it clearly is due to her cardiac amyloidosis, however, her lymphoma and anemia clearly are contributing to her fatigue as well  -labs done on 4/25/2024 reviewed--wbc 3.6, hgb 10, , plt 237,000, , SPEP/IF negative, serum kappa LC 2.85, TIBC 317, sat 40%, ferritin 354, retic 1.4%, direct milena negative, haptoglobin <10  -treating even with a course of rituxan may very likely help with her anemia  -here for interval followup via telephone  -completed a course of rituxan (6/19, 6/26, 7/3 and 7/10/2024)  -labs done on 8/5/2024 reviewed (somehow [facility] [place] missed CBC, COMP, and LDH orders)--haptoglobin 63, retic 1.2%, TIBC 298, sat 39%, ferritin 333  -labs now do not seem to be consistent with hemolysis  -CT scan done on 8/7/2024 reviewed--multiple scattered punctate 1-2 mm nodules throughout the lungs; densely calcified granuloma in the LLL; no mediastinal hilar or axillary lymphadenopathy; diffuse fatty infiltration of the liver; post Whipple procedure; spleen is normal in size; mild diffuse mesenteric haziness is again noted; interval decrease in retroperitoneal lymphadenopathy; left para-aortic node was 20 x 19 mm now 15 x 12; another node was 18 x 15 mm now 15 x 11  -here for interval followup via telephone  -labs done on 11/18/2024  included CBC + COMP + LDH + haptoglobin + retic  -results reviewed--wbc 3.6, hgb 9.6, , plt 242,000, ANC 2180, creatinine 0.92, calcium 9,4, alk phos 177, AST 29, ALT 20, , haptoglobin <30, retic 1.7%  -she is more anemic again, with high MCV, undetectable haptoglobin--strongly suggestive that her lymphoma is inducing hemolysis again  -she now would for sure benefit from maintenance rituximab  -here for interval followup via telephone  -[place] [facility] [place] [facility] [place]  -next [place] [facility] [place]  [place]labs done on 1/29/2025 included CBC + COMP + LDH + uric acid  -results reviewed--wbc 4.2, hgb 10.4, , plt 198,000, ANC 2840, creatinine 0.98, calcium 9.4, alk phos 153, AST 47, total bili 0.9, ALT 39, , uric acid 4.2  -benefits, risks, potential morbidity related to rituximab were reviewed with Adilene and she provided informed consent to proceed  -tomorrow she will receive tylenol PO + benadryl IV + famoitidine  IV + decadron PO + rituximab 375 mg/m2 IV  -next dose rituxan due in 8-9 weeks, but will recheck labs again 1-2 days prior        2) cardiac amyloidosis  -follows with Dr Romero  -on vyndamax  -also recently started 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

## 2025-02-04 ENCOUNTER — INFUSION (OUTPATIENT)
Dept: HEMATOLOGY/ONCOLOGY | Facility: HOSPITAL | Age: 80
End: 2025-02-04
Payer: MEDICARE

## 2025-02-04 VITALS
DIASTOLIC BLOOD PRESSURE: 68 MMHG | BODY MASS INDEX: 17.71 KG/M2 | RESPIRATION RATE: 20 BRPM | TEMPERATURE: 99 F | SYSTOLIC BLOOD PRESSURE: 132 MMHG | OXYGEN SATURATION: 99 % | HEIGHT: 70 IN | HEART RATE: 95 BPM | WEIGHT: 123.68 LBS

## 2025-02-04 DIAGNOSIS — D59.10 AUTOIMMUNE HEMOLYTIC ANEMIA (MULTI): ICD-10-CM

## 2025-02-04 DIAGNOSIS — C82.93 FOLLICULAR LYMPHOMA OF INTRA-ABDOMINAL LYMPH NODES, UNSPECIFIED FOLLICULAR LYMPHOMA TYPE: ICD-10-CM

## 2025-02-04 PROCEDURE — 2500000001 HC RX 250 WO HCPCS SELF ADMINISTERED DRUGS (ALT 637 FOR MEDICARE OP): Performed by: INTERNAL MEDICINE

## 2025-02-04 PROCEDURE — 96375 TX/PRO/DX INJ NEW DRUG ADDON: CPT | Mod: INF

## 2025-02-04 PROCEDURE — 2500000004 HC RX 250 GENERAL PHARMACY W/ HCPCS (ALT 636 FOR OP/ED): Performed by: INTERNAL MEDICINE

## 2025-02-04 PROCEDURE — 96413 CHEMO IV INFUSION 1 HR: CPT

## 2025-02-04 PROCEDURE — 96415 CHEMO IV INFUSION ADDL HR: CPT

## 2025-02-04 PROCEDURE — 2500000004 HC RX 250 GENERAL PHARMACY W/ HCPCS (ALT 636 FOR OP/ED): Mod: JZ,TB | Performed by: INTERNAL MEDICINE

## 2025-02-04 RX ORDER — DIPHENHYDRAMINE HYDROCHLORIDE 50 MG/ML
50 INJECTION INTRAMUSCULAR; INTRAVENOUS AS NEEDED
Status: DISCONTINUED | OUTPATIENT
Start: 2025-02-04 | End: 2025-02-04 | Stop reason: HOSPADM

## 2025-02-04 RX ORDER — ACETAMINOPHEN 325 MG/1
650 TABLET ORAL ONCE
Status: COMPLETED | OUTPATIENT
Start: 2025-02-04 | End: 2025-02-04

## 2025-02-04 RX ORDER — FAMOTIDINE 10 MG/ML
20 INJECTION INTRAVENOUS ONCE
Status: COMPLETED | OUTPATIENT
Start: 2025-02-04 | End: 2025-02-04

## 2025-02-04 RX ORDER — PROCHLORPERAZINE MALEATE 10 MG
10 TABLET ORAL EVERY 6 HOURS PRN
Status: DISCONTINUED | OUTPATIENT
Start: 2025-02-04 | End: 2025-02-04 | Stop reason: HOSPADM

## 2025-02-04 RX ORDER — FAMOTIDINE 10 MG/ML
20 INJECTION INTRAVENOUS ONCE AS NEEDED
Status: DISCONTINUED | OUTPATIENT
Start: 2025-02-04 | End: 2025-02-04 | Stop reason: HOSPADM

## 2025-02-04 RX ORDER — ALBUTEROL SULFATE 0.83 MG/ML
3 SOLUTION RESPIRATORY (INHALATION) AS NEEDED
Status: DISCONTINUED | OUTPATIENT
Start: 2025-02-04 | End: 2025-02-04 | Stop reason: HOSPADM

## 2025-02-04 RX ORDER — EPINEPHRINE 0.3 MG/.3ML
0.3 INJECTION SUBCUTANEOUS EVERY 5 MIN PRN
Status: DISCONTINUED | OUTPATIENT
Start: 2025-02-04 | End: 2025-02-04 | Stop reason: HOSPADM

## 2025-02-04 RX ORDER — DIPHENHYDRAMINE HYDROCHLORIDE 50 MG/ML
50 INJECTION INTRAMUSCULAR; INTRAVENOUS ONCE
Status: COMPLETED | OUTPATIENT
Start: 2025-02-04 | End: 2025-02-04

## 2025-02-04 RX ORDER — PROCHLORPERAZINE EDISYLATE 5 MG/ML
10 INJECTION INTRAMUSCULAR; INTRAVENOUS EVERY 6 HOURS PRN
Status: DISCONTINUED | OUTPATIENT
Start: 2025-02-04 | End: 2025-02-04 | Stop reason: HOSPADM

## 2025-02-04 RX ADMIN — FAMOTIDINE 20 MG: 10 INJECTION INTRAVENOUS at 08:43

## 2025-02-04 RX ADMIN — SODIUM CHLORIDE 600 MG: 9 INJECTION, SOLUTION INTRAVENOUS at 09:14

## 2025-02-04 RX ADMIN — DIPHENHYDRAMINE HYDROCHLORIDE 50 MG: 50 INJECTION INTRAMUSCULAR; INTRAVENOUS at 08:43

## 2025-02-04 RX ADMIN — DEXAMETHASONE 20 MG: 6 TABLET ORAL at 08:43

## 2025-02-04 RX ADMIN — ACETAMINOPHEN 650 MG: 325 TABLET ORAL at 08:43

## 2025-02-11 ENCOUNTER — APPOINTMENT (OUTPATIENT)
Dept: RADIOLOGY | Facility: HOSPITAL | Age: 80
End: 2025-02-11
Payer: MEDICARE

## 2025-02-11 ENCOUNTER — CLINICAL SUPPORT (OUTPATIENT)
Dept: EMERGENCY MEDICINE | Facility: HOSPITAL | Age: 80
End: 2025-02-11
Payer: MEDICARE

## 2025-02-11 ENCOUNTER — HOSPITAL ENCOUNTER (EMERGENCY)
Facility: HOSPITAL | Age: 80
Discharge: HOME | End: 2025-02-11
Attending: EMERGENCY MEDICINE
Payer: MEDICARE

## 2025-02-11 VITALS
DIASTOLIC BLOOD PRESSURE: 56 MMHG | BODY MASS INDEX: 17.77 KG/M2 | HEIGHT: 69 IN | TEMPERATURE: 97.7 F | SYSTOLIC BLOOD PRESSURE: 130 MMHG | OXYGEN SATURATION: 95 % | HEART RATE: 62 BPM | RESPIRATION RATE: 16 BRPM | WEIGHT: 120 LBS

## 2025-02-11 DIAGNOSIS — R91.1 LUNG NODULE: Primary | ICD-10-CM

## 2025-02-11 DIAGNOSIS — S32.82XA MULTIPLE CLOSED FRACTURES OF PELVIS WITHOUT DISRUPTION OF PELVIC RING, INITIAL ENCOUNTER (MULTI): ICD-10-CM

## 2025-02-11 LAB
ALBUMIN SERPL BCP-MCNC: 4.2 G/DL (ref 3.4–5)
ALP SERPL-CCNC: 135 U/L (ref 33–136)
ALT SERPL W P-5'-P-CCNC: 22 U/L (ref 7–45)
ANION GAP SERPL CALC-SCNC: 14 MMOL/L (ref 10–20)
AST SERPL W P-5'-P-CCNC: 28 U/L (ref 9–39)
BASOPHILS # BLD AUTO: 0.03 X10*3/UL (ref 0–0.1)
BASOPHILS NFR BLD AUTO: 0.5 %
BILIRUB SERPL-MCNC: 1.1 MG/DL (ref 0–1.2)
BUN SERPL-MCNC: 35 MG/DL (ref 6–23)
CALCIUM SERPL-MCNC: 9.5 MG/DL (ref 8.6–10.6)
CARDIAC TROPONIN I PNL SERPL HS: 23 NG/L (ref 0–34)
CARDIAC TROPONIN I PNL SERPL HS: 25 NG/L (ref 0–34)
CHLORIDE SERPL-SCNC: 101 MMOL/L (ref 98–107)
CO2 SERPL-SCNC: 27 MMOL/L (ref 21–32)
CREAT SERPL-MCNC: 0.78 MG/DL (ref 0.5–1.05)
EGFRCR SERPLBLD CKD-EPI 2021: 77 ML/MIN/1.73M*2
EOSINOPHIL # BLD AUTO: 0.01 X10*3/UL (ref 0–0.4)
EOSINOPHIL NFR BLD AUTO: 0.2 %
ERYTHROCYTE [DISTWIDTH] IN BLOOD BY AUTOMATED COUNT: 14 % (ref 11.5–14.5)
GLUCOSE SERPL-MCNC: 110 MG/DL (ref 74–99)
HCT VFR BLD AUTO: 31.3 % (ref 36–46)
HGB BLD-MCNC: 10.6 G/DL (ref 12–16)
IMM GRANULOCYTES # BLD AUTO: 0.04 X10*3/UL (ref 0–0.5)
IMM GRANULOCYTES NFR BLD AUTO: 0.7 % (ref 0–0.9)
LYMPHOCYTES # BLD AUTO: 0.66 X10*3/UL (ref 0.8–3)
LYMPHOCYTES NFR BLD AUTO: 11.5 %
MCH RBC QN AUTO: 33.2 PG (ref 26–34)
MCHC RBC AUTO-ENTMCNC: 33.9 G/DL (ref 32–36)
MCV RBC AUTO: 98 FL (ref 80–100)
MONOCYTES # BLD AUTO: 0.45 X10*3/UL (ref 0.05–0.8)
MONOCYTES NFR BLD AUTO: 7.9 %
NEUTROPHILS # BLD AUTO: 4.53 X10*3/UL (ref 1.6–5.5)
NEUTROPHILS NFR BLD AUTO: 79.2 %
NRBC BLD-RTO: 0 /100 WBCS (ref 0–0)
PLATELET # BLD AUTO: 217 X10*3/UL (ref 150–450)
POTASSIUM SERPL-SCNC: 4.3 MMOL/L (ref 3.5–5.3)
PROT SERPL-MCNC: 6.5 G/DL (ref 6.4–8.2)
RBC # BLD AUTO: 3.19 X10*6/UL (ref 4–5.2)
SODIUM SERPL-SCNC: 138 MMOL/L (ref 136–145)
WBC # BLD AUTO: 5.7 X10*3/UL (ref 4.4–11.3)

## 2025-02-11 PROCEDURE — 84484 ASSAY OF TROPONIN QUANT: CPT

## 2025-02-11 PROCEDURE — 73564 X-RAY EXAM KNEE 4 OR MORE: CPT | Mod: RIGHT SIDE | Performed by: RADIOLOGY

## 2025-02-11 PROCEDURE — 73000 X-RAY EXAM OF COLLAR BONE: CPT | Mod: RT

## 2025-02-11 PROCEDURE — 73502 X-RAY EXAM HIP UNI 2-3 VIEWS: CPT | Mod: RT

## 2025-02-11 PROCEDURE — 72125 CT NECK SPINE W/O DYE: CPT | Performed by: RADIOLOGY

## 2025-02-11 PROCEDURE — 72192 CT PELVIS W/O DYE: CPT | Performed by: RADIOLOGY

## 2025-02-11 PROCEDURE — 73552 X-RAY EXAM OF FEMUR 2/>: CPT | Mod: RIGHT SIDE | Performed by: RADIOLOGY

## 2025-02-11 PROCEDURE — 70450 CT HEAD/BRAIN W/O DYE: CPT

## 2025-02-11 PROCEDURE — 93005 ELECTROCARDIOGRAM TRACING: CPT

## 2025-02-11 PROCEDURE — 73564 X-RAY EXAM KNEE 4 OR MORE: CPT | Mod: RT

## 2025-02-11 PROCEDURE — 72190 X-RAY EXAM OF PELVIS: CPT

## 2025-02-11 PROCEDURE — 72192 CT PELVIS W/O DYE: CPT

## 2025-02-11 PROCEDURE — 72125 CT NECK SPINE W/O DYE: CPT

## 2025-02-11 PROCEDURE — 99285 EMERGENCY DEPT VISIT HI MDM: CPT | Performed by: EMERGENCY MEDICINE

## 2025-02-11 PROCEDURE — 72190 X-RAY EXAM OF PELVIS: CPT | Performed by: RADIOLOGY

## 2025-02-11 PROCEDURE — 93010 ELECTROCARDIOGRAM REPORT: CPT | Performed by: EMERGENCY MEDICINE

## 2025-02-11 PROCEDURE — 71046 X-RAY EXAM CHEST 2 VIEWS: CPT

## 2025-02-11 PROCEDURE — 73502 X-RAY EXAM HIP UNI 2-3 VIEWS: CPT | Mod: RIGHT SIDE | Performed by: RADIOLOGY

## 2025-02-11 PROCEDURE — 73552 X-RAY EXAM OF FEMUR 2/>: CPT | Mod: RT

## 2025-02-11 PROCEDURE — 84075 ASSAY ALKALINE PHOSPHATASE: CPT

## 2025-02-11 PROCEDURE — 73000 X-RAY EXAM OF COLLAR BONE: CPT | Mod: RIGHT SIDE | Performed by: RADIOLOGY

## 2025-02-11 PROCEDURE — 36415 COLL VENOUS BLD VENIPUNCTURE: CPT

## 2025-02-11 PROCEDURE — 71046 X-RAY EXAM CHEST 2 VIEWS: CPT | Performed by: RADIOLOGY

## 2025-02-11 PROCEDURE — 99285 EMERGENCY DEPT VISIT HI MDM: CPT | Mod: 25 | Performed by: EMERGENCY MEDICINE

## 2025-02-11 PROCEDURE — 70450 CT HEAD/BRAIN W/O DYE: CPT | Performed by: RADIOLOGY

## 2025-02-11 PROCEDURE — 85025 COMPLETE CBC W/AUTO DIFF WBC: CPT

## 2025-02-11 PROCEDURE — 2500000001 HC RX 250 WO HCPCS SELF ADMINISTERED DRUGS (ALT 637 FOR MEDICARE OP)

## 2025-02-11 RX ORDER — ACETAMINOPHEN 325 MG/1
650 TABLET ORAL ONCE
Status: COMPLETED | OUTPATIENT
Start: 2025-02-11 | End: 2025-02-11

## 2025-02-11 RX ADMIN — ACETAMINOPHEN 650 MG: 325 TABLET ORAL at 15:29

## 2025-02-11 ASSESSMENT — PAIN - FUNCTIONAL ASSESSMENT: PAIN_FUNCTIONAL_ASSESSMENT: 0-10

## 2025-02-11 ASSESSMENT — LIFESTYLE VARIABLES
HAVE PEOPLE ANNOYED YOU BY CRITICIZING YOUR DRINKING: NO
EVER HAD A DRINK FIRST THING IN THE MORNING TO STEADY YOUR NERVES TO GET RID OF A HANGOVER: NO
HAVE YOU EVER FELT YOU SHOULD CUT DOWN ON YOUR DRINKING: NO
TOTAL SCORE: 0
EVER FELT BAD OR GUILTY ABOUT YOUR DRINKING: NO

## 2025-02-11 ASSESSMENT — PAIN SCALES - GENERAL
PAINLEVEL_OUTOF10: 2
PAINLEVEL_OUTOF10: 3
PAINLEVEL_OUTOF10: 4

## 2025-02-11 ASSESSMENT — COLUMBIA-SUICIDE SEVERITY RATING SCALE - C-SSRS
2. HAVE YOU ACTUALLY HAD ANY THOUGHTS OF KILLING YOURSELF?: NO
1. IN THE PAST MONTH, HAVE YOU WISHED YOU WERE DEAD OR WISHED YOU COULD GO TO SLEEP AND NOT WAKE UP?: NO
6. HAVE YOU EVER DONE ANYTHING, STARTED TO DO ANYTHING, OR PREPARED TO DO ANYTHING TO END YOUR LIFE?: NO

## 2025-02-11 NOTE — CONSULTS
ORTHOPAEDIC CONSULTATION     History Of Present Illness  Adilene Polo is a 79 y.o. female (nonhodgkins lymphoma, cardiac amyloidosis, s/p whipple) p/a mGLF w R hip pain sustaining R LC1 pelvis fx. Denies tingling/numbness in extremities. Ambulates without assistance at baseline. Denies any other injury, denies LOC.    Past medical history: per HPI; no history of blood clots  Past surgical history: per HPI, rest reviewed in EMR  Medications: per EMR  Social History: denies smoking, denies drinking, denies IVDU  Family History:  Non-contributory to this patient's acute surgical issue.    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    Surgical History  She has a past surgical history that includes Cholecystectomy (10/28/2014); Gastric bypass (10/28/2014); Incisional breast biopsy (07/20/2018); Thyroidectomy, partial; and Whipple procedure w/ laparoscopy.     Social History  She reports that she quit smoking about 45 years ago. Her smoking use included cigarettes. She has never used smokeless tobacco. She reports that she does not drink alcohol and does not use drugs.    Family History  Family History   Problem Relation Name Age of Onset    Heart disease Mother          Coronary    Heart disease Father          Coronary    Heart attack Father      Prostate cancer Father    "   Breast cancer Sister      Kidney disease Brother      Lung cancer Brother      Prostate cancer Brother      Pulmonary embolism Brother          Allergies  Cephalosporins, Penicillins, Ace inhibitors, Atorvastatin, and Iodinated contrast media    Review of Systems  12 point ROS negative unless stated in HPI     Physical Exam  GEN - No acute distress, resting comfortably in hospital bed  HEENT - atraumatic, anicteric sclera  CV - RRR by peripheral palpation, limbs wwp  PULM - unlabored breathing on RA  NEURO - Moving all extremities spontaneously  PSYCH - Appropriate mood and affect    RLE:   -skin intact, Tender at site of injury with painful ROM.  -Motor intact in DF/PF/EHL/FHL  -SILT in saph/sural/SPN/DPN distributions  -Foot wwp, 2+ DP/PT pulse, brisk cap refill  -Compartments soft and compressible, no pain with passive dorsiflexion    LLE:   -skin intact  -Motor intact in DF/PF/EHL/FHL  -SILT in saph/sural/SPN/DPN distributions  -Foot wwp, 2+ DP/PT pulse, brisk cap refill  -Compartments soft and compressible, no pain with passive dorsiflexion     Secondary exam negative unless stated above.     Last Recorded Vitals  Blood pressure 166/71, pulse 82, temperature 36.5 °C (97.7 °F), temperature source Oral, resp. rate 16, height 1.753 m (5' 9\"), weight 54.4 kg (120 lb), SpO2 97%.    Relevant Results  Results for orders placed or performed during the hospital encounter of 02/11/25 (from the past 24 hours)   CBC and Auto Differential   Result Value Ref Range    WBC 5.7 4.4 - 11.3 x10*3/uL    nRBC 0.0 0.0 - 0.0 /100 WBCs    RBC 3.19 (L) 4.00 - 5.20 x10*6/uL    Hemoglobin 10.6 (L) 12.0 - 16.0 g/dL    Hematocrit 31.3 (L) 36.0 - 46.0 %    MCV 98 80 - 100 fL    MCH 33.2 26.0 - 34.0 pg    MCHC 33.9 32.0 - 36.0 g/dL    RDW 14.0 11.5 - 14.5 %    Platelets 217 150 - 450 x10*3/uL    Neutrophils % 79.2 40.0 - 80.0 %    Immature Granulocytes %, Automated 0.7 0.0 - 0.9 %    Lymphocytes % 11.5 13.0 - 44.0 %    Monocytes % 7.9 2.0 " - 10.0 %    Eosinophils % 0.2 0.0 - 6.0 %    Basophils % 0.5 0.0 - 2.0 %    Neutrophils Absolute 4.53 1.60 - 5.50 x10*3/uL    Immature Granulocytes Absolute, Automated 0.04 0.00 - 0.50 x10*3/uL    Lymphocytes Absolute 0.66 (L) 0.80 - 3.00 x10*3/uL    Monocytes Absolute 0.45 0.05 - 0.80 x10*3/uL    Eosinophils Absolute 0.01 0.00 - 0.40 x10*3/uL    Basophils Absolute 0.03 0.00 - 0.10 x10*3/uL   Comprehensive metabolic panel   Result Value Ref Range    Glucose 110 (H) 74 - 99 mg/dL    Sodium 138 136 - 145 mmol/L    Potassium 4.3 3.5 - 5.3 mmol/L    Chloride 101 98 - 107 mmol/L    Bicarbonate 27 21 - 32 mmol/L    Anion Gap 14 10 - 20 mmol/L    Urea Nitrogen 35 (H) 6 - 23 mg/dL    Creatinine 0.78 0.50 - 1.05 mg/dL    eGFR 77 >60 mL/min/1.73m*2    Calcium 9.5 8.6 - 10.6 mg/dL    Albumin 4.2 3.4 - 5.0 g/dL    Alkaline Phosphatase 135 33 - 136 U/L    Total Protein 6.5 6.4 - 8.2 g/dL    AST 28 9 - 39 U/L    Bilirubin, Total 1.1 0.0 - 1.2 mg/dL    ALT 22 7 - 45 U/L   Troponin I, High Sensitivity, Initial   Result Value Ref Range    Troponin I, High Sensitivity (CMC) 25 0 - 34 ng/L   Troponin, High Sensitivity, 1 Hour   Result Value Ref Range    Troponin I, High Sensitivity (CMC) 23 0 - 34 ng/L     *Note: Due to a large number of results and/or encounters for the requested time period, some results have not been displayed. A complete set of results can be found in Results Review.       Imaging:  XR/CT w R parasymphyseal fx, R sacral ala fx.     Assessment/Plan     Adilene Polo is a 79 y.o. female presenting with R LC1 fx.    Plan:  - No acute orthopaedic surgical intervention  -Recommend PT/OT for trial of ambulation  - WB Status: WBAT w assistive devices  - ABX not indicated  - Patient to follow up in clinic with Dr. Pablo in 1-2 weeks.  Please call [phone] to schedule appointment after discharge.    Consult seen and staffed within 30 minutes of notification.    Consult discussed with attending, Dr. Pablo.  Recommendations not finalized until note signed by attending.    Zahida Ho MD, PGY-1  Orthopaedic Surgery   Available via Epic Chat    While admitted, this patient will be followed by the Ortho Trauma Team, available via Epic Chat weekdays 6a-6p. Please page [phone] on nights and weekends.    Ortho Trauma  First Call: Zachery Bermudez, PGY-1  Second Call: Karli Owens, PGY-2  Third Call: Ken Infante, PGY-3

## 2025-02-11 NOTE — ED TRIAGE NOTES
Pt brought to ED from home by [facility] after she slipped and fell in her kitchen, no LOC or thinners. Pt is c/o right hip pain. Per squad, pt was able to get up off the floor with help from family prior to their arrival on scene. Pt is alert and oriented upon arrival to ED.

## 2025-02-11 NOTE — ED PROVIDER NOTES
Emergency Department Provider Note          History of Present Illness     CC: Fall and Hip Pain     History provided by: Patient  Limitations to History: None    HPI:   Adilene Polo is a 79 y.o.female with PMH Non-hodgkins lymphoma (on rituximab), cardiac amyloidosis, HTN, hypothyroid, PMN (s/p whipple), presenting to the Emergency Department for fall.  Patient reports he was walking in her kitchen earlier today when she slipped, fell, and landed on her right hip.  Did not hit her head, did not lose consciousness.  Is not taking any blood thinners.  Immediately afterwards had right hip and right thigh pain.  Also has notable swelling to her right clavicle.  Accompanied today by her daughter.  Reports has been compliant with her medications and last took them this morning.  Denies fevers, chills, chest pain, shortness of breath, abdominal pain, or changes in urination/stool for us today.  Denies presyncopal symptoms, lightheadedness, dizziness, palpitations, or chest pains in the previous weeks.    Records Reviewed: Recent available ED and inpatient notes reviewed in EMR.    PMHx/PSHx:  Per HPI.   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  - has a past surgical history that includes Cholecystectomy (10/28/2014); Gastric bypass (10/28/2014); Incisional breast biopsy (07/20/2018); Thyroidectomy, partial; and Whipple procedure w/ laparoscopy.  - has Anemia; Arthritis; Depression, major, single episode, severe (Multi); Diabetes mellitus (Multi); Elevated LFTs; Follicular lymphoma; Grief;  Hyperlipidemia; Hypertension; Hypertensive heart disease; IPMN (intraductal papillary mucinous neoplasm); Mildly underweight adult; Paroxysmal supraventricular tachycardia (CMS-HCC); Hypothyroidism; Vitamin B12 deficiency; Vitamin D deficiency; Protein-calorie malnutrition, unspecified severity (Multi); Age related osteoporosis; Asthma without status asthmaticus ([facility]-HCC); Bursitis of right shoulder; Chronic airway obstruction (Multi); Lumbar spondylosis with myelopathy; Osteoarthritis of multiple joints; Diabetic renal disease (Multi); Seronegative rheumatoid arthritis of multiple sites (Multi); Trochanteric bursitis of left hip; Trochanteric bursitis of right hip; Rheumatoid arthritis; Pancreatic cyst ([facility]-HCC); Cardiac amyloidosis; Familial transthyretin amyloidosis (Multi); Amyloid neuropathy (Multi); Neuropathic pain; Chronic venous insufficiency; and Autoimmune hemolytic anemia (Multi) on their problem list.    Medications:  Current Outpatient Medications   Medication Instructions    b complex 0.4 mg tablet 1 tablet, oral, Daily    blood sugar diagnostic (Accu-Chek SmartView Test Strip) strip USE 1 STRIP TWICE DAILY.    blood-glucose meter misc Check sugars daily    CALCIUM ORAL 1 tablet, oral, Daily    cyanocobalamin (VITAMIN B-12) 1,000 mcg, intramuscular, Every 30 days    diphenhydrAMINE (BENADryl) 50 mg capsule Take one capsule (50 mg) by mouth one hour before contrast medium administration    docusate sodium (STOOL SOFTENER ORAL) 1 capsule, oral, Daily    gabapentin (NEURONTIN) 600 mg, oral, Nightly    levothyroxine (SYNTHROID, LEVOXYL) 100 mcg, oral, Daily before breakfast    magnesium oxide (Mag-Ox) 250 mg magnesium tablet 1 tablet, oral, 2 times daily    metoprolol succinate XL (TOPROL-XL) 25 mg, oral, Daily    NON FORMULARY Vitamin D 50 MCG (2000 UT) Oral Tablet; Take 1 tablet daily    prochlorperazine (COMPAZINE) 10 mg, oral, Every 6 hours PRN    Prolia 60 mg, subcutaneous, Every 6 months     sulfaSALAzine (Azulfidine) 500 mg tablet 2 tablets, oral, 2 times daily, 1,000 mg in the morning and 1500mg at night    tafamidis (VYNDAMAX) 61 mg, oral, Daily    vitamins A and D capsule 3,000 Units, oral, Every other day    vutrisiran sodium (AMVUTTRA SUBQ) subcutaneous    WHEAT DEXTRIN ORAL Benefiber Oral Powder; Use as directed    zinc gluconate 50 mg tablet 1 tablet, oral, Daily        Allergies:  Cephalosporins, Penicillins, Ace inhibitors, Atorvastatin, and Iodinated contrast media    Social History:  - Tobacco:  reports that she quit smoking about 45 years ago. Her smoking use included cigarettes. She has never used smokeless tobacco.   - Alcohol:  reports no history of alcohol use.   - Illicit Drugs:  reports no history of drug use.     ROS:  Per HPI.       Physical Exam     Triage Vitals:  T 36.5 °C (97.7 °F)  HR 82  /71  RR 16  O2 97 % None (Room air)    General: Awake, alert, in no acute distress  Eyes: Gaze conjugate.  No scleral icterus or injection  HENT: Normo-cephalic, atraumatic. No stridor  CV: Regular rate, regular rhythm. Radial pulses 2+ bilaterally  Resp: Breathing non-labored, speaking in full sentences.  Clear to auscultation bilaterally  GI: Soft, non-distended, non-tender. No rebound or guarding.  MSK/Extremities: Tenderness to palpation of the right hip and pubic rami. 5/5 strength to the bilateral LE, sensation intact.  Skin: Warm. Appropriate color  Neuro: Alert. Oriented. Face symmetric. Speech is fluent.  Gross strength and sensation intact in b/l UE and LEs  Psych: Appropriate mood and affect          Eliot Coma Scale Score: 15                    Medical Decision Making & ED Course     EKG: EKG interpreted by myself. Please see ED Course for full interpretation.    Medical Decision Making   Adilene Polo is a 79 y.o.female presenting to the Emergency Department for fall.  On arrival, blood pressure 166/71, rest of vital signs within normal limits.  Afebrile for us.  On  examination, patient appears otherwise clinically well.  Clear lung sounds bilaterally.  Does have mild ecchymosis to the medial aspect of her right clavicle.  No tenderness with palpation.  Full range of motion of the shoulders. will get x-rays for definitive fracture rule out today.  Will also get x-rays of the right lower extremity and right hip for definitive fracture/dislocation rule out.  Symptomatically treated with Tylenol.  Will get basic labs as well as a CT of the head and cervical spine for acute traumatic injury rule out.     Update: CT of the head showed no acute intracranial process.  CT of the cervical spine negative for acute fracture or dislocation.  X-ray of the right femur negative for acute fracture or dislocation.  X-ray of the right pelvis showed questionable buckle fracture of the right pubic rami.  Does have mild tenderness palpation over this area.  Will get CTs for definitive fracture rule out today.  X-ray of the clavicle showed no acute fracture or dislocation.  On reassessment after Tylenol, patient feels symptomatically improved.  Signed out to oncoming provider with CT of the pelvis and final disposition pending.      ED Course as of 02/11/25 1451   Tue Feb 11, 2025   1416 EKG: Regular rate, regular rhythm.  TN interval borderline at 192.  QRS, QTc intervals within normal limits.  No ST elevations, depressions, or T wave inversions.  Normal sinus rhythm. [AS]   1447 CT head wo IV contrast [AS]      ED Course User Index  [AS] Barrett Valdovinos MD       Independent Result Review and Interpretation: Relevant laboratory and radiographic results were reviewed and independently interpreted by myself.  As necessary, they are commented on in the ED Course.    Chronic conditions affecting the patient's care: As documented above in MDM.        Disposition   Sign Out    Barrett Valdovinos MD  Emergency Medicine PGY3      Procedures     Procedures ? SmartLinks last updated 2/11/2025 2:51 PM         Barrett Valdovinos MD  Resident  02/11/25 6801

## 2025-02-12 ENCOUNTER — TELEPHONE (OUTPATIENT)
Dept: RADIOLOGY | Facility: HOSPITAL | Age: 80
End: 2025-02-12
Payer: MEDICARE

## 2025-02-12 NOTE — DISCHARGE INSTRUCTIONS
Follow-up with orthopedic surgery in one to two weeks. Unable to ambulate. Please use Walker as needed for ambulation. Return it for new or worsening symptoms    Patient to follow up in clinic with Dr. Pablo in 1-2 weeks.  Please call [phone] to schedule appointment after discharge.

## 2025-02-12 NOTE — PROGRESS NOTES
Emergency Department Transition of Care Note       Signout   I received Adilene Polo in signout from Dr. Valdovinos.  Please see the ED Provider Note for all HPI, PE and MDM up to the time of signout at 3 pm. This is in addition to the primary record.    In brief Adileen Polo is an 79 y.o. female presenting for fall with hip pain. Xray was concerning for a pelvic fracture.       At the time of signout we were awaiting:  CT    ED Course & Medical Decision Making   Medical Decision Making:  Under my care, patient's CT scan resulted which showed nondisplaced right superior and inferior pubic ramus fractures and nondisplaced vertical fracture of the right sacral ala. Ortho was consulted who recommended ambulation trial. Patient was ambulated with a walker. Given ortho follow-up. Patient was discharged home in stable condition. Patient was advised to return if symptoms worsen or don’t improve. Patient was advised to follow up with their PCP as needed.       ED Course:  ED Course as of 02/13/25 0844   Tue Feb 11, 2025   1416 EKG: Regular rate, regular rhythm.  MA interval borderline at 192.  QRS, QTc intervals within normal limits.  No ST elevations, depressions, or T wave inversions.  Normal sinus rhythm. [AS]   1447 CT head wo IV contrast [AS]   1625 CT pelvis showed multiple pelvic fractures. Will discuss with orthopedic surgery. [BREN]   1709 Ortho recommended and ambulation trial. [BREN]      ED Course User Index  [AS] Barrett Valdovinos MD  [BREN] Skylar Higgins MD         Diagnoses as of 02/13/25 0844   Lung nodule   Multiple closed fractures of pelvis without disruption of pelvic ring, initial encounter (Multi)       Disposition   As a result of the work-up, the patient was discharged home.  she was informed of her diagnosis and instructed to come back with any concerns or worsening of condition.  she and was agreeable to the plan as discussed above.  she was given the opportunity to ask questions.  All of the patient's  questions were answered.    Procedures   Procedures    Patient seen and discussed with the ED physician.     Skylar Higgins MD  Emergency Medicine

## 2025-02-12 NOTE — TELEPHONE ENCOUNTER
2/12/25 3486  Per Samra Allred, she spoke with patient and patient is declining follow up as this is a long standing finding. PARIS Cage    2/12/25 9149  Patient found on CXR to have 9 mm nodule in ED and is referred to lung nodule clinic. I have messaged them to determine if they are willing to see patient based on: 1. Size of nodule and 2. That it is from a cxr instead of a dedicated CT chest. I will await their response and proceed from there. PARIS Cage

## 2025-02-13 ENCOUNTER — TELEMEDICINE (OUTPATIENT)
Dept: PRIMARY CARE | Facility: CLINIC | Age: 80
End: 2025-02-13
Payer: MEDICARE

## 2025-02-13 DIAGNOSIS — R91.1 LUNG NODULE: Primary | ICD-10-CM

## 2025-02-13 PROCEDURE — 1157F ADVNC CARE PLAN IN RCRD: CPT | Performed by: NURSE PRACTITIONER

## 2025-02-13 PROCEDURE — 1123F ACP DISCUSS/DSCN MKR DOCD: CPT | Performed by: NURSE PRACTITIONER

## 2025-02-13 PROCEDURE — 1159F MED LIST DOCD IN RCRD: CPT | Performed by: NURSE PRACTITIONER

## 2025-02-13 PROCEDURE — 99202 OFFICE O/P NEW SF 15 MIN: CPT | Performed by: NURSE PRACTITIONER

## 2025-02-13 ASSESSMENT — LIFESTYLE VARIABLES
HOW OFTEN DO YOU HAVE A DRINK CONTAINING ALCOHOL: NEVER
HOW MANY STANDARD DRINKS CONTAINING ALCOHOL DO YOU HAVE ON A TYPICAL DAY: PATIENT DOES NOT DRINK
HOW OFTEN DO YOU HAVE SIX OR MORE DRINKS ON ONE OCCASION: NEVER
AUDIT-C TOTAL SCORE: 0
SKIP TO QUESTIONS 9-10: 1

## 2025-02-13 ASSESSMENT — ANXIETY QUESTIONNAIRES
4. TROUBLE RELAXING: NOT AT ALL
2. NOT BEING ABLE TO STOP OR CONTROL WORRYING: NOT AT ALL
7. FEELING AFRAID AS IF SOMETHING AWFUL MIGHT HAPPEN: NOT AT ALL
GAD7 TOTAL SCORE: 0
IF YOU CHECKED OFF ANY PROBLEMS ON THIS QUESTIONNAIRE, HOW DIFFICULT HAVE THESE PROBLEMS MADE IT FOR YOU TO DO YOUR WORK, TAKE CARE OF THINGS AT HOME, OR GET ALONG WITH OTHER PEOPLE: NOT DIFFICULT AT ALL
1. FEELING NERVOUS, ANXIOUS, OR ON EDGE: NOT AT ALL
5. BEING SO RESTLESS THAT IT IS HARD TO SIT STILL: NOT AT ALL
6. BECOMING EASILY ANNOYED OR IRRITABLE: NOT AT ALL
3. WORRYING TOO MUCH ABOUT DIFFERENT THINGS: NOT AT ALL

## 2025-02-13 ASSESSMENT — PATIENT HEALTH QUESTIONNAIRE - PHQ9
2. FEELING DOWN, DEPRESSED OR HOPELESS: NOT AT ALL
SUM OF ALL RESPONSES TO PHQ9 QUESTIONS 1 & 2: 0
1. LITTLE INTEREST OR PLEASURE IN DOING THINGS: NOT AT ALL

## 2025-02-13 ASSESSMENT — ENCOUNTER SYMPTOMS
OCCASIONAL FEELINGS OF UNSTEADINESS: 0
DEPRESSION: 0
LOSS OF SENSATION IN FEET: 1

## 2025-02-13 ASSESSMENT — COLUMBIA-SUICIDE SEVERITY RATING SCALE - C-SSRS
2. HAVE YOU ACTUALLY HAD ANY THOUGHTS OF KILLING YOURSELF?: NO
6. HAVE YOU EVER DONE ANYTHING, STARTED TO DO ANYTHING, OR PREPARED TO DO ANYTHING TO END YOUR LIFE?: NO
1. IN THE PAST MONTH, HAVE YOU WISHED YOU WERE DEAD OR WISHED YOU COULD GO TO SLEEP AND NOT WAKE UP?: NO

## 2025-02-13 NOTE — PROGRESS NOTES
Subjective   Patient ID: Adilene Polo is a 79 y.o. female who presents for No chief complaint on file..  HPI 79-year-old female presents today for lung nodule clinic.    Telephone visit between Adilene Polo and Sanam Johnston CNP at [facility] lung nodule clinic per patient request.    Smoking history stopped 45 years ago   Smoked less than a pack a day for about 10 years   Non Hodgkin Lymphoma and melanoma  Pgf and 2 brothers with lung cancer.     Chest x-ray dated 2/11/2025  9  mm nodular opacity projecting in the right lower lung without correlate on the previous CT chest from 08/07/2024.   Chronic calcified granuloma in the left mid lung.    CT chest abdomen and pelvis 7/24/2024  There are multiple scattered punctate 1-2 mm nodules throughout the  lungs. No pulmonary masses or consolidation. A densely calcified  granuloma is noted in the left lower lobe.     Review of Systems  Review of systems: Present-feeling well. Not present-chills, fatigue and fever.  Respiratory: Not present-difficulty breathing, cough, bloody sputum.  Cardiovascular: Not present-chest pain, palpitations, dyspnea on exertion.  Objective   There were no vitals taken for this visit.   Assessment/Plan   Diagnoses and all orders for this visit:  Lung nodule  -     CT Chest

## 2025-02-15 NOTE — PATIENT INSTRUCTIONS
New 9 mm nodule noted on chest x-ray.  Recommend dedicated CT of the chest.  She will be notified of results as they become available.

## 2025-02-17 ENCOUNTER — TELEPHONE (OUTPATIENT)
Dept: PRIMARY CARE | Facility: CLINIC | Age: 80
End: 2025-02-17
Payer: MEDICARE

## 2025-02-17 DIAGNOSIS — R91.1 LUNG NODULE: Primary | ICD-10-CM

## 2025-02-17 NOTE — TELEPHONE ENCOUNTER
Open CT scan, spoke with Adilene and I will call in about a week to schedule per request. She does state the location on green is best.

## 2025-02-18 ENCOUNTER — TELEPHONE (OUTPATIENT)
Dept: PRIMARY CARE | Facility: CLINIC | Age: 80
End: 2025-02-18

## 2025-02-18 ENCOUNTER — TELEPHONE (OUTPATIENT)
Dept: HEMATOLOGY/ONCOLOGY | Facility: CLINIC | Age: 80
End: 2025-02-18
Payer: MEDICARE

## 2025-02-18 DIAGNOSIS — M79.661 PAIN IN BOTH LOWER LEGS: Primary | ICD-10-CM

## 2025-02-18 DIAGNOSIS — M79.662 PAIN IN BOTH LOWER LEGS: Primary | ICD-10-CM

## 2025-02-18 RX ORDER — TRAMADOL HYDROCHLORIDE 50 MG/1
50 TABLET ORAL EVERY 12 HOURS PRN
Qty: 20 TABLET | Refills: 0 | Status: SHIPPED | OUTPATIENT
Start: 2025-02-18 | End: 2025-02-28

## 2025-02-18 NOTE — TELEPHONE ENCOUNTER
Spoke with the patient. Provided update from Dr. Pandey. Pt verbalized understanding and will follow-up with her PCP or orthopedic doctor for her leg pain as well. Pt verbalized understanding and had no further questions or concerns at this time

## 2025-02-18 NOTE — TELEPHONE ENCOUNTER
"Spoke with the patient. States she fell on Feb 11 and went to the ED then. Found \"several bruises and a few fractures.\" ED note mentions multiple closed fractures of the pelvis. States the pain in her right leg is the worst. Pt is currently only taking tylenol and would like something prescribed that is stronger. States she hasn't called her PCP because she has only seen her once before. Did recommend the patient call her PCP as well.     Right leg- most painful. Using a walker to walk now. If patient puts any weight on right leg pain is 8/10. Explained to patient that I would update Dr. Pandey and then call her back once I receive a response.   "

## 2025-02-25 ENCOUNTER — HOSPITAL ENCOUNTER (OUTPATIENT)
Dept: RADIOLOGY | Facility: CLINIC | Age: 80
Discharge: HOME | End: 2025-02-25
Payer: MEDICARE

## 2025-02-25 DIAGNOSIS — R91.1 LUNG NODULE: ICD-10-CM

## 2025-02-25 PROCEDURE — 71250 CT THORAX DX C-: CPT

## 2025-02-27 ENCOUNTER — APPOINTMENT (OUTPATIENT)
Dept: CARDIOLOGY | Facility: HOSPITAL | Age: 80
End: 2025-02-27
Payer: MEDICARE

## 2025-02-28 ENCOUNTER — APPOINTMENT (OUTPATIENT)
Dept: ORTHOPEDIC SURGERY | Facility: HOSPITAL | Age: 80
End: 2025-02-28
Payer: MEDICARE

## 2025-03-01 ENCOUNTER — TELEPHONE (OUTPATIENT)
Dept: PRIMARY CARE | Facility: CLINIC | Age: 80
End: 2025-03-01
Payer: MEDICARE

## 2025-03-01 DIAGNOSIS — R91.8 LUNG NODULES: Primary | ICD-10-CM

## 2025-03-05 ENCOUNTER — TELEPHONE (OUTPATIENT)
Dept: HEMATOLOGY/ONCOLOGY | Facility: CLINIC | Age: 80
End: 2025-03-05
Payer: MEDICARE

## 2025-03-05 NOTE — TELEPHONE ENCOUNTER
Spoke with the patient. Pt aware that a treatment appointment on 4.1.25 is pending. A  should be calling her to schedule. The patient verbalized understanding and will wait to see the treatment appt come across her ENDOTRONIX nayeli. Pt had no further questions or concerns at this time.

## 2025-03-26 ENCOUNTER — TELEPHONE (OUTPATIENT)
Dept: HEMATOLOGY/ONCOLOGY | Facility: CLINIC | Age: 80
End: 2025-03-26
Payer: MEDICARE

## 2025-03-26 DIAGNOSIS — D59.10 AUTOIMMUNE HEMOLYTIC ANEMIA: Primary | ICD-10-CM

## 2025-03-26 DIAGNOSIS — C82.93 FOLLICULAR LYMPHOMA OF INTRA-ABDOMINAL LYMPH NODES, UNSPECIFIED FOLLICULAR LYMPHOMA TYPE: ICD-10-CM

## 2025-03-26 NOTE — TELEPHONE ENCOUNTER
Patient went for labs today.  They could not see orders.  Need to be updated to [facility]- she plans to go again tomorrow.  Patient uses [facility].

## 2025-03-27 ENCOUNTER — LAB (OUTPATIENT)
Dept: LAB | Facility: HOSPITAL | Age: 80
End: 2025-03-27
Payer: MEDICARE

## 2025-03-27 DIAGNOSIS — D59.10 AUTOIMMUNE HEMOLYTIC ANEMIA, UNSPECIFIED: Primary | ICD-10-CM

## 2025-03-27 LAB
ALBUMIN SERPL BCP-MCNC: 4 G/DL (ref 3.4–5)
ALP SERPL-CCNC: 334 U/L (ref 33–136)
ALT SERPL W P-5'-P-CCNC: 21 U/L (ref 7–45)
ANION GAP SERPL CALC-SCNC: 13 MMOL/L (ref 10–20)
AST SERPL W P-5'-P-CCNC: 35 U/L (ref 9–39)
BASOPHILS # BLD AUTO: 0.03 X10*3/UL (ref 0–0.1)
BASOPHILS NFR BLD AUTO: 1 %
BILIRUB SERPL-MCNC: 0.7 MG/DL (ref 0–1.2)
BUN SERPL-MCNC: 25 MG/DL (ref 6–23)
CALCIUM SERPL-MCNC: 8.9 MG/DL (ref 8.6–10.6)
CHLORIDE SERPL-SCNC: 107 MMOL/L (ref 98–107)
CO2 SERPL-SCNC: 25 MMOL/L (ref 21–32)
CREAT SERPL-MCNC: 0.8 MG/DL (ref 0.5–1.05)
EGFRCR SERPLBLD CKD-EPI 2021: 75 ML/MIN/1.73M*2
EOSINOPHIL # BLD AUTO: 0.02 X10*3/UL (ref 0–0.4)
EOSINOPHIL NFR BLD AUTO: 0.7 %
ERYTHROCYTE [DISTWIDTH] IN BLOOD BY AUTOMATED COUNT: 13.5 % (ref 11.5–14.5)
GLUCOSE SERPL-MCNC: 103 MG/DL (ref 74–99)
HAPTOGLOB SERPL NEPH-MCNC: 43 MG/DL (ref 30–200)
HCT VFR BLD AUTO: 35 % (ref 36–46)
HGB BLD-MCNC: 10.9 G/DL (ref 12–16)
HOLD SPECIMEN: NORMAL
IMM GRANULOCYTES # BLD AUTO: 0.06 X10*3/UL (ref 0–0.5)
IMM GRANULOCYTES NFR BLD AUTO: 2 % (ref 0–0.9)
LDH SERPL L TO P-CCNC: 262 U/L (ref 84–246)
LYMPHOCYTES # BLD AUTO: 0.87 X10*3/UL (ref 0.8–3)
LYMPHOCYTES NFR BLD AUTO: 29.2 %
MCH RBC QN AUTO: 32.3 PG (ref 26–34)
MCHC RBC AUTO-ENTMCNC: 31.1 G/DL (ref 32–36)
MCV RBC AUTO: 104 FL (ref 80–100)
MONOCYTES # BLD AUTO: 0.26 X10*3/UL (ref 0.05–0.8)
MONOCYTES NFR BLD AUTO: 8.7 %
NEUTROPHILS # BLD AUTO: 1.74 X10*3/UL (ref 1.6–5.5)
NEUTROPHILS NFR BLD AUTO: 58.4 %
NRBC BLD-RTO: 0 /100 WBCS (ref 0–0)
PLATELET # BLD AUTO: 265 X10*3/UL (ref 150–450)
POTASSIUM SERPL-SCNC: 4.3 MMOL/L (ref 3.5–5.3)
PROT SERPL-MCNC: 6.7 G/DL (ref 6.4–8.2)
RBC # BLD AUTO: 3.37 X10*6/UL (ref 4–5.2)
SODIUM SERPL-SCNC: 141 MMOL/L (ref 136–145)
URATE SERPL-MCNC: 3.4 MG/DL (ref 2.3–6.7)
WBC # BLD AUTO: 3 X10*3/UL (ref 4.4–11.3)

## 2025-03-27 PROCEDURE — 83010 ASSAY OF HAPTOGLOBIN QUANT: CPT

## 2025-03-27 PROCEDURE — 80053 COMPREHEN METABOLIC PANEL: CPT

## 2025-03-27 PROCEDURE — 85025 COMPLETE CBC W/AUTO DIFF WBC: CPT

## 2025-03-27 PROCEDURE — 84550 ASSAY OF BLOOD/URIC ACID: CPT

## 2025-03-27 PROCEDURE — 83615 LACTATE (LD) (LDH) ENZYME: CPT

## 2025-03-31 ENCOUNTER — OFFICE VISIT (OUTPATIENT)
Dept: CARDIOLOGY | Facility: HOSPITAL | Age: 80
End: 2025-03-31
Payer: MEDICARE

## 2025-03-31 VITALS
HEART RATE: 75 BPM | RESPIRATION RATE: 16 BRPM | BODY MASS INDEX: 17.65 KG/M2 | SYSTOLIC BLOOD PRESSURE: 132 MMHG | OXYGEN SATURATION: 99 % | TEMPERATURE: 97.2 F | DIASTOLIC BLOOD PRESSURE: 61 MMHG | WEIGHT: 119.49 LBS

## 2025-03-31 DIAGNOSIS — I43 CARDIAC AMYLOIDOSIS: Primary | ICD-10-CM

## 2025-03-31 DIAGNOSIS — I47.10 PAROXYSMAL SUPRAVENTRICULAR TACHYCARDIA (CMS-HCC): ICD-10-CM

## 2025-03-31 DIAGNOSIS — E06.3 HYPOTHYROIDISM DUE TO HASHIMOTO'S THYROIDITIS: Primary | ICD-10-CM

## 2025-03-31 DIAGNOSIS — I50.30 STAGE C DIASTOLIC HEART FAILURE: ICD-10-CM

## 2025-03-31 DIAGNOSIS — E85.4 CARDIAC AMYLOIDOSIS: Primary | ICD-10-CM

## 2025-03-31 PROCEDURE — 1036F TOBACCO NON-USER: CPT | Performed by: INTERNAL MEDICINE

## 2025-03-31 PROCEDURE — 1160F RVW MEDS BY RX/DR IN RCRD: CPT | Performed by: INTERNAL MEDICINE

## 2025-03-31 PROCEDURE — 1123F ACP DISCUSS/DSCN MKR DOCD: CPT | Performed by: INTERNAL MEDICINE

## 2025-03-31 PROCEDURE — 3078F DIAST BP <80 MM HG: CPT | Performed by: INTERNAL MEDICINE

## 2025-03-31 PROCEDURE — 3075F SYST BP GE 130 - 139MM HG: CPT | Performed by: INTERNAL MEDICINE

## 2025-03-31 PROCEDURE — 1126F AMNT PAIN NOTED NONE PRSNT: CPT | Performed by: INTERNAL MEDICINE

## 2025-03-31 PROCEDURE — 1157F ADVNC CARE PLAN IN RCRD: CPT | Performed by: INTERNAL MEDICINE

## 2025-03-31 PROCEDURE — 99213 OFFICE O/P EST LOW 20 MIN: CPT | Performed by: INTERNAL MEDICINE

## 2025-03-31 PROCEDURE — 1159F MED LIST DOCD IN RCRD: CPT | Performed by: INTERNAL MEDICINE

## 2025-03-31 RX ORDER — LEVOTHYROXINE SODIUM 100 UG/1
100 TABLET ORAL
Qty: 90 TABLET | Refills: 2 | Status: SHIPPED | OUTPATIENT
Start: 2025-03-31

## 2025-03-31 ASSESSMENT — PAIN SCALES - GENERAL: PAINLEVEL_OUTOF10: 0-NO PAIN

## 2025-03-31 NOTE — PROGRESS NOTES
[facility] Advanced [facility] Failure [facility]  Primary Care Physician: Ebony Carmona MD  Referring Provider/Cardiologist: Irma (/[facility])     Date of Visit: 2025 11:20 AM EDT  Location of visit: [facility]     HPI:   Ms. Polo is a 79F with a PMHx sig for RA, SVT, DM, h/o gastric bypass, h/o intraductal papillary mucinous neoplasm s/p whipple, NHL currently being treated with Rituxan, hypothyroidism, and stage C diastolic HFpEF/restrictive CM secondary to cardiac hATTR amyloidosis with associated peripheral neuropathy who returns to the Advanced Heart Failure clinic for ongoing evaluation and management.      Interval hx:   She reports intermittent left sided chest discomfort and palpitations, most notably at night when things are quiet. Symptoms last a minute or so and go away on its own. Otherwise, her main issue is progression of her peripheral neuropathy.       Hospitalizations: Denies      SocHx:  Lives with  in [place]    FamHx:  Mother with HF  Father with CAD and prostate CA  Brother  of ESRD       Current Outpatient Medications   Medication Sig Dispense Refill    b complex 0.4 mg tablet Take 1 tablet by mouth once daily.      blood sugar diagnostic (Accu-Chek SmartView Test Strip) strip USE 1 STRIP TWICE DAILY.      blood-glucose meter misc Check sugars daily 1 each 0    CALCIUM ORAL Take 1 tablet by mouth 1 (one) time each day.      cyanocobalamin (Vitamin B-12) 1,000 mcg/mL injection Inject 1 mL (1,000 mcg) into the muscle every 30 (thirty) days. 1 mL 11    denosumab (Prolia) 60 mg/mL syringe Inject 1 mL (60 mg) under the skin every 6 months.      docusate sodium (STOOL SOFTENER ORAL) Take 1 capsule by mouth 1 (one) time each day.      gabapentin (Neurontin) 300 mg capsule Take 2 capsules (600 mg) by mouth once daily at bedtime. 180 capsule 3    levothyroxine (Synthroid, Levoxyl) 100 mcg tablet Take 1 tablet (100 mcg) by mouth once daily in the morning.  Take before meals. 90 tablet 1    magnesium oxide (Mag-Ox) 250 mg magnesium tablet Take 1 tablet (250 mg) by mouth 2 times a day.      metoprolol succinate XL (Toprol-XL) 25 mg 24 hr tablet TAKE 1 TABLET BY MOUTH EVERY DAY 90 tablet 3    NON FORMULARY Vitamin D 50 MCG (2000 UT) Oral Tablet; Take 1 tablet daily      prochlorperazine (Compazine) 10 mg tablet Take 1 tablet (10 mg) by mouth every 6 hours if needed for nausea or vomiting. 30 tablet 5    sulfaSALAzine (Azulfidine) 500 mg tablet Take 2 tablets (1,000 mg) by mouth 2 times a day. 1,000 mg in the morning and 1500mg at night      tafamidis (Vyndamax) 61 mg capsule Take 1 capsule (61 mg) by mouth once daily. 90 capsule 3    vitamins A and D capsule Take 3,000 Units by mouth every other day. 15 each 11    vutrisiran sodium (AMVUTTRA SUBQ) Inject under the skin.      WHEAT DEXTRIN ORAL Benefiber Oral Powder; Use as directed      zinc gluconate 50 mg tablet Take 1 tablet (50 mg) by mouth once daily.      diphenhydrAMINE (BENADryl) 50 mg capsule Take one capsule (50 mg) by mouth one hour before contrast medium administration 1 capsule 0     No current facility-administered medications for this visit.       Allergies   Allergen Reactions    Cephalosporins Anaphylaxis    Penicillins Anaphylaxis    Ace Inhibitors Cough    Atorvastatin Unknown     Elevated LFT    Iodinated Contrast Media Unknown         Visit Vitals  /61   Pulse 75   Temp 36.2 °C (97.2 °F) (Core)   Resp 16   Wt 54.2 kg (119 lb 7.8 oz)   SpO2 99%   BMI 17.65 kg/m²   Smoking Status Former   BSA 1.62 m²        Physical Exam:  On exam Ms. Polo appears her stated age, is alert and oriented x3, and in no acute distress. Her sclera are anicteric and her oropharynx has moist mucous membranes. Her neck is supple and without thyromegaly. The JVP is ~6 cm of water above the right atrium. Her cardiac exam has regular rhythm, normal S1, S2. No S3/4. There are no murmurs. Her lungs are clear to auscultation  bilaterally and there is no dullness to percussion. Her abdomen is soft, nontender with normoactive bowel sounds. There is no HJR. The extremities are warm and without sig edema. The skin is dry. There is no rash present. The distal pulses are 2+ in all four extremities. Her mood and affect are appropriate for todays encounter.       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     ECG (9/28/23):  Sinus rhythm (HR 70), no evidence of low voltage    Echo (9/8/23):  1. Left ventricular systolic function is normal with a 55-60% estimated ejection fraction.  2. The mean GLPS is 14.2%, which is diminished, with relatively normal strain values involving the apex. The myocardium has somewhat of a speckled appearance. These two findings raise the question of possible cardiac amyloidosis.  3. There is moderate to severe concentric left ventricular hypertrophy.  4. The left atrium is moderately dilated.  5. The estimated RVSP is 19 mm.      Impression/Plan:  Ms. Polo is a 79F with a PMHx sig for RA, SVT, DM, h/o gastric bypass, h/o intraductal papillary mucinous neoplasm s/p whipple, NHL currently being treated with Rituxan, hypothyroidism, and stage C diastolic HFpEF/restrictive CM secondary to cardiac hATTR amyloidosis with associated peripheral neuropathy who returns to the Advanced Heart Failure clinic for ongoing evaluation and management. At the current time she has functional class II symptoms and appears euvolemic.      1) Stage C chronic diastolic HF/HFpEF/restrictive CM secondary to hATTR (c.238A>G; [id number]) cardiac amyloidosis  Gene: hATTR (c.238A>G; p.[id number])  Reports progression of her peripheral neuropathy.   -c/w vyndamax 61 mg daily and amvuttra  -f/u with neurology    2) pSVT  Currently on beta blocker; may need to reconsider therapy in the future if BB starts to affect her CO.      F/U: 6 months at /[facility] [place],  Thank you for referring Ms. Polo to the  Advanced Heart Failure Clinic. Please let me know if you have any questions.       ____________________________________________________________  Dejan Fortune DO  Section of Advanced Heart Failure and Cardiac Transplantation  Division of Cardiovascular Medicine  [facility]  [facility]

## 2025-04-01 ENCOUNTER — OFFICE VISIT (OUTPATIENT)
Dept: HEMATOLOGY/ONCOLOGY | Facility: CLINIC | Age: 80
End: 2025-04-01
Payer: MEDICARE

## 2025-04-01 ENCOUNTER — INFUSION (OUTPATIENT)
Dept: HEMATOLOGY/ONCOLOGY | Facility: CLINIC | Age: 80
End: 2025-04-01
Payer: MEDICARE

## 2025-04-01 ENCOUNTER — APPOINTMENT (OUTPATIENT)
Dept: HEMATOLOGY/ONCOLOGY | Facility: CLINIC | Age: 80
End: 2025-04-01
Payer: MEDICARE

## 2025-04-01 VITALS
WEIGHT: 117.28 LBS | OXYGEN SATURATION: 98 % | RESPIRATION RATE: 16 BRPM | TEMPERATURE: 97.7 F | HEART RATE: 70 BPM | DIASTOLIC BLOOD PRESSURE: 68 MMHG | BODY MASS INDEX: 17.32 KG/M2 | SYSTOLIC BLOOD PRESSURE: 150 MMHG

## 2025-04-01 VITALS
SYSTOLIC BLOOD PRESSURE: 129 MMHG | TEMPERATURE: 98.2 F | DIASTOLIC BLOOD PRESSURE: 69 MMHG | HEART RATE: 62 BPM | RESPIRATION RATE: 16 BRPM | OXYGEN SATURATION: 96 %

## 2025-04-01 DIAGNOSIS — E06.3 HYPOTHYROIDISM DUE TO HASHIMOTO THYROIDITIS: ICD-10-CM

## 2025-04-01 DIAGNOSIS — D59.10 AUTOIMMUNE HEMOLYTIC ANEMIA: ICD-10-CM

## 2025-04-01 DIAGNOSIS — D37.8 INTRADUCTAL PAPILLARY MUCINOUS TUMOR OF UNCERTAIN BEHAVIOR OF PANCREAS: ICD-10-CM

## 2025-04-01 DIAGNOSIS — C82.93 FOLLICULAR LYMPHOMA OF INTRA-ABDOMINAL LYMPH NODES, UNSPECIFIED FOLLICULAR LYMPHOMA TYPE: ICD-10-CM

## 2025-04-01 DIAGNOSIS — E85.4 CARDIAC AMYLOIDOSIS: ICD-10-CM

## 2025-04-01 DIAGNOSIS — I10 PRIMARY HYPERTENSION: ICD-10-CM

## 2025-04-01 DIAGNOSIS — D59.10 AUTOIMMUNE HEMOLYTIC ANEMIA: Primary | ICD-10-CM

## 2025-04-01 DIAGNOSIS — I43 CARDIAC AMYLOIDOSIS: ICD-10-CM

## 2025-04-01 PROCEDURE — G2211 COMPLEX E/M VISIT ADD ON: HCPCS | Performed by: INTERNAL MEDICINE

## 2025-04-01 PROCEDURE — 2500000001 HC RX 250 WO HCPCS SELF ADMINISTERED DRUGS (ALT 637 FOR MEDICARE OP): Performed by: INTERNAL MEDICINE

## 2025-04-01 PROCEDURE — 1126F AMNT PAIN NOTED NONE PRSNT: CPT | Performed by: INTERNAL MEDICINE

## 2025-04-01 PROCEDURE — 1159F MED LIST DOCD IN RCRD: CPT | Performed by: INTERNAL MEDICINE

## 2025-04-01 PROCEDURE — 2500000004 HC RX 250 GENERAL PHARMACY W/ HCPCS (ALT 636 FOR OP/ED): Performed by: INTERNAL MEDICINE

## 2025-04-01 PROCEDURE — 96375 TX/PRO/DX INJ NEW DRUG ADDON: CPT | Mod: INF

## 2025-04-01 PROCEDURE — 96413 CHEMO IV INFUSION 1 HR: CPT

## 2025-04-01 PROCEDURE — 1160F RVW MEDS BY RX/DR IN RCRD: CPT | Performed by: INTERNAL MEDICINE

## 2025-04-01 PROCEDURE — 1157F ADVNC CARE PLAN IN RCRD: CPT | Performed by: INTERNAL MEDICINE

## 2025-04-01 PROCEDURE — 99214 OFFICE O/P EST MOD 30 MIN: CPT | Performed by: INTERNAL MEDICINE

## 2025-04-01 PROCEDURE — 3077F SYST BP >= 140 MM HG: CPT | Performed by: INTERNAL MEDICINE

## 2025-04-01 PROCEDURE — 3078F DIAST BP <80 MM HG: CPT | Performed by: INTERNAL MEDICINE

## 2025-04-01 PROCEDURE — 1123F ACP DISCUSS/DSCN MKR DOCD: CPT | Performed by: INTERNAL MEDICINE

## 2025-04-01 RX ORDER — PROCHLORPERAZINE EDISYLATE 5 MG/ML
10 INJECTION INTRAMUSCULAR; INTRAVENOUS EVERY 6 HOURS PRN
OUTPATIENT
Start: 2025-05-28

## 2025-04-01 RX ORDER — DIPHENHYDRAMINE HYDROCHLORIDE 50 MG/ML
50 INJECTION, SOLUTION INTRAMUSCULAR; INTRAVENOUS AS NEEDED
OUTPATIENT
Start: 2025-05-28

## 2025-04-01 RX ORDER — PROCHLORPERAZINE EDISYLATE 5 MG/ML
10 INJECTION INTRAMUSCULAR; INTRAVENOUS EVERY 6 HOURS PRN
Status: DISCONTINUED | OUTPATIENT
Start: 2025-04-01 | End: 2025-04-01 | Stop reason: HOSPADM

## 2025-04-01 RX ORDER — ALBUTEROL SULFATE 0.83 MG/ML
3 SOLUTION RESPIRATORY (INHALATION) AS NEEDED
Status: DISCONTINUED | OUTPATIENT
Start: 2025-04-01 | End: 2025-04-01 | Stop reason: HOSPADM

## 2025-04-01 RX ORDER — FAMOTIDINE 10 MG/ML
20 INJECTION, SOLUTION INTRAVENOUS ONCE AS NEEDED
OUTPATIENT
Start: 2025-05-28

## 2025-04-01 RX ORDER — EPINEPHRINE 0.3 MG/.3ML
0.3 INJECTION SUBCUTANEOUS EVERY 5 MIN PRN
OUTPATIENT
Start: 2025-05-28

## 2025-04-01 RX ORDER — HEPARIN SODIUM,PORCINE/PF 10 UNIT/ML
50 SYRINGE (ML) INTRAVENOUS AS NEEDED
OUTPATIENT
Start: 2025-04-01

## 2025-04-01 RX ORDER — DIPHENHYDRAMINE HYDROCHLORIDE 50 MG/ML
50 INJECTION, SOLUTION INTRAMUSCULAR; INTRAVENOUS AS NEEDED
Status: DISCONTINUED | OUTPATIENT
Start: 2025-04-01 | End: 2025-04-01 | Stop reason: HOSPADM

## 2025-04-01 RX ORDER — PROCHLORPERAZINE MALEATE 10 MG
10 TABLET ORAL EVERY 6 HOURS PRN
Status: DISCONTINUED | OUTPATIENT
Start: 2025-04-01 | End: 2025-04-01 | Stop reason: HOSPADM

## 2025-04-01 RX ORDER — EPINEPHRINE 0.3 MG/.3ML
0.3 INJECTION SUBCUTANEOUS EVERY 5 MIN PRN
Status: DISCONTINUED | OUTPATIENT
Start: 2025-04-01 | End: 2025-04-01 | Stop reason: HOSPADM

## 2025-04-01 RX ORDER — PROCHLORPERAZINE MALEATE 10 MG
10 TABLET ORAL EVERY 6 HOURS PRN
OUTPATIENT
Start: 2025-05-28

## 2025-04-01 RX ORDER — ALBUTEROL SULFATE 0.83 MG/ML
3 SOLUTION RESPIRATORY (INHALATION) AS NEEDED
OUTPATIENT
Start: 2025-05-28

## 2025-04-01 RX ORDER — FAMOTIDINE 10 MG/ML
20 INJECTION, SOLUTION INTRAVENOUS ONCE AS NEEDED
Status: DISCONTINUED | OUTPATIENT
Start: 2025-04-01 | End: 2025-04-01 | Stop reason: HOSPADM

## 2025-04-01 RX ORDER — ACETAMINOPHEN 325 MG/1
650 TABLET ORAL ONCE
OUTPATIENT
Start: 2025-05-28

## 2025-04-01 RX ORDER — ACETAMINOPHEN 325 MG/1
650 TABLET ORAL ONCE
Status: COMPLETED | OUTPATIENT
Start: 2025-04-01 | End: 2025-04-01

## 2025-04-01 RX ORDER — FAMOTIDINE 10 MG/ML
20 INJECTION, SOLUTION INTRAVENOUS ONCE
OUTPATIENT
Start: 2025-05-28 | End: 2025-05-28

## 2025-04-01 RX ORDER — FAMOTIDINE 10 MG/ML
20 INJECTION, SOLUTION INTRAVENOUS ONCE
Status: COMPLETED | OUTPATIENT
Start: 2025-04-01 | End: 2025-04-01

## 2025-04-01 RX ORDER — HEPARIN 100 UNIT/ML
500 SYRINGE INTRAVENOUS AS NEEDED
OUTPATIENT
Start: 2025-04-01

## 2025-04-01 RX ADMIN — DEXAMETHASONE 20 MG: 6 TABLET ORAL at 11:10

## 2025-04-01 RX ADMIN — SODIUM CHLORIDE 600 MG: 9 INJECTION, SOLUTION INTRAVENOUS at 11:47

## 2025-04-01 RX ADMIN — DIPHENHYDRAMINE HYDROCHLORIDE 50 MG: 50 INJECTION, SOLUTION INTRAMUSCULAR; INTRAVENOUS at 11:20

## 2025-04-01 RX ADMIN — ACETAMINOPHEN 650 MG: 325 TABLET ORAL at 11:10

## 2025-04-01 RX ADMIN — FAMOTIDINE 20 MG: 10 INJECTION INTRAVENOUS at 11:11

## 2025-04-01 ASSESSMENT — PAIN SCALES - GENERAL: PAINLEVEL_OUTOF10: 0-NO PAIN

## 2025-04-01 NOTE — PATIENT INSTRUCTIONS
You will have an abdomen CT done in 7 weeks    Your (likely final) next rituxan dose will be on or about May 28 (at [facility])

## 2025-04-01 NOTE — PROGRESS NOTES
Rituximab Rates:  90 min total    20% in 30 min:  130.8ml/hr for 65.4ml  80% in 60 min: 261.6ml/hr for 261.6ml

## 2025-04-01 NOTE — PROGRESS NOTES
Patient ID: Adilene Polo is a 79 y.o. female.  Referring Physician: Terry Pandey MD  [address]  [place] [address]  Primary Care Provider: Ebony Carmona MD  Visit Type: Follow Up      Subjective    HPI I am here for rituxan  I'm not sure why it was scheduled for here rather than at [facility] [place] [facility]    Review of Systems   Constitutional: Negative.    HENT:  Negative.     Eyes: Negative.    Respiratory: Negative.     Cardiovascular: Negative.    Gastrointestinal: Negative.    Endocrine: Negative.    Genitourinary: Negative.     Musculoskeletal: Negative.    Skin: Negative.    Neurological: Negative.    Hematological: Negative.    Psychiatric/Behavioral: Negative.          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   has a past surgical history that includes Cholecystectomy (10/28/2014); Gastric bypass (10/28/2014); Incisional breast biopsy (07/20/2018); Thyroidectomy, partial; and Whipple procedure w/ laparoscopy.  Family History   Problem Relation Name Age of Onset    Heart disease Mother          Coronary    Heart disease Father          Coronary    Heart attack Father      Prostate  cancer Father      Breast cancer Sister      Kidney disease Brother      Lung cancer Brother      Prostate cancer Brother      Pulmonary embolism Brother       Oncology History   Follicular lymphoma   2/5/2023 Initial Diagnosis    Follicular lymphoma (Multi)     6/19/2024 - 7/10/2024 Chemotherapy    RiTUXimab (Weekly), 28 Day Cycle      12/9/2024 -  Chemotherapy    RiTUXimab, 8 Week Cycles - Maintenance         Adilene Polo  reports that she quit smoking about 45 years ago. Her smoking use included cigarettes. She has never used smokeless tobacco.  She  reports no history of alcohol use.  She  reports no history of drug use.    Physical Exam  Vitals reviewed.   Constitutional:       Appearance: Normal appearance.   HENT:      Head: Normocephalic.      Mouth/Throat:      Mouth: Mucous membranes are moist.   Eyes:      Extraocular Movements: Extraocular movements intact.      Pupils: Pupils are equal, round, and reactive to light.   Cardiovascular:      Rate and Rhythm: Normal rate and regular rhythm.      Pulses: Normal pulses.      Heart sounds: Normal heart sounds.   Pulmonary:      Effort: Pulmonary effort is normal.      Breath sounds: Normal breath sounds.   Abdominal:      General: Bowel sounds are normal.      Palpations: Abdomen is soft.   Musculoskeletal:         General: Normal range of motion.      Cervical back: Normal range of motion and neck supple.   Skin:     General: Skin is warm.   Neurological:      General: No focal deficit present.      Mental Status: She is alert and oriented to person, place, and time.   Psychiatric:         Mood and Affect: Mood normal.         Behavior: Behavior normal.         WBC   Date/Time Value Ref Range Status   03/27/2025 10:25 AM 3.0 (L) 4.4 - 11.3 x10*3/uL Final   02/11/2025 01:33 PM 5.7 4.4 - 11.3 x10*3/uL Final   01/29/2025 11:32 AM 4.2 (L) 4.4 - 11.3 x10*3/uL Final     nRBC   Date Value Ref Range Status   03/27/2025 0.0 0.0 - 0.0 /100 WBCs Final   02/11/2025 0.0  "0.0 - 0.0 /100 WBCs Final   01/29/2025 0.0 0.0 - 0.0 /100 WBCs Final     RBC   Date Value Ref Range Status   03/27/2025 3.37 (L) 4.00 - 5.20 x10*6/uL Final   02/11/2025 3.19 (L) 4.00 - 5.20 x10*6/uL Final   01/29/2025 3.18 (L) 4.00 - 5.20 x10*6/uL Final     Hemoglobin   Date Value Ref Range Status   03/27/2025 10.9 (L) 12.0 - 16.0 g/dL Final   02/11/2025 10.6 (L) 12.0 - 16.0 g/dL Final   01/29/2025 10.4 (L) 12.0 - 16.0 g/dL Final     Hematocrit   Date Value Ref Range Status   03/27/2025 35.0 (L) 36.0 - 46.0 % Final   02/11/2025 31.3 (L) 36.0 - 46.0 % Final   01/29/2025 32.8 (L) 36.0 - 46.0 % Final     MCV   Date/Time Value Ref Range Status   03/27/2025 10:25  (H) 80 - 100 fL Final   02/11/2025 01:33 PM 98 80 - 100 fL Final   01/29/2025 11:32  (H) 80 - 100 fL Final     MCH   Date/Time Value Ref Range Status   03/27/2025 10:25 AM 32.3 26.0 - 34.0 pg Final   02/11/2025 01:33 PM 33.2 26.0 - 34.0 pg Final   01/29/2025 11:32 AM 32.7 26.0 - 34.0 pg Final     MCHC   Date/Time Value Ref Range Status   03/27/2025 10:25 AM 31.1 (L) 32.0 - 36.0 g/dL Final   02/11/2025 01:33 PM 33.9 32.0 - 36.0 g/dL Final   01/29/2025 11:32 AM 31.7 (L) 32.0 - 36.0 g/dL Final     RDW   Date/Time Value Ref Range Status   03/27/2025 10:25 AM 13.5 11.5 - 14.5 % Final   02/11/2025 01:33 PM 14.0 11.5 - 14.5 % Final   01/29/2025 11:32 AM 14.4 11.5 - 14.5 % Final     Platelets   Date/Time Value Ref Range Status   03/27/2025 10:25  150 - 450 x10*3/uL Final   02/11/2025 01:33  150 - 450 x10*3/uL Final   01/29/2025 11:32  150 - 450 x10*3/uL Final     No results found for: \"MPV\"  Neutrophils %   Date/Time Value Ref Range Status   03/27/2025 10:25 AM 58.4 40.0 - 80.0 % Final   02/11/2025 01:33 PM 79.2 40.0 - 80.0 % Final   01/29/2025 11:32 AM 67.4 40.0 - 80.0 % Final     Immature Granulocytes %, Automated   Date/Time Value Ref Range Status   03/27/2025 10:25 AM 2.0 (H) 0.0 - 0.9 % Final     Comment:     Immature Granulocyte " Count (IG) includes promyelocytes, myelocytes and metamyelocytes but does not include bands. Percent differential counts (%) should be interpreted in the context of the absolute cell counts (cells/UL).   02/11/2025 01:33 PM 0.7 0.0 - 0.9 % Final     Comment:     Immature Granulocyte Count (IG) includes promyelocytes, myelocytes and metamyelocytes but does not include bands. Percent differential counts (%) should be interpreted in the context of the absolute cell counts (cells/UL).   01/29/2025 11:32 AM 0.2 0.0 - 0.9 % Final     Comment:     Immature Granulocyte Count (IG) includes promyelocytes, myelocytes and metamyelocytes but does not include bands. Percent differential counts (%) should be interpreted in the context of the absolute cell counts (cells/UL).     Lymphocytes %   Date/Time Value Ref Range Status   03/27/2025 10:25 AM 29.2 13.0 - 44.0 % Final   02/11/2025 01:33 PM 11.5 13.0 - 44.0 % Final   01/29/2025 11:32 AM 22.3 13.0 - 44.0 % Final     Monocytes %   Date/Time Value Ref Range Status   03/27/2025 10:25 AM 8.7 2.0 - 10.0 % Final   02/11/2025 01:33 PM 7.9 2.0 - 10.0 % Final   01/29/2025 11:32 AM 8.6 2.0 - 10.0 % Final     Eosinophils %   Date/Time Value Ref Range Status   03/27/2025 10:25 AM 0.7 0.0 - 6.0 % Final   02/11/2025 01:33 PM 0.2 0.0 - 6.0 % Final   01/29/2025 11:32 AM 1.0 0.0 - 6.0 % Final     Basophils %   Date/Time Value Ref Range Status   03/27/2025 10:25 AM 1.0 0.0 - 2.0 % Final   02/11/2025 01:33 PM 0.5 0.0 - 2.0 % Final   01/29/2025 11:32 AM 0.5 0.0 - 2.0 % Final     Neutrophils Absolute   Date/Time Value Ref Range Status   03/27/2025 10:25 AM 1.74 1.60 - 5.50 x10*3/uL Final     Comment:     Percent differential counts (%) should be interpreted in the context of the absolute cell counts (cells/uL).   02/11/2025 01:33 PM 4.53 1.60 - 5.50 x10*3/uL Final     Comment:     Percent differential counts (%) should be interpreted in the context of the absolute cell counts (cells/uL).  "  01/29/2025 11:32 AM 2.84 1.60 - 5.50 x10*3/uL Final     Comment:     Percent differential counts (%) should be interpreted in the context of the absolute cell counts (cells/uL).     Immature Granulocytes Absolute, Automated   Date/Time Value Ref Range Status   03/27/2025 10:25 AM 0.06 0.00 - 0.50 x10*3/uL Final   02/11/2025 01:33 PM 0.04 0.00 - 0.50 x10*3/uL Final   01/29/2025 11:32 AM 0.01 0.00 - 0.50 x10*3/uL Final     Lymphocytes Absolute   Date/Time Value Ref Range Status   03/27/2025 10:25 AM 0.87 0.80 - 3.00 x10*3/uL Final   02/11/2025 01:33 PM 0.66 (L) 0.80 - 3.00 x10*3/uL Final   01/29/2025 11:32 AM 0.94 0.80 - 3.00 x10*3/uL Final     Monocytes Absolute   Date/Time Value Ref Range Status   03/27/2025 10:25 AM 0.26 0.05 - 0.80 x10*3/uL Final   02/11/2025 01:33 PM 0.45 0.05 - 0.80 x10*3/uL Final   01/29/2025 11:32 AM 0.36 0.05 - 0.80 x10*3/uL Final     Eosinophils Absolute   Date/Time Value Ref Range Status   03/27/2025 10:25 AM 0.02 0.00 - 0.40 x10*3/uL Final   02/11/2025 01:33 PM 0.01 0.00 - 0.40 x10*3/uL Final   01/29/2025 11:32 AM 0.04 0.00 - 0.40 x10*3/uL Final     Basophils Absolute   Date/Time Value Ref Range Status   03/27/2025 10:25 AM 0.03 0.00 - 0.10 x10*3/uL Final   02/11/2025 01:33 PM 0.03 0.00 - 0.10 x10*3/uL Final   01/29/2025 11:32 AM 0.02 0.00 - 0.10 x10*3/uL Final       No components found for: \"PT\"  aPTT   Date/Time Value Ref Range Status   08/09/2022 01:40 PM 33 26 - 39 sec Final     Comment:       THE APTT IS NO LONGER USED FOR MONITORING     UNFRACTIONATED HEPARIN THERAPY.    FOR MONITORING HEPARIN THERAPY,     USE THE HEPARIN ASSAY.     06/17/2021 08:16 AM 33 25 - 35 sec Final     Comment:       THE APTT IS NO LONGER USED FOR MONITORING     UNFRACTIONATED HEPARIN THERAPY.    FOR MONITORING HEPARIN THERAPY,     USE THE HEPARIN ASSAY.     03/02/2020 04:34 PM 63 (H) 28 - 38 sec Final     Comment:       THE APTT IS NO LONGER USED FOR MONITORING     UNFRACTIONATED HEPARIN THERAPY.    FOR " MONITORING HEPARIN THERAPY,     USE THE HEPARIN ASSAY.       Medication Documentation Review Audit       Reviewed by Janet Mendieta MA (Medical Assistant) on 25 at 1000      Medication Order Taking? Sig Documenting Provider Last Dose Status   b complex 0.4 mg tablet [id number] Yes Take 1 tablet by mouth once daily. Historical MD Sylvia Taking Active   blood sugar diagnostic (Accu-Chek SmartView Test Strip) strip [id number] Yes USE 1 STRIP TWICE DAILY. Historical Provider, MD Taking Active   blood-glucose meter misc [id number] Yes Check sugars daily Ebony Carmona MD Taking Active   CALCIUM ORAL [id number] Yes Take 1 tablet by mouth 1 (one) time each day. Historical Provider, MD Taking Active   cyanocobalamin (Vitamin B-12) 1,000 mcg/mL injection [id number] Yes Inject 1 mL (1,000 mcg) into the muscle every 30 (thirty) days. Ebony Carmona MD  Active   denosumab (Prolia) 60 mg/mL syringe [id number] Yes Inject 1 mL (60 mg) under the skin every 6 months. Historical MD Sylvia Taking Active   diphenhydrAMINE (BENADryl) 50 mg capsule [id number]  Take one capsule (50 mg) by mouth one hour before contrast medium administration Terry Pandey MD   24 2359   docusate sodium (STOOL SOFTENER ORAL) [id number] Yes Take 1 capsule by mouth 1 (one) time each day. Historical Provider, MD Taking Active   gabapentin (Neurontin) 300 mg capsule [id number] Yes Take 2 capsules (600 mg) by mouth once daily at bedtime. Reese Villarreal MD  Active   Discontinued 25 1258   levothyroxine (Synthroid, Levoxyl) 100 mcg tablet [id number] Yes Take 1 tablet (100 mcg) by mouth once daily in the morning. Take before meals. Ebony Carmona MD  Active   magnesium oxide (Mag-Ox) 250 mg magnesium tablet [id number] Yes Take 1 tablet (250 mg) by mouth 2 times a day. Historical MD Sylvia Taking Active   metoprolol succinate XL (Toprol-XL) 25 mg 24 hr tablet [id number] Yes TAKE 1 TABLET BY MOUTH EVERY DAY Mychal ANDERSON Solis MD  Active   NON FORMULARY [id number] Yes  Vitamin D 50 MCG (2000 UT) Oral Tablet; Take 1 tablet daily Historical Provider, MD Taking Active   prochlorperazine (Compazine) 10 mg tablet [id number] Yes Take 1 tablet (10 mg) by mouth every 6 hours if needed for nausea or vomiting. Terry Pandey MD Not Taking Active   sulfaSALAzine (Azulfidine) 500 mg tablet [id number] Yes Take 2 tablets (1,000 mg) by mouth 2 times a day. 1,000 mg in the morning and 1500mg at night Historical Provider, MD Taking Active   tafamidis (Vyndamax) 61 mg capsule [id number] Yes Take 1 capsule (61 mg) by mouth once daily. Dejan Fortune,   Active   vitamins A and D capsule [id number] Yes Take 3,000 Units by mouth every other day. Shai Cerda MD Taking Active   vutrisiran sodium (AMVUTTRA SUBQ) [id number] Yes Inject under the skin. Historical Provider, MD Taking Active   WHEAT DEXTRIN ORAL [id number] Yes Benefiber Oral Powder; Use as directed Historical Provider, MD Taking Active   zinc gluconate 50 mg tablet [id number] Yes Take 1 tablet (50 mg) by mouth once daily. 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 (20+ lymph nodes), 5% focus of grade 3B lymphoma found in 1 lymph node  -bone marrow bx done in 2022 was negative, however, that doesn't completely prove her marrow was not involved due to sampling variant; nearly all patients with follicular lymphoma ultimately have marrow involvement  --8/15/2023 PET scan reviewed--left para-aortic lymph node (secondary to lymphoma) is slightly enlarged and slightly PET avid  -reviewed 11/13/2023 CT scan--her para-aortic nodes are enlarging  -she is feeling more and more fatigued as times goes on--some of it clearly is due to her cardiac amyloidosis, however, her lymphoma and anemia clearly are contributing to her fatigue as well  -labs done on 4/25/2024 reviewed--wbc 3.6, hgb 10, , plt 237,000, , SPEP/IF  negative, serum kappa LC 2.85, TIBC 317, sat 40%, ferritin 354, retic 1.4%, direct milena negative, haptoglobin <10  -treating even with a course of rituxan may very likely help with her anemia  -here for interval followup via telephone  -completed a course of rituxan (6/19, 6/26, 7/3 and 7/10/2024)  -labs done on 8/5/2024 reviewed (somehow [facility] lab missed CBC, COMP, and LDH orders)--haptoglobin 63, retic 1.2%, TIBC 298, sat 39%, ferritin 333  -labs now do not seem to be consistent with hemolysis  -CT scan done on 8/7/2024 reviewed--multiple scattered punctate 1-2 mm nodules throughout the lungs; densely calcified granuloma in the LLL; no mediastinal hilar or axillary lymphadenopathy; diffuse fatty infiltration of the liver; post Whipple procedure; spleen is normal in size; mild diffuse mesenteric haziness is again noted; interval decrease in retroperitoneal lymphadenopathy; left para-aortic node was 20 x 19 mm now 15 x 12; another node was 18 x 15 mm now 15 x 11  -here for interval followup via telephone  -labs done on 11/18/2024 included CBC + COMP + LDH + haptoglobin + retic  -results reviewed--wbc 3.6, hgb 9.6, , plt 242,000, ANC 2180, creatinine 0.92, calcium 9,4, alk phos 177, AST 29, ALT 20, , haptoglobin <30, retic 1.7%  -she is more anemic again, with high MCV, undetectable haptoglobin--strongly suggestive that her lymphoma is inducing hemolysis again  -she now would for sure benefit from maintenance rituximab  -here for interval followup via telephone  -last dose of rituxan received at [facility] on 12/9/2024  -next dose of maintenance rituxan due 2/4/2025 at [facility]  -here for interval followup  -on 2/11/2025 she was walking into her kitchen, slipped and fell on her right side, landing on her right hip; xray of right pelvis showed questionable buckle fracture of right pubic ramus; was discharged with recs to take tylenol PRN; she eventually followed up with orthopedist  -labs done on 3/27/2025 included  CBC + COMP + LDH + uric acid + haptoglobin  -results reviewed--wbc 3.0, hgb 10.9, , plt 265,000, ANC 1740, creatinine 0.80, calcium 8.9, alk phos 334, AST 35, total bili 0.7, ALT 21, , uric acid 4.2, haptoglobin 43  -will proceed with rituxan today and again in about 8 weeks, and will check another abdominal CT scan in 7 weeks  -benefits, risks, potential morbidity related to rituximab were reviewed with Adilene and she provided informed consent to proceed  -today she will receive tylenol PO + benadryl IV + famoitidine  IV + decadron PO + rituximab 375 mg/m2 IV  -her next dose of maintenance rituxan will be at [facility] in 8 weeks        2) cardiac amyloidosis  -follows with Dr Romero  -on vyndamax  -also recently started 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)--which can deplete vitamin A levels  -so she is currently also taking vitamin A supplement     3) hypothyroidism  -on levothyroxine     4) hypertension  -on metoprolol     5) IPMN  -s/p Whipple resection by Dr Ventura on 3/2/2020  -on creon for pancreatic insufficiency        Problem List Items Addressed This Visit             ICD-10-CM    Follicular lymphoma - Primary C82.90    Relevant Orders    Clinic Appointment Request Virtual Est; TERRY JUAN; [email or web address]    Infusion Appointment Request [facility] 1F [email or web address]    Haptoglobin    CBC and Auto Differential    Comprehensive metabolic panel    Lactate dehydrogenase    Uric Acid    CT abdomen pelvis wo IV contrast    Autoimmune hemolytic anemia D59.10    Relevant Orders    Clinic Appointment Request Virtual Est; TERRY JUAN; [email or web address]    Infusion Appointment Request [facility] 1F [email or web address]    Haptoglobin    CBC and Auto Differential    Comprehensive metabolic panel    Lactate dehydrogenase    Uric Acid            Terry Juan MD

## 2025-04-04 ASSESSMENT — ENCOUNTER SYMPTOMS
HEMATOLOGIC/LYMPHATIC NEGATIVE: 1
CARDIOVASCULAR NEGATIVE: 1
EYES NEGATIVE: 1
PSYCHIATRIC NEGATIVE: 1
NEUROLOGICAL NEGATIVE: 1
MUSCULOSKELETAL NEGATIVE: 1
GASTROINTESTINAL NEGATIVE: 1
CONSTITUTIONAL NEGATIVE: 1
RESPIRATORY NEGATIVE: 1
ENDOCRINE NEGATIVE: 1

## 2025-04-06 ENCOUNTER — OFFICE VISIT (OUTPATIENT)
Dept: URGENT CARE | Age: 80
End: 2025-04-06
Payer: MEDICARE

## 2025-04-06 VITALS
TEMPERATURE: 97.1 F | WEIGHT: 121 LBS | BODY MASS INDEX: 17.92 KG/M2 | RESPIRATION RATE: 18 BRPM | DIASTOLIC BLOOD PRESSURE: 79 MMHG | OXYGEN SATURATION: 99 % | HEIGHT: 69 IN | SYSTOLIC BLOOD PRESSURE: 157 MMHG | HEART RATE: 78 BPM

## 2025-04-06 DIAGNOSIS — S61.210A LACERATION OF RIGHT INDEX FINGER WITHOUT FOREIGN BODY WITHOUT DAMAGE TO NAIL, INITIAL ENCOUNTER: Primary | ICD-10-CM

## 2025-04-06 PROCEDURE — 90471 IMMUNIZATION ADMIN: CPT | Performed by: PHYSICIAN ASSISTANT

## 2025-04-06 PROCEDURE — 3078F DIAST BP <80 MM HG: CPT | Performed by: PHYSICIAN ASSISTANT

## 2025-04-06 PROCEDURE — 12001 RPR S/N/AX/GEN/TRNK 2.5CM/<: CPT | Performed by: PHYSICIAN ASSISTANT

## 2025-04-06 PROCEDURE — 1159F MED LIST DOCD IN RCRD: CPT | Performed by: PHYSICIAN ASSISTANT

## 2025-04-06 PROCEDURE — 99203 OFFICE O/P NEW LOW 30 MIN: CPT | Performed by: PHYSICIAN ASSISTANT

## 2025-04-06 PROCEDURE — 1123F ACP DISCUSS/DSCN MKR DOCD: CPT | Performed by: PHYSICIAN ASSISTANT

## 2025-04-06 PROCEDURE — 90715 TDAP VACCINE 7 YRS/> IM: CPT | Performed by: PHYSICIAN ASSISTANT

## 2025-04-06 PROCEDURE — 1157F ADVNC CARE PLAN IN RCRD: CPT | Performed by: PHYSICIAN ASSISTANT

## 2025-04-06 PROCEDURE — 3077F SYST BP >= 140 MM HG: CPT | Performed by: PHYSICIAN ASSISTANT

## 2025-04-06 PROCEDURE — 1036F TOBACCO NON-USER: CPT | Performed by: PHYSICIAN ASSISTANT

## 2025-04-06 ASSESSMENT — ENCOUNTER SYMPTOMS
ENDOCRINE NEGATIVE: 1
GASTROINTESTINAL NEGATIVE: 1
HEMATOLOGIC/LYMPHATIC NEGATIVE: 1
CARDIOVASCULAR NEGATIVE: 1
RESPIRATORY NEGATIVE: 1
PSYCHIATRIC NEGATIVE: 1
WEAKNESS: 0
MUSCULOSKELETAL NEGATIVE: 1
ALLERGIC/IMMUNOLOGIC NEGATIVE: 1
WOUND: 1
EYES NEGATIVE: 1
CONSTITUTIONAL NEGATIVE: 1
NUMBNESS: 0

## 2025-04-06 NOTE — PATIENT INSTRUCTIONS
Wash wound with mild soap and water daily  Bacitracin and dry dressing daily  Start to keep wound open to the air in 2-3 days when it can be kept clean and dry  Suture removal in 10 days  Pcp follow up this week for wound check  ER visit anytime 24/7 for acute worsening or changing condition

## 2025-04-06 NOTE — PROGRESS NOTES
Subjective   Patient ID: Adilene Polo is a 79 y.o. female. They present today with a chief complaint of Laceration (Finger laceration x today. Pt was washing dishes and didn't realize a knife was in the sink ).    History of Present Illness    History provided by:  Patient   used: No    Laceration    This is a 79 yr old female here for right index finger laceration. Washing dishes this am and a knife cut the finger. Persistant bleeding wound prompting UC visit. No difficulty with finger ROM, parasthesias or weakness. Last tetanus unknown.   Past Medical History  Allergies as of 04/06/2025 - Reviewed 04/06/2025   Allergen Reaction Noted    Cephalosporins Anaphylaxis 04/27/2005    Penicillins Anaphylaxis 02/05/2023    Ace inhibitors Cough 06/08/2006    Atorvastatin Unknown 02/05/2023    Iodinated contrast media Unknown 02/05/2023     (Not in a hospital admission)     Past Medical History:   Diagnosis Date    Biceps tendinopathy 11/21/2023    History of carpal tunnel surgery of left wrist 10/10/2019    History of GI bleed     History of malignant melanoma 08/06/2018    Hoarseness 02/05/2023    She had evaluation by ENT and there is mild vocal cord dysfunction but no mass.    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       Past Surgical History:   Procedure Laterality Date    CHOLECYSTECTOMY  10/28/2014    Cholecystectomy    GASTRIC BYPASS  10/28/2014    Gastric Surgery For Morbid Obesity Bypass With Ed-en-Y     "INCISIONAL BREAST BIOPSY  07/20/2018    Incisional Breast Biopsy    THYROIDECTOMY, PARTIAL      WHIPPLE PROCEDURE W/ LAPAROSCOPY          reports that she quit smoking about 45 years ago. Her smoking use included cigarettes. She has never used smokeless tobacco. She reports that she does not drink alcohol and does not use drugs.    Review of Systems  Review of Systems   Constitutional: Negative.    HENT: Negative.     Eyes: Negative.    Respiratory: Negative.     Cardiovascular: Negative.    Gastrointestinal: Negative.    Endocrine: Negative.    Genitourinary: Negative.    Musculoskeletal: Negative.    Skin:  Positive for wound.   Allergic/Immunologic: Negative.    Neurological:  Negative for weakness and numbness.   Hematological: Negative.    Psychiatric/Behavioral: Negative.     All other systems reviewed and are negative.    Objective    Vitals:    04/06/25 1139   BP: 157/79   Pulse: 78   Resp: 18   Temp: 36.2 °C (97.1 °F)   TempSrc: Oral   SpO2: 99%   Weight: 54.9 kg (121 lb)   Height: 1.753 m (5' 9\")     No LMP recorded.    Physical Exam  Vitals and nursing note reviewed.   Constitutional:       Appearance: Normal appearance.   HENT:      Head: Normocephalic and atraumatic.   Cardiovascular:      Rate and Rhythm: Normal rate and regular rhythm.   Pulmonary:      Effort: Pulmonary effort is normal.      Breath sounds: Normal breath sounds.   Skin:     General: Skin is warm and dry.      Comments: 1.5 cm laceration dorsal surface of right index finger, no active bleeding  FROM, distal n-v intact   Neurological:      General: No focal deficit present.      Mental Status: She is alert and oriented to person, place, and time.   Psychiatric:         Mood and Affect: Mood normal.         Behavior: Behavior normal.       Procedures  Laceration repair right index finger wound-1% plain lidocaine local anesthesia placed. Hibiclens and sterile saline lavaged wound. Sterile draping placed. 4 4-0 nylon simple interrupted " sutures placed. Hemostasis achieved. Pt tolerated procedure well. Bacitracin, guaze and bandaid applied    Point of Care Test & Imaging Results from this visit  No results found for this visit on 04/06/25.   Imaging  No results found.    Cardiology, Vascular, and Other Imaging  No other imaging results found for the past 2 days      Diagnostic study results (if any) were reviewed by Tabitha Robins PA-C.    Assessment/Plan   Allergies, medications, history, and pertinent labs/EKGs/Imaging reviewed by Tabitha Robins PA-C.        Orders and Diagnoses  Diagnoses and all orders for this visit:  Laceration of right index finger without foreign body without damage to nail, initial encounter  Other orders  -     Tdap vaccine, age 7 years and older  (BOOSTRIX)    Plan:  Daily wound care  Bacitracin/DSD daily  Tetanus updated  Start to keep wound open to the air in 2-3 days when it can be kept clean and dry  SR in 10 days  Pcp follow up this week for wound check  ER visit anytime 24/7 for acute worsening or changing condition    Patient disposition: Home    Electronically signed by Tabitha Robins PA-C  1:35 PM

## 2025-04-08 DIAGNOSIS — E11.9 TYPE 2 DIABETES MELLITUS WITHOUT COMPLICATION, WITHOUT LONG-TERM CURRENT USE OF INSULIN: Primary | ICD-10-CM

## 2025-04-08 RX ORDER — INSULIN PUMP SYRINGE, 3 ML
EACH MISCELLANEOUS
Qty: 1 EACH | Refills: 0 | Status: SHIPPED | OUTPATIENT
Start: 2025-04-08 | End: 2026-04-08

## 2025-04-16 ENCOUNTER — OFFICE VISIT (OUTPATIENT)
Dept: URGENT CARE | Age: 80
End: 2025-04-16
Payer: MEDICARE

## 2025-04-16 DIAGNOSIS — Z48.02 VISIT FOR SUTURE REMOVAL: Primary | ICD-10-CM

## 2025-04-16 ASSESSMENT — ENCOUNTER SYMPTOMS
WOUND: 1
CHILLS: 0
DIAPHORESIS: 0
FEVER: 0
COLOR CHANGE: 0

## 2025-04-16 NOTE — PROGRESS NOTES
Subjective   Patient ID: Adilene Polo is a 79 y.o. female. They present today with a chief complaint of No chief complaint on file..    History of Present Illness  Suture removal.    Four interrupted sutures placed in right index finger on 4/6.    Denies fever, chills, sweats, drainage, erythema, increasing pain.       Sutures removed by ever jang after my physical exam.         Past Medical History  Allergies as of 04/16/2025 - Reviewed 04/16/2025   Allergen Reaction Noted    Cephalosporins Anaphylaxis 04/27/2005    Penicillins Anaphylaxis 02/05/2023    Ace inhibitors Cough 06/08/2006    Atorvastatin Unknown 02/05/2023    Iodinated contrast media Unknown 02/05/2023       Prescriptions Prior to Admission[1]     Medical History[2]    Surgical History[3]     reports that she quit smoking about 45 years ago. Her smoking use included cigarettes. She has never used smokeless tobacco. She reports that she does not drink alcohol and does not use drugs.    Review of Systems  Review of Systems   Constitutional:  Negative for chills, diaphoresis and fever.   Skin:  Positive for wound. Negative for color change.   All other systems reviewed and are negative.                                 Objective    There were no vitals filed for this visit.  No LMP recorded.    Physical Exam  Constitutional:       General: She is not in acute distress.  Musculoskeletal:         General: No swelling or tenderness.   Skin:     Findings: No erythema.      Comments: Four intact interrupted sutures right index finger well healing, no erythema, drainage, swelling.   Neurological:      Mental Status: She is alert.         Procedures    Point of Care Test & Imaging Results from this visit  No results found for this visit on 04/16/25.   Imaging  No results found.    Cardiology, Vascular, and Other Imaging  No other imaging results found for the past 2 days      Diagnostic study results (if any) were reviewed by Yoselyn Otoole  CINTHIA.    Assessment/Plan   Allergies, medications, history, and pertinent labs/EKGs/Imaging reviewed by Yoselyn Otoole PA-C.         Orders and Diagnoses  There are no diagnoses linked to this encounter.    Medical Admin Record      Patient disposition: Home    Electronically signed by Yoselyn Otoole PA-C  12:38 PM           [1] (Not in a hospital admission)  [2]   Past Medical History:  Diagnosis Date    Biceps tendinopathy 11/21/2023    History of carpal tunnel surgery of left wrist 10/10/2019    History of GI bleed     History of malignant melanoma 08/06/2018    Hoarseness 02/05/2023    She had evaluation by ENT and there is mild vocal cord dysfunction but no mass.    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   [3]   Past Surgical History:  Procedure Laterality Date    CHOLECYSTECTOMY  10/28/2014    Cholecystectomy    GASTRIC BYPASS  10/28/2014    Gastric Surgery For Morbid Obesity Bypass With Ed-en-Y    INCISIONAL BREAST BIOPSY  07/20/2018    Incisional Breast Biopsy    THYROIDECTOMY, PARTIAL      WHIPPLE PROCEDURE W/ LAPAROSCOPY

## 2025-05-19 ENCOUNTER — HOSPITAL ENCOUNTER (OUTPATIENT)
Dept: RADIOLOGY | Facility: CLINIC | Age: 80
Discharge: HOME | End: 2025-05-19
Payer: MEDICARE

## 2025-05-19 DIAGNOSIS — C82.93 FOLLICULAR LYMPHOMA OF INTRA-ABDOMINAL LYMPH NODES, UNSPECIFIED FOLLICULAR LYMPHOMA TYPE: ICD-10-CM

## 2025-05-19 PROCEDURE — 74176 CT ABD & PELVIS W/O CONTRAST: CPT

## 2025-05-19 PROCEDURE — 74176 CT ABD & PELVIS W/O CONTRAST: CPT | Performed by: RADIOLOGY

## 2025-05-27 ENCOUNTER — TELEMEDICINE (OUTPATIENT)
Dept: HEMATOLOGY/ONCOLOGY | Facility: CLINIC | Age: 80
End: 2025-05-27
Payer: MEDICARE

## 2025-05-27 DIAGNOSIS — C82.93 FOLLICULAR LYMPHOMA OF INTRA-ABDOMINAL LYMPH NODES, UNSPECIFIED FOLLICULAR LYMPHOMA TYPE: Primary | ICD-10-CM

## 2025-05-27 DIAGNOSIS — D49.0 IPMN (INTRADUCTAL PAPILLARY MUCINOUS NEOPLASM): ICD-10-CM

## 2025-05-27 DIAGNOSIS — I10 PRIMARY HYPERTENSION: ICD-10-CM

## 2025-05-27 DIAGNOSIS — E06.3 HYPOTHYROIDISM DUE TO HASHIMOTO THYROIDITIS: ICD-10-CM

## 2025-05-27 DIAGNOSIS — M05.9 RHEUMATOID ARTHRITIS WITH RHEUMATOID FACTOR, UNSPECIFIED: ICD-10-CM

## 2025-05-27 DIAGNOSIS — E85.4 CARDIAC AMYLOIDOSIS: ICD-10-CM

## 2025-05-27 DIAGNOSIS — F32.2 MAJOR DEPRESSIVE DISORDER, SINGLE EPISODE, SEVERE WITHOUT PSYCHOTIC FEATURES (MULTI): ICD-10-CM

## 2025-05-27 DIAGNOSIS — D59.10 AUTOIMMUNE HEMOLYTIC ANEMIA: ICD-10-CM

## 2025-05-27 DIAGNOSIS — J44.9 CHRONIC OBSTRUCTIVE PULMONARY DISEASE, UNSPECIFIED COPD TYPE (MULTI): ICD-10-CM

## 2025-05-27 DIAGNOSIS — E85.1 NEUROPATHIC HEREDOFAMILIAL AMYLOIDOSIS (MULTI): ICD-10-CM

## 2025-05-27 DIAGNOSIS — I43 CARDIAC AMYLOIDOSIS: ICD-10-CM

## 2025-05-27 DIAGNOSIS — E11.9 TYPE 2 DIABETES MELLITUS WITHOUT COMPLICATION, WITHOUT LONG-TERM CURRENT USE OF INSULIN: ICD-10-CM

## 2025-05-27 DIAGNOSIS — E11.42 TYPE 2 DIABETES MELLITUS WITH DIABETIC POLYNEUROPATHY, WITHOUT LONG-TERM CURRENT USE OF INSULIN: ICD-10-CM

## 2025-05-27 PROCEDURE — 99213 OFFICE O/P EST LOW 20 MIN: CPT | Performed by: INTERNAL MEDICINE

## 2025-05-27 PROCEDURE — 1160F RVW MEDS BY RX/DR IN RCRD: CPT | Performed by: INTERNAL MEDICINE

## 2025-05-27 PROCEDURE — 1159F MED LIST DOCD IN RCRD: CPT | Performed by: INTERNAL MEDICINE

## 2025-05-27 PROCEDURE — G2211 COMPLEX E/M VISIT ADD ON: HCPCS | Performed by: INTERNAL MEDICINE

## 2025-05-27 ASSESSMENT — ENCOUNTER SYMPTOMS
PSYCHIATRIC NEGATIVE: 1
HEMATOLOGIC/LYMPHATIC NEGATIVE: 1
NEUROLOGICAL NEGATIVE: 1
MUSCULOSKELETAL NEGATIVE: 1
EYES NEGATIVE: 1
CONSTIPATION: 1
ABDOMINAL DISTENTION: 1
CARDIOVASCULAR NEGATIVE: 1
CONSTITUTIONAL NEGATIVE: 1
RESPIRATORY NEGATIVE: 1

## 2025-05-28 ENCOUNTER — INFUSION (OUTPATIENT)
Dept: HEMATOLOGY/ONCOLOGY | Facility: HOSPITAL | Age: 80
End: 2025-05-28
Payer: MEDICARE

## 2025-05-28 ENCOUNTER — LAB (OUTPATIENT)
Dept: LAB | Facility: HOSPITAL | Age: 80
End: 2025-05-28
Payer: MEDICARE

## 2025-05-28 VITALS
BODY MASS INDEX: 17.32 KG/M2 | OXYGEN SATURATION: 100 % | WEIGHT: 117.28 LBS | TEMPERATURE: 97.5 F | RESPIRATION RATE: 18 BRPM | HEART RATE: 67 BPM | SYSTOLIC BLOOD PRESSURE: 128 MMHG | DIASTOLIC BLOOD PRESSURE: 63 MMHG

## 2025-05-28 DIAGNOSIS — C82.93 FOLLICULAR LYMPHOMA OF INTRA-ABDOMINAL LYMPH NODES, UNSPECIFIED FOLLICULAR LYMPHOMA TYPE: ICD-10-CM

## 2025-05-28 DIAGNOSIS — E11.9 TYPE 2 DIABETES MELLITUS WITHOUT COMPLICATION, WITHOUT LONG-TERM CURRENT USE OF INSULIN: ICD-10-CM

## 2025-05-28 DIAGNOSIS — D59.10 AUTOIMMUNE HEMOLYTIC ANEMIA: ICD-10-CM

## 2025-05-28 LAB
ALBUMIN SERPL BCP-MCNC: 4.2 G/DL (ref 3.4–5)
ALP SERPL-CCNC: 179 U/L (ref 33–136)
ALT SERPL W P-5'-P-CCNC: 17 U/L (ref 7–45)
ANION GAP SERPL CALC-SCNC: 13 MMOL/L (ref 10–20)
AST SERPL W P-5'-P-CCNC: 27 U/L (ref 9–39)
BASOPHILS # BLD AUTO: 0.02 X10*3/UL (ref 0–0.1)
BASOPHILS NFR BLD AUTO: 1 %
BILIRUB SERPL-MCNC: 0.7 MG/DL (ref 0–1.2)
BUN SERPL-MCNC: 27 MG/DL (ref 6–23)
BURR CELLS BLD QL SMEAR: NORMAL
CALCIUM SERPL-MCNC: 9 MG/DL (ref 8.6–10.3)
CHLORIDE SERPL-SCNC: 105 MMOL/L (ref 98–107)
CO2 SERPL-SCNC: 27 MMOL/L (ref 21–32)
CREAT SERPL-MCNC: 0.8 MG/DL (ref 0.5–1.05)
EGFRCR SERPLBLD CKD-EPI 2021: 75 ML/MIN/1.73M*2
EOSINOPHIL # BLD AUTO: 0.02 X10*3/UL (ref 0–0.4)
EOSINOPHIL NFR BLD AUTO: 1 %
ERYTHROCYTE [DISTWIDTH] IN BLOOD BY AUTOMATED COUNT: 14.1 % (ref 11.5–14.5)
EST. AVERAGE GLUCOSE BLD GHB EST-MCNC: 65 MG/DL
GLUCOSE SERPL-MCNC: 102 MG/DL (ref 74–99)
HAPTOGLOB SERPL NEPH-MCNC: 34 MG/DL (ref 30–200)
HBA1C MFR BLD: 3.9 % (ref ?–5.7)
HCT VFR BLD AUTO: 35.3 % (ref 36–46)
HGB BLD-MCNC: 10.6 G/DL (ref 12–16)
IMM GRANULOCYTES # BLD AUTO: 0.01 X10*3/UL (ref 0–0.5)
IMM GRANULOCYTES NFR BLD AUTO: 0.5 % (ref 0–0.9)
LDH SERPL L TO P-CCNC: 248 U/L (ref 84–246)
LYMPHOCYTES # BLD AUTO: 0.99 X10*3/UL (ref 0.8–3)
LYMPHOCYTES NFR BLD AUTO: 48.8 %
MCH RBC QN AUTO: 30.9 PG (ref 26–34)
MCHC RBC AUTO-ENTMCNC: 30 G/DL (ref 32–36)
MCV RBC AUTO: 103 FL (ref 80–100)
MONOCYTES # BLD AUTO: 0.23 X10*3/UL (ref 0.05–0.8)
MONOCYTES NFR BLD AUTO: 11.3 %
NEUTROPHILS # BLD AUTO: 0.76 X10*3/UL (ref 1.6–5.5)
NEUTROPHILS NFR BLD AUTO: 37.4 %
NRBC BLD-RTO: 0 /100 WBCS (ref 0–0)
OVALOCYTES BLD QL SMEAR: NORMAL
PLATELET # BLD AUTO: 248 X10*3/UL (ref 150–450)
POTASSIUM SERPL-SCNC: 3.9 MMOL/L (ref 3.5–5.3)
PROT SERPL-MCNC: 7.3 G/DL (ref 6.4–8.2)
RBC # BLD AUTO: 3.43 X10*6/UL (ref 4–5.2)
RBC MORPH BLD: NORMAL
SODIUM SERPL-SCNC: 141 MMOL/L (ref 136–145)
URATE SERPL-MCNC: 3.6 MG/DL (ref 2.3–6.7)
WBC # BLD AUTO: 2 X10*3/UL (ref 4.4–11.3)

## 2025-05-28 PROCEDURE — 96413 CHEMO IV INFUSION 1 HR: CPT

## 2025-05-28 PROCEDURE — 83010 ASSAY OF HAPTOGLOBIN QUANT: CPT | Performed by: INTERNAL MEDICINE

## 2025-05-28 PROCEDURE — 85025 COMPLETE CBC W/AUTO DIFF WBC: CPT | Performed by: INTERNAL MEDICINE

## 2025-05-28 PROCEDURE — 36415 COLL VENOUS BLD VENIPUNCTURE: CPT | Performed by: INTERNAL MEDICINE

## 2025-05-28 PROCEDURE — 80053 COMPREHEN METABOLIC PANEL: CPT | Performed by: INTERNAL MEDICINE

## 2025-05-28 PROCEDURE — 2500000001 HC RX 250 WO HCPCS SELF ADMINISTERED DRUGS (ALT 637 FOR MEDICARE OP): Performed by: INTERNAL MEDICINE

## 2025-05-28 PROCEDURE — 96375 TX/PRO/DX INJ NEW DRUG ADDON: CPT | Mod: INF

## 2025-05-28 PROCEDURE — 2500000004 HC RX 250 GENERAL PHARMACY W/ HCPCS (ALT 636 FOR OP/ED): Performed by: INTERNAL MEDICINE

## 2025-05-28 PROCEDURE — 83036 HEMOGLOBIN GLYCOSYLATED A1C: CPT

## 2025-05-28 PROCEDURE — 84550 ASSAY OF BLOOD/URIC ACID: CPT | Performed by: INTERNAL MEDICINE

## 2025-05-28 PROCEDURE — 83615 LACTATE (LD) (LDH) ENZYME: CPT | Performed by: INTERNAL MEDICINE

## 2025-05-28 PROCEDURE — 2500000004 HC RX 250 GENERAL PHARMACY W/ HCPCS (ALT 636 FOR OP/ED): Mod: JZ | Performed by: INTERNAL MEDICINE

## 2025-05-28 PROCEDURE — 96415 CHEMO IV INFUSION ADDL HR: CPT

## 2025-05-28 RX ORDER — PROCHLORPERAZINE EDISYLATE 5 MG/ML
10 INJECTION INTRAMUSCULAR; INTRAVENOUS EVERY 6 HOURS PRN
Status: DISCONTINUED | OUTPATIENT
Start: 2025-05-28 | End: 2025-05-28 | Stop reason: HOSPADM

## 2025-05-28 RX ORDER — ALBUTEROL SULFATE 0.83 MG/ML
3 SOLUTION RESPIRATORY (INHALATION) AS NEEDED
Status: DISCONTINUED | OUTPATIENT
Start: 2025-05-28 | End: 2025-05-28 | Stop reason: HOSPADM

## 2025-05-28 RX ORDER — FAMOTIDINE 10 MG/ML
20 INJECTION, SOLUTION INTRAVENOUS ONCE AS NEEDED
Status: DISCONTINUED | OUTPATIENT
Start: 2025-05-28 | End: 2025-05-28 | Stop reason: HOSPADM

## 2025-05-28 RX ORDER — FAMOTIDINE 10 MG/ML
20 INJECTION, SOLUTION INTRAVENOUS ONCE
Status: COMPLETED | OUTPATIENT
Start: 2025-05-28 | End: 2025-05-28

## 2025-05-28 RX ORDER — PROCHLORPERAZINE MALEATE 10 MG
10 TABLET ORAL EVERY 6 HOURS PRN
Status: DISCONTINUED | OUTPATIENT
Start: 2025-05-28 | End: 2025-05-28 | Stop reason: HOSPADM

## 2025-05-28 RX ORDER — EPINEPHRINE 0.3 MG/.3ML
0.3 INJECTION SUBCUTANEOUS EVERY 5 MIN PRN
Status: DISCONTINUED | OUTPATIENT
Start: 2025-05-28 | End: 2025-05-28 | Stop reason: HOSPADM

## 2025-05-28 RX ORDER — ACETAMINOPHEN 325 MG/1
650 TABLET ORAL ONCE
Status: COMPLETED | OUTPATIENT
Start: 2025-05-28 | End: 2025-05-28

## 2025-05-28 RX ORDER — DIPHENHYDRAMINE HYDROCHLORIDE 50 MG/ML
50 INJECTION, SOLUTION INTRAMUSCULAR; INTRAVENOUS AS NEEDED
Status: DISCONTINUED | OUTPATIENT
Start: 2025-05-28 | End: 2025-05-28 | Stop reason: HOSPADM

## 2025-05-28 RX ADMIN — SODIUM CHLORIDE 600 MG: 0.9 INJECTION, SOLUTION INTRAVENOUS at 10:32

## 2025-05-28 RX ADMIN — ACETAMINOPHEN 650 MG: 325 TABLET ORAL at 10:01

## 2025-05-28 RX ADMIN — DIPHENHYDRAMINE HYDROCHLORIDE 50 MG: 50 INJECTION, SOLUTION INTRAMUSCULAR; INTRAVENOUS at 10:01

## 2025-05-28 RX ADMIN — DEXAMETHASONE 20 MG: 6 TABLET ORAL at 10:01

## 2025-05-28 RX ADMIN — FAMOTIDINE 20 MG: 10 INJECTION INTRAVENOUS at 10:01

## 2025-05-28 NOTE — PROGRESS NOTES
Patient seen in infusion for Accelerated Rituxan, tolerated without complications. Labs and schedule reviewed with patient. IV removed. Discharged ambulatory in stable condition.

## 2025-06-23 ENCOUNTER — APPOINTMENT (OUTPATIENT)
Dept: CARDIOLOGY | Facility: CLINIC | Age: 80
End: 2025-06-23
Payer: MEDICARE

## 2025-06-25 ENCOUNTER — APPOINTMENT (OUTPATIENT)
Dept: CARDIOLOGY | Facility: CLINIC | Age: 80
End: 2025-06-25
Payer: MEDICARE

## 2025-07-13 NOTE — PROGRESS NOTES
Chief Complaint:   No chief complaint on file.     History Of Present Illness:    Adilene Polo is a 79 y.o. female presenting with SVT, hereditary ATTR cardiac amyloidosis.    This 79-year-old woman has hereditary ATTR cardiac amyloidosis, and a history of SVT, and chronic venous insufficiency.  She comes to the office in routine follow-up.  She is on tafamidis for her cardiac amyloidosis.  Her prior stress echo in January of 2021 was negative for ischemia at 10.1 METS. 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 She developed elevated LFTs so she saw GI who advised her to hold Atorvastatin. Upon rechallenge, her LFTs greta again, and the medication was discontinued.     She experiences rare episodes of brief palpitations which are not exertional.  The patient denies chest discomfort, dyspnea,  orthopnea, PND, syncope, and near syncope    Her daughter also tested positive for ATTR amyloidosis.  One son refused testing, and the other two sons are waiting to be tested.    ++++++++++++++++++++++++++++++++             Last Recorded Vitals:  There were no vitals filed for this visit.      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    Past Surgical History:  She has a past surgical history  that includes Cholecystectomy (10/28/2014); Gastric bypass (10/28/2014); Incisional breast biopsy (07/20/2018); Thyroidectomy, partial; and Whipple procedure w/ laparoscopy.      Social History:  She reports that she quit smoking about 45 years ago. Her smoking use included cigarettes. She has never used smokeless tobacco. She reports that she does not drink alcohol and does not use drugs.    Family History:  Family History   Problem Relation Name Age of Onset    Heart disease Mother          Coronary    Heart disease Father          Coronary    Heart attack Father      Prostate cancer Father      Breast cancer Sister      Kidney disease Brother      Lung cancer Brother      Prostate cancer Brother      Pulmonary embolism Brother          Allergies:  Cephalosporins, Penicillins, Ace inhibitors, Atorvastatin, and Iodinated contrast media    Outpatient Medications:  Current Outpatient Medications   Medication Instructions    b complex 0.4 mg tablet 1 tablet, Daily    Blood glucose monitoring meter Use twice daily to check sugars    blood sugar diagnostic (Accu-Chek SmartView Test Strip) strip USE 1 STRIP TWICE DAILY.    blood-glucose meter misc Check sugars daily    CALCIUM ORAL 1 tablet, Daily    cyanocobalamin (VITAMIN B-12) 1,000 mcg, intramuscular, Every 30 days    diphenhydrAMINE (BENADryl) 50 mg capsule Take one capsule (50 mg) by mouth one hour before contrast medium administration    docusate sodium (STOOL SOFTENER ORAL) 1 capsule, Daily    gabapentin (NEURONTIN) 600 mg, oral, Nightly    levothyroxine (SYNTHROID, LEVOXYL) 100 mcg, oral, Daily before breakfast    magnesium oxide (Mag-Ox) 250 mg magnesium tablet 1 tablet, 2 times daily    metoprolol succinate XL (TOPROL-XL) 25 mg, oral, Daily    NON FORMULARY Vitamin D 50 MCG (2000 UT) Oral Tablet; Take 1 tablet daily    prochlorperazine (COMPAZINE) 10 mg, oral, Every 6 hours PRN    Prolia 60 mg, Every 6 months    sulfaSALAzine (Azulfidine) 500 mg tablet 2  tablets, 2 times daily    tafamidis (VYNDAMAX) 61 mg, oral, Daily    vutrisiran sodium (AMVUTTRA SUBQ) Inject under the skin.    WHEAT DEXTRIN ORAL Benefiber Oral Powder; Use as directed    zinc gluconate 50 mg tablet 1 tablet, Daily       Physical Exam:  GENERAL:  pleasant 78 year-old  HEENT: No xanthelasma  NECK: Supple, no palpable adenopathy or thyromegaly  CHEST: Clear to auscultation, respiratory effort unlabored  CARDIAC: RRR, normal S1 and S2, no audible murmur, rub, gallop, carotids are brisk, PMI is not displaced  ABD: Active bowel sounds, nontender, no organomegaly, no evidence of ascites  EXT: No clubbing, cyanosis, edema, or tenderness  NEURO: Awake, alert, appropriate, speech is fluent       Last Labs:  CBC -  Lab Results   Component Value Date    WBC 2.0 (L) 05/28/2025    HGB 10.6 (L) 05/28/2025    HCT 35.3 (L) 05/28/2025     (H) 05/28/2025     05/28/2025       CMP -  Lab Results   Component Value Date    CALCIUM 9.0 05/28/2025    PHOS 4.6 11/18/2024    PROT 7.3 05/28/2025    ALBUMIN 4.2 05/28/2025    AST 27 05/28/2025    ALT 17 05/28/2025    ALKPHOS 179 (H) 05/28/2025    BILITOT 0.7 05/28/2025       LIPID PANEL -   Lab Results   Component Value Date    CHOL 205 (H) 11/18/2024    TRIG 84 11/18/2024    HDL 68.2 11/18/2024    CHHDL 3.0 11/18/2024    LDLF 122 (H) 08/01/2023    VLDL 17 11/18/2024    NHDL 137 11/18/2024       RENAL FUNCTION PANEL -   Lab Results   Component Value Date    GLUCOSE 102 (H) 05/28/2025     05/28/2025    K 3.9 05/28/2025     05/28/2025    CO2 27 05/28/2025    ANIONGAP 13 05/28/2025    BUN 27 (H) 05/28/2025    CREATININE 0.80 05/28/2025    CALCIUM 9.0 05/28/2025    PHOS 4.6 11/18/2024    ALBUMIN 4.2 05/28/2025        Lab Results   Component Value Date     (H) 09/14/2023    HGBA1C 3.9 05/28/2025    HGBA1C 5.1 03/27/2023         Lab review: I have Chemistry CMP:   Lab Results   Component Value Date    ALBUMIN 4.2 05/28/2025    CALCIUM 9.0 05/28/2025     CO2 27 05/28/2025    CREATININE 0.80 05/28/2025    GLUCOSE 102 (H) 05/28/2025    BILITOT 0.7 05/28/2025    PROT 7.3 05/28/2025    ALT 17 05/28/2025    AST 27 05/28/2025    ALKPHOS 179 (H) 05/28/2025       {Lab/Diag/Rad Review:12084}      Assessment/Plan   {Assess/Plan SmartLinks:84902}        Mychal Solis MD   Holter Or Event Cardiac Monitor; Future    Follow Up In Cardiology; Future            Mychal Solis MD

## 2025-07-15 ENCOUNTER — APPOINTMENT (OUTPATIENT)
Dept: CARDIOLOGY | Facility: CLINIC | Age: 80
End: 2025-07-15
Payer: MEDICARE

## 2025-07-15 VITALS
SYSTOLIC BLOOD PRESSURE: 112 MMHG | WEIGHT: 120 LBS | BODY MASS INDEX: 17.77 KG/M2 | HEIGHT: 69 IN | HEART RATE: 65 BPM | DIASTOLIC BLOOD PRESSURE: 60 MMHG

## 2025-07-15 DIAGNOSIS — I43 CARDIAC AMYLOIDOSIS: Primary | ICD-10-CM

## 2025-07-15 DIAGNOSIS — R00.2 PALPITATIONS: ICD-10-CM

## 2025-07-15 DIAGNOSIS — R07.89 CHEST DISCOMFORT: ICD-10-CM

## 2025-07-15 DIAGNOSIS — I47.10 PAROXYSMAL SUPRAVENTRICULAR TACHYCARDIA: ICD-10-CM

## 2025-07-15 DIAGNOSIS — E85.4 CARDIAC AMYLOIDOSIS: Primary | ICD-10-CM

## 2025-07-15 PROCEDURE — 93000 ELECTROCARDIOGRAM COMPLETE: CPT | Performed by: INTERNAL MEDICINE

## 2025-07-15 PROCEDURE — 1036F TOBACCO NON-USER: CPT | Performed by: INTERNAL MEDICINE

## 2025-07-15 PROCEDURE — 1160F RVW MEDS BY RX/DR IN RCRD: CPT | Performed by: INTERNAL MEDICINE

## 2025-07-15 PROCEDURE — 1159F MED LIST DOCD IN RCRD: CPT | Performed by: INTERNAL MEDICINE

## 2025-07-15 PROCEDURE — 99215 OFFICE O/P EST HI 40 MIN: CPT | Performed by: INTERNAL MEDICINE

## 2025-07-15 PROCEDURE — 3074F SYST BP LT 130 MM HG: CPT | Performed by: INTERNAL MEDICINE

## 2025-07-15 PROCEDURE — 3078F DIAST BP <80 MM HG: CPT | Performed by: INTERNAL MEDICINE

## 2025-07-15 PROCEDURE — G2211 COMPLEX E/M VISIT ADD ON: HCPCS | Performed by: INTERNAL MEDICINE

## 2025-07-15 NOTE — PATIENT INSTRUCTIONS

## 2025-07-21 ENCOUNTER — HOSPITAL ENCOUNTER (OUTPATIENT)
Dept: CARDIOLOGY | Facility: HOSPITAL | Age: 80
Discharge: HOME | End: 2025-07-21
Payer: MEDICARE

## 2025-07-21 ENCOUNTER — HOSPITAL ENCOUNTER (OUTPATIENT)
Dept: RADIOLOGY | Facility: HOSPITAL | Age: 80
Discharge: HOME | End: 2025-07-21
Payer: MEDICARE

## 2025-07-21 DIAGNOSIS — R07.89 CHEST DISCOMFORT: ICD-10-CM

## 2025-07-21 PROCEDURE — 78452 HT MUSCLE IMAGE SPECT MULT: CPT

## 2025-07-21 PROCEDURE — 3430000001 HC RX 343 DIAGNOSTIC RADIOPHARMACEUTICALS: Performed by: INTERNAL MEDICINE

## 2025-07-21 PROCEDURE — 93017 CV STRESS TEST TRACING ONLY: CPT

## 2025-07-21 PROCEDURE — 93018 CV STRESS TEST I&R ONLY: CPT | Performed by: INTERNAL MEDICINE

## 2025-07-21 PROCEDURE — 78452 HT MUSCLE IMAGE SPECT MULT: CPT | Performed by: INTERNAL MEDICINE

## 2025-07-21 PROCEDURE — 93016 CV STRESS TEST SUPVJ ONLY: CPT | Performed by: INTERNAL MEDICINE

## 2025-07-21 PROCEDURE — A9502 TC99M TETROFOSMIN: HCPCS | Performed by: INTERNAL MEDICINE

## 2025-07-21 RX ADMIN — TETROFOSMIN 8.8 MILLICURIE: 0.23 INJECTION, POWDER, LYOPHILIZED, FOR SOLUTION INTRAVENOUS at 10:45

## 2025-07-21 RX ADMIN — TETROFOSMIN 30.2 MILLICURIE: 0.23 INJECTION, POWDER, LYOPHILIZED, FOR SOLUTION INTRAVENOUS at 12:47

## 2025-07-23 ENCOUNTER — HOSPITAL ENCOUNTER (OUTPATIENT)
Dept: CARDIOLOGY | Facility: HOSPITAL | Age: 80
Discharge: HOME | End: 2025-07-23
Payer: MEDICARE

## 2025-07-23 DIAGNOSIS — I43 CARDIAC AMYLOIDOSIS: ICD-10-CM

## 2025-07-23 DIAGNOSIS — R00.2 PALPITATIONS: ICD-10-CM

## 2025-07-23 DIAGNOSIS — E85.4 CARDIAC AMYLOIDOSIS: ICD-10-CM

## 2025-07-23 PROCEDURE — 93242 EXT ECG>48HR<7D RECORDING: CPT

## 2025-07-23 PROCEDURE — 76376 3D RENDER W/INTRP POSTPROCES: CPT

## 2025-07-23 PROCEDURE — 93306 TTE W/DOPPLER COMPLETE: CPT | Performed by: INTERNAL MEDICINE

## 2025-07-23 PROCEDURE — 76376 3D RENDER W/INTRP POSTPROCES: CPT | Performed by: INTERNAL MEDICINE

## 2025-07-23 PROCEDURE — 93356 MYOCRD STRAIN IMG SPCKL TRCK: CPT | Performed by: INTERNAL MEDICINE

## 2025-07-24 LAB
AORTIC VALVE MEAN GRADIENT: 5 MMHG
AORTIC VALVE PEAK VELOCITY: 1.54 M/S
AV PEAK GRADIENT: 9 MMHG
AVA (PEAK VEL): 2.04 CM2
AVA (VTI): 2.21 CM2
EJECTION FRACTION APICAL 4 CHAMBER: 60.2
EJECTION FRACTION: 60 %
LEFT ATRIUM VOLUME AREA LENGTH INDEX BSA: 57.2 ML/M2
LEFT VENTRICLE INTERNAL DIMENSION DIASTOLE: 4.2 CM (ref 3.5–6)
LEFT VENTRICULAR OUTFLOW TRACT DIAMETER: 2.1 CM
LV EJECTION FRACTION BIPLANE: 60 %
MITRAL VALVE E/A RATIO: 1.5
RIGHT VENTRICLE FREE WALL PEAK S': 8.92 CM/S
RIGHT VENTRICLE PEAK SYSTOLIC PRESSURE: 21 MMHG
TRICUSPID ANNULAR PLANE SYSTOLIC EXCURSION: 2 CM

## 2025-08-04 ENCOUNTER — APPOINTMENT (OUTPATIENT)
Dept: NEUROLOGY | Facility: CLINIC | Age: 80
End: 2025-08-04
Payer: MEDICARE

## 2025-08-20 ENCOUNTER — APPOINTMENT (OUTPATIENT)
Dept: CARDIOLOGY | Facility: CLINIC | Age: 80
End: 2025-08-20
Payer: MEDICARE

## 2025-08-20 VITALS
BODY MASS INDEX: 16.88 KG/M2 | RESPIRATION RATE: 18 BRPM | WEIGHT: 114 LBS | OXYGEN SATURATION: 99 % | HEIGHT: 69 IN | SYSTOLIC BLOOD PRESSURE: 124 MMHG | HEART RATE: 56 BPM | DIASTOLIC BLOOD PRESSURE: 52 MMHG

## 2025-08-20 DIAGNOSIS — E85.4 CARDIAC AMYLOIDOSIS: ICD-10-CM

## 2025-08-20 DIAGNOSIS — R00.2 PALPITATIONS: ICD-10-CM

## 2025-08-20 DIAGNOSIS — I47.10 PAROXYSMAL SUPRAVENTRICULAR TACHYCARDIA: ICD-10-CM

## 2025-08-20 DIAGNOSIS — R07.89 CHEST DISCOMFORT: ICD-10-CM

## 2025-08-20 DIAGNOSIS — I43 CARDIAC AMYLOIDOSIS: ICD-10-CM

## 2025-08-20 PROCEDURE — 1159F MED LIST DOCD IN RCRD: CPT | Performed by: INTERNAL MEDICINE

## 2025-08-20 PROCEDURE — 1160F RVW MEDS BY RX/DR IN RCRD: CPT | Performed by: INTERNAL MEDICINE

## 2025-08-20 PROCEDURE — 3074F SYST BP LT 130 MM HG: CPT | Performed by: INTERNAL MEDICINE

## 2025-08-20 PROCEDURE — 3078F DIAST BP <80 MM HG: CPT | Performed by: INTERNAL MEDICINE

## 2025-08-20 PROCEDURE — 99215 OFFICE O/P EST HI 40 MIN: CPT | Performed by: INTERNAL MEDICINE

## 2025-08-20 PROCEDURE — G2211 COMPLEX E/M VISIT ADD ON: HCPCS | Performed by: INTERNAL MEDICINE

## 2025-08-20 RX ORDER — LEVOTHYROXINE SODIUM 100 UG/1
100 TABLET ORAL DAILY
COMMUNITY

## 2025-09-19 ENCOUNTER — APPOINTMENT (OUTPATIENT)
Dept: OPHTHALMOLOGY | Facility: CLINIC | Age: 80
End: 2025-09-19
Payer: MEDICARE

## 2025-11-14 ENCOUNTER — APPOINTMENT (OUTPATIENT)
Dept: PRIMARY CARE | Facility: CLINIC | Age: 80
End: 2025-11-14
Payer: MEDICARE

## 2025-12-29 ENCOUNTER — APPOINTMENT (OUTPATIENT)
Dept: NEUROLOGY | Facility: CLINIC | Age: 80
End: 2025-12-29
Payer: MEDICARE

## 2026-08-24 ENCOUNTER — APPOINTMENT (OUTPATIENT)
Dept: CARDIOLOGY | Facility: CLINIC | Age: 81
End: 2026-08-24
Payer: MEDICARE